# Patient Record
Sex: MALE | Race: WHITE | NOT HISPANIC OR LATINO
[De-identification: names, ages, dates, MRNs, and addresses within clinical notes are randomized per-mention and may not be internally consistent; named-entity substitution may affect disease eponyms.]

---

## 2017-01-03 ENCOUNTER — RX RENEWAL (OUTPATIENT)
Age: 82
End: 2017-01-03

## 2017-01-05 ENCOUNTER — RX RENEWAL (OUTPATIENT)
Age: 82
End: 2017-01-05

## 2017-01-23 ENCOUNTER — APPOINTMENT (OUTPATIENT)
Dept: CARDIOLOGY | Facility: CLINIC | Age: 82
End: 2017-01-23

## 2017-01-23 ENCOUNTER — NON-APPOINTMENT (OUTPATIENT)
Age: 82
End: 2017-01-23

## 2017-01-23 ENCOUNTER — APPOINTMENT (OUTPATIENT)
Dept: PULMONOLOGY | Facility: CLINIC | Age: 82
End: 2017-01-23

## 2017-01-23 VITALS
HEART RATE: 64 BPM | RESPIRATION RATE: 12 BRPM | SYSTOLIC BLOOD PRESSURE: 157 MMHG | OXYGEN SATURATION: 93 % | HEIGHT: 65 IN | WEIGHT: 160 LBS | DIASTOLIC BLOOD PRESSURE: 70 MMHG | BODY MASS INDEX: 26.66 KG/M2

## 2017-01-23 VITALS — SYSTOLIC BLOOD PRESSURE: 130 MMHG | DIASTOLIC BLOOD PRESSURE: 78 MMHG

## 2017-03-07 ENCOUNTER — APPOINTMENT (OUTPATIENT)
Dept: PULMONOLOGY | Facility: CLINIC | Age: 82
End: 2017-03-07

## 2017-03-07 VITALS
DIASTOLIC BLOOD PRESSURE: 80 MMHG | RESPIRATION RATE: 14 BRPM | HEART RATE: 60 BPM | SYSTOLIC BLOOD PRESSURE: 120 MMHG | OXYGEN SATURATION: 96 %

## 2017-04-03 ENCOUNTER — APPOINTMENT (OUTPATIENT)
Dept: PULMONOLOGY | Facility: CLINIC | Age: 82
End: 2017-04-03

## 2017-04-03 VITALS
HEART RATE: 56 BPM | SYSTOLIC BLOOD PRESSURE: 139 MMHG | HEIGHT: 65 IN | TEMPERATURE: 98.3 F | OXYGEN SATURATION: 96 % | WEIGHT: 173 LBS | BODY MASS INDEX: 28.82 KG/M2 | DIASTOLIC BLOOD PRESSURE: 67 MMHG | RESPIRATION RATE: 12 BRPM

## 2017-05-10 ENCOUNTER — RX RENEWAL (OUTPATIENT)
Age: 82
End: 2017-05-10

## 2017-05-30 ENCOUNTER — RX RENEWAL (OUTPATIENT)
Age: 82
End: 2017-05-30

## 2017-06-20 ENCOUNTER — RX RENEWAL (OUTPATIENT)
Age: 82
End: 2017-06-20

## 2017-06-27 ENCOUNTER — RX RENEWAL (OUTPATIENT)
Age: 82
End: 2017-06-27

## 2017-06-27 RX ORDER — IBANDRONATE SODIUM 150 MG/1
150 TABLET ORAL
Qty: 12 | Refills: 0 | Status: ACTIVE | COMMUNITY
Start: 2017-06-27 | End: 1900-01-01

## 2017-10-30 ENCOUNTER — APPOINTMENT (OUTPATIENT)
Dept: PULMONOLOGY | Facility: CLINIC | Age: 82
End: 2017-10-30
Payer: MEDICARE

## 2017-10-30 VITALS
SYSTOLIC BLOOD PRESSURE: 145 MMHG | HEIGHT: 65 IN | WEIGHT: 169 LBS | BODY MASS INDEX: 28.16 KG/M2 | TEMPERATURE: 98 F | DIASTOLIC BLOOD PRESSURE: 70 MMHG | RESPIRATION RATE: 12 BRPM | OXYGEN SATURATION: 95 % | HEART RATE: 58 BPM

## 2017-10-30 DIAGNOSIS — N40.0 BENIGN PROSTATIC HYPERPLASIA WITHOUT LOWER URINARY TRACT SYMPMS: ICD-10-CM

## 2017-10-30 PROCEDURE — 99397 PER PM REEVAL EST PAT 65+ YR: CPT

## 2017-10-30 PROCEDURE — G0439: CPT

## 2017-11-06 ENCOUNTER — MEDICATION RENEWAL (OUTPATIENT)
Age: 82
End: 2017-11-06

## 2017-11-15 ENCOUNTER — APPOINTMENT (OUTPATIENT)
Dept: CARDIOLOGY | Facility: CLINIC | Age: 82
End: 2017-11-15

## 2017-11-30 ENCOUNTER — NON-APPOINTMENT (OUTPATIENT)
Age: 82
End: 2017-11-30

## 2017-11-30 ENCOUNTER — APPOINTMENT (OUTPATIENT)
Dept: CARDIOLOGY | Facility: CLINIC | Age: 82
End: 2017-11-30
Payer: MEDICARE

## 2017-11-30 VITALS
DIASTOLIC BLOOD PRESSURE: 61 MMHG | BODY MASS INDEX: 27.99 KG/M2 | TEMPERATURE: 97.5 F | OXYGEN SATURATION: 94 % | HEART RATE: 62 BPM | WEIGHT: 168 LBS | HEIGHT: 65 IN | SYSTOLIC BLOOD PRESSURE: 145 MMHG | RESPIRATION RATE: 12 BRPM

## 2017-11-30 PROCEDURE — 99215 OFFICE O/P EST HI 40 MIN: CPT | Mod: 25

## 2017-11-30 PROCEDURE — 93000 ELECTROCARDIOGRAM COMPLETE: CPT

## 2017-12-11 ENCOUNTER — RX RENEWAL (OUTPATIENT)
Age: 82
End: 2017-12-11

## 2017-12-13 ENCOUNTER — RX RENEWAL (OUTPATIENT)
Age: 82
End: 2017-12-13

## 2018-01-03 ENCOUNTER — APPOINTMENT (OUTPATIENT)
Dept: CARDIOLOGY | Facility: CLINIC | Age: 83
End: 2018-01-03

## 2018-02-05 ENCOUNTER — APPOINTMENT (OUTPATIENT)
Dept: PULMONOLOGY | Facility: CLINIC | Age: 83
End: 2018-02-05

## 2018-02-13 ENCOUNTER — RX RENEWAL (OUTPATIENT)
Age: 83
End: 2018-02-13

## 2018-03-14 ENCOUNTER — NON-APPOINTMENT (OUTPATIENT)
Age: 83
End: 2018-03-14

## 2018-03-14 ENCOUNTER — APPOINTMENT (OUTPATIENT)
Dept: CARDIOLOGY | Facility: CLINIC | Age: 83
End: 2018-03-14
Payer: MEDICARE

## 2018-03-14 VITALS
BODY MASS INDEX: 28.99 KG/M2 | TEMPERATURE: 97.9 F | SYSTOLIC BLOOD PRESSURE: 154 MMHG | HEIGHT: 65 IN | HEART RATE: 65 BPM | RESPIRATION RATE: 12 BRPM | OXYGEN SATURATION: 95 % | WEIGHT: 174 LBS | DIASTOLIC BLOOD PRESSURE: 66 MMHG

## 2018-03-14 PROCEDURE — 93000 ELECTROCARDIOGRAM COMPLETE: CPT

## 2018-03-14 PROCEDURE — 99215 OFFICE O/P EST HI 40 MIN: CPT | Mod: 25

## 2018-04-16 ENCOUNTER — APPOINTMENT (OUTPATIENT)
Dept: PULMONOLOGY | Facility: CLINIC | Age: 83
End: 2018-04-16
Payer: MEDICARE

## 2018-04-16 ENCOUNTER — NON-APPOINTMENT (OUTPATIENT)
Age: 83
End: 2018-04-16

## 2018-04-16 VITALS
BODY MASS INDEX: 28.49 KG/M2 | DIASTOLIC BLOOD PRESSURE: 68 MMHG | HEIGHT: 65 IN | RESPIRATION RATE: 12 BRPM | OXYGEN SATURATION: 90 % | WEIGHT: 171 LBS | HEART RATE: 73 BPM | SYSTOLIC BLOOD PRESSURE: 138 MMHG

## 2018-04-16 PROCEDURE — 99214 OFFICE O/P EST MOD 30 MIN: CPT | Mod: 25

## 2018-04-16 PROCEDURE — 94060 EVALUATION OF WHEEZING: CPT

## 2018-04-30 ENCOUNTER — RX RENEWAL (OUTPATIENT)
Age: 83
End: 2018-04-30

## 2018-06-04 ENCOUNTER — RX RENEWAL (OUTPATIENT)
Age: 83
End: 2018-06-04

## 2018-06-18 ENCOUNTER — RX RENEWAL (OUTPATIENT)
Age: 83
End: 2018-06-18

## 2018-06-20 ENCOUNTER — APPOINTMENT (OUTPATIENT)
Dept: CARDIOLOGY | Facility: CLINIC | Age: 83
End: 2018-06-20
Payer: MEDICARE

## 2018-06-20 ENCOUNTER — NON-APPOINTMENT (OUTPATIENT)
Age: 83
End: 2018-06-20

## 2018-06-20 VITALS
WEIGHT: 175 LBS | SYSTOLIC BLOOD PRESSURE: 150 MMHG | BODY MASS INDEX: 29.16 KG/M2 | DIASTOLIC BLOOD PRESSURE: 54 MMHG | HEIGHT: 65 IN | HEART RATE: 56 BPM | TEMPERATURE: 97.9 F | RESPIRATION RATE: 12 BRPM | OXYGEN SATURATION: 96 %

## 2018-06-20 PROCEDURE — 99214 OFFICE O/P EST MOD 30 MIN: CPT

## 2018-08-27 ENCOUNTER — RX RENEWAL (OUTPATIENT)
Age: 83
End: 2018-08-27

## 2018-09-12 ENCOUNTER — APPOINTMENT (OUTPATIENT)
Dept: CARDIOLOGY | Facility: CLINIC | Age: 83
End: 2018-09-12
Payer: MEDICARE

## 2018-09-12 ENCOUNTER — NON-APPOINTMENT (OUTPATIENT)
Age: 83
End: 2018-09-12

## 2018-09-12 VITALS
HEIGHT: 65 IN | TEMPERATURE: 97.9 F | HEART RATE: 42 BPM | WEIGHT: 180 LBS | RESPIRATION RATE: 12 BRPM | DIASTOLIC BLOOD PRESSURE: 56 MMHG | OXYGEN SATURATION: 97 % | BODY MASS INDEX: 29.99 KG/M2 | SYSTOLIC BLOOD PRESSURE: 156 MMHG

## 2018-09-12 PROCEDURE — 99214 OFFICE O/P EST MOD 30 MIN: CPT | Mod: 25

## 2018-09-12 PROCEDURE — 93306 TTE W/DOPPLER COMPLETE: CPT

## 2018-09-12 RX ORDER — CIPROFLOXACIN 3 MG/ML
0.3 SOLUTION OPHTHALMIC EVERY 4 HOURS
Qty: 5 | Refills: 0 | Status: DISCONTINUED | COMMUNITY
Start: 2017-01-23 | End: 2018-09-12

## 2018-10-23 ENCOUNTER — RX RENEWAL (OUTPATIENT)
Age: 83
End: 2018-10-23

## 2018-11-15 ENCOUNTER — RX RENEWAL (OUTPATIENT)
Age: 83
End: 2018-11-15

## 2018-11-26 ENCOUNTER — RX RENEWAL (OUTPATIENT)
Age: 83
End: 2018-11-26

## 2018-12-18 ENCOUNTER — RX RENEWAL (OUTPATIENT)
Age: 83
End: 2018-12-18

## 2018-12-19 ENCOUNTER — NON-APPOINTMENT (OUTPATIENT)
Age: 83
End: 2018-12-19

## 2018-12-19 ENCOUNTER — APPOINTMENT (OUTPATIENT)
Dept: CARDIOLOGY | Facility: CLINIC | Age: 83
End: 2018-12-19
Payer: MEDICARE

## 2018-12-19 VITALS
RESPIRATION RATE: 12 BRPM | DIASTOLIC BLOOD PRESSURE: 68 MMHG | TEMPERATURE: 98.2 F | OXYGEN SATURATION: 96 % | SYSTOLIC BLOOD PRESSURE: 124 MMHG | WEIGHT: 179 LBS | HEIGHT: 65 IN | BODY MASS INDEX: 29.82 KG/M2 | HEART RATE: 62 BPM

## 2018-12-19 PROCEDURE — 99214 OFFICE O/P EST MOD 30 MIN: CPT | Mod: 25

## 2018-12-19 NOTE — HISTORY OF PRESENT ILLNESS
[FreeTextEntry1] : Kory is complaining of fatigue. His blood pressure after dialysis is 140s. Denies any chest pain, palpitations, shortness of breath, edema.

## 2018-12-19 NOTE — PHYSICAL EXAM
[General Appearance - Well Developed] : well developed [Normal Appearance] : normal appearance [Well Groomed] : well groomed [General Appearance - Well Nourished] : well nourished [No Deformities] : no deformities [General Appearance - In No Acute Distress] : no acute distress [Normal Conjunctiva] : the conjunctiva exhibited no abnormalities [Eyelids - No Xanthelasma] : the eyelids demonstrated no xanthelasmas [Normal Oral Mucosa] : normal oral mucosa [No Oral Pallor] : no oral pallor [No Oral Cyanosis] : no oral cyanosis [Normal Jugular Venous A Waves Present] : normal jugular venous A waves present [Normal Jugular Venous V Waves Present] : normal jugular venous V waves present [No Jugular Venous Covarrubias A Waves] : no jugular venous covarrubias A waves [Respiration, Rhythm And Depth] : normal respiratory rhythm and effort [Exaggerated Use Of Accessory Muscles For Inspiration] : no accessory muscle use [Heart Sounds] : normal S1 and S2 [Regularly Irregular] : the rhythm was regularly irregular [Systolic grade ___/6] : A grade [unfilled]/6 systolic murmur was heard. [Abdomen Soft] : soft [Abdomen Tenderness] : non-tender [Abdomen Mass (___ Cm)] : no abdominal mass palpated [Abnormal Walk] : normal gait [Gait - Sufficient For Exercise Testing] : the gait was sufficient for exercise testing [Nail Clubbing] : no clubbing of the fingernails [Cyanosis, Localized] : no localized cyanosis [Petechial Hemorrhages (___cm)] : no petechial hemorrhages [Skin Color & Pigmentation] : normal skin color and pigmentation [] : no rash [No Venous Stasis] : no venous stasis [Skin Lesions] : no skin lesions [No Skin Ulcers] : no skin ulcer [No Xanthoma] : no  xanthoma was observed [Oriented To Time, Place, And Person] : oriented to person, place, and time [Affect] : the affect was normal [Mood] : the mood was normal [No Anxiety] : not feeling anxious [FreeTextEntry1] : edentulous

## 2018-12-19 NOTE — DISCUSSION/SUMMARY
[___ Month(s)] : [unfilled] month(s) [FreeTextEntry1] : The patient is an 83-year-old gentleman ex smoker, hypertension, hyperlipidemia, hypothyroidism, PVD,  CAD, chronic systolic heart failure, anemia, ESRD on dialysis who is at baseline. \par #1 CAD- no angina or heart failure, on DAPT, no bleeding.\par #2 Htn- acceptable for his age on amlodipine and toprol, hold amlodipine on dialysis days\par #3 PVC- continue on high dose beta blocker\par #4 Pericardial effusion- improved on echo \par #5 Lipids- on statin\par #6 Hypothyroid- therapeutic\par #7 ESRD- on dialysis, contributing to fatigue

## 2019-02-15 ENCOUNTER — RX RENEWAL (OUTPATIENT)
Age: 84
End: 2019-02-15

## 2019-03-25 ENCOUNTER — APPOINTMENT (OUTPATIENT)
Dept: CARDIOLOGY | Facility: CLINIC | Age: 84
End: 2019-03-25

## 2019-04-05 ENCOUNTER — RX RENEWAL (OUTPATIENT)
Age: 84
End: 2019-04-05

## 2019-04-10 ENCOUNTER — APPOINTMENT (OUTPATIENT)
Dept: CARDIOLOGY | Facility: CLINIC | Age: 84
End: 2019-04-10
Payer: MEDICARE

## 2019-04-10 ENCOUNTER — NON-APPOINTMENT (OUTPATIENT)
Age: 84
End: 2019-04-10

## 2019-04-10 VITALS
HEIGHT: 65 IN | OXYGEN SATURATION: 95 % | BODY MASS INDEX: 28.82 KG/M2 | SYSTOLIC BLOOD PRESSURE: 157 MMHG | RESPIRATION RATE: 12 BRPM | WEIGHT: 173 LBS | HEART RATE: 64 BPM | DIASTOLIC BLOOD PRESSURE: 79 MMHG | TEMPERATURE: 98.1 F

## 2019-04-10 PROCEDURE — 99214 OFFICE O/P EST MOD 30 MIN: CPT | Mod: 25

## 2019-04-10 PROCEDURE — 93000 ELECTROCARDIOGRAM COMPLETE: CPT

## 2019-04-10 NOTE — REVIEW OF SYSTEMS
[Shortness Of Breath] : no shortness of breath [Recent Weight Loss (___ Lbs)] : recent [unfilled] ~Ulb weight loss [Lower Ext Edema] : no extremity edema [Chest Pain] : no chest pain [Dyspnea on exertion] : not dyspnea during exertion [Palpitations] : no palpitations [Negative] : Heme/Lymph

## 2019-04-10 NOTE — DISCUSSION/SUMMARY
[___ Month(s)] : [unfilled] month(s) [FreeTextEntry1] : The patient is an 83-year-old gentleman ex smoker, hypertension, hyperlipidemia, hypothyroidism, PVD,  CAD, chronic systolic heart failure, anemia, ESRD on dialysis who is hypertensive\par #1 CAD- no angina or heart failure, on DAPT, no bleeding.\par #2 Htn- restart amlodipine and continue toprol, hold amlodipine on dialysis days\par #3 PVC- continue on high dose beta blocker\par #4 Pericardial effusion- improved on echo \par #5 Lipids- on atorvastatin 20mg, labs today\par #6 Hypothyroid- therapeutic\par #7 ESRD- on dialysis, contributing to fatigue

## 2019-04-10 NOTE — HISTORY OF PRESENT ILLNESS
[FreeTextEntry1] : Kory ran out of amlodipine and missed his last appointment.  Denies any chest pain, palpitations, shortness of breath, edema.

## 2019-04-10 NOTE — PHYSICAL EXAM
[General Appearance - Well Developed] : well developed [Well Groomed] : well groomed [Normal Appearance] : normal appearance [General Appearance - Well Nourished] : well nourished [No Deformities] : no deformities [General Appearance - In No Acute Distress] : no acute distress [Normal Conjunctiva] : the conjunctiva exhibited no abnormalities [Eyelids - No Xanthelasma] : the eyelids demonstrated no xanthelasmas [No Oral Pallor] : no oral pallor [Normal Oral Mucosa] : normal oral mucosa [No Oral Cyanosis] : no oral cyanosis [FreeTextEntry1] : edentulous [Normal Jugular Venous V Waves Present] : normal jugular venous V waves present [Normal Jugular Venous A Waves Present] : normal jugular venous A waves present [Respiration, Rhythm And Depth] : normal respiratory rhythm and effort [No Jugular Venous Covarrubias A Waves] : no jugular venous covarrubias A waves [Exaggerated Use Of Accessory Muscles For Inspiration] : no accessory muscle use [Heart Sounds] : normal S1 and S2 [Regularly Irregular] : the rhythm was regularly irregular [Systolic grade ___/6] : A grade [unfilled]/6 systolic murmur was heard. [Abdomen Soft] : soft [Abdomen Mass (___ Cm)] : no abdominal mass palpated [Abdomen Tenderness] : non-tender [Abnormal Walk] : normal gait [Gait - Sufficient For Exercise Testing] : the gait was sufficient for exercise testing [Nail Clubbing] : no clubbing of the fingernails [Petechial Hemorrhages (___cm)] : no petechial hemorrhages [Cyanosis, Localized] : no localized cyanosis [] : no ischemic changes [Skin Color & Pigmentation] : normal skin color and pigmentation [No Skin Ulcers] : no skin ulcer [No Venous Stasis] : no venous stasis [Skin Lesions] : no skin lesions [Affect] : the affect was normal [No Xanthoma] : no  xanthoma was observed [Oriented To Time, Place, And Person] : oriented to person, place, and time [Mood] : the mood was normal [No Anxiety] : not feeling anxious

## 2019-04-11 LAB
25(OH)D3 SERPL-MCNC: 30.7 NG/ML
ALBUMIN SERPL ELPH-MCNC: 4.4 G/DL
ALP BLD-CCNC: 99 U/L
ALT SERPL-CCNC: 14 U/L
ANION GAP SERPL CALC-SCNC: 16 MMOL/L
AST SERPL-CCNC: 13 U/L
BASOPHILS # BLD AUTO: 0.06 K/UL
BASOPHILS NFR BLD AUTO: 0.9 %
BILIRUB SERPL-MCNC: 0.2 MG/DL
BUN SERPL-MCNC: 45 MG/DL
CALCIUM SERPL-MCNC: 8.4 MG/DL
CHLORIDE SERPL-SCNC: 96 MMOL/L
CHOLEST SERPL-MCNC: 157 MG/DL
CHOLEST/HDLC SERPL: 2.2 RATIO
CO2 SERPL-SCNC: 32 MMOL/L
CREAT SERPL-MCNC: 6.07 MG/DL
EOSINOPHIL # BLD AUTO: 0.2 K/UL
EOSINOPHIL NFR BLD AUTO: 2.8 %
ESTIMATED AVERAGE GLUCOSE: 131 MG/DL
GLUCOSE SERPL-MCNC: 78 MG/DL
HBA1C MFR BLD HPLC: 6.2 %
HCT VFR BLD CALC: 36.4 %
HDLC SERPL-MCNC: 70 MG/DL
HGB BLD-MCNC: 11.3 G/DL
IMM GRANULOCYTES NFR BLD AUTO: 0.4 %
LDLC SERPL CALC-MCNC: 72 MG/DL
LYMPHOCYTES # BLD AUTO: 1.29 K/UL
LYMPHOCYTES NFR BLD AUTO: 18.3 %
MAN DIFF?: NORMAL
MCHC RBC-ENTMCNC: 31 GM/DL
MCHC RBC-ENTMCNC: 31.1 PG
MCV RBC AUTO: 100.3 FL
MONOCYTES # BLD AUTO: 0.8 K/UL
MONOCYTES NFR BLD AUTO: 11.4 %
NEUTROPHILS # BLD AUTO: 4.65 K/UL
NEUTROPHILS NFR BLD AUTO: 66.2 %
PLATELET # BLD AUTO: 169 K/UL
POTASSIUM SERPL-SCNC: 5.4 MMOL/L
PROT SERPL-MCNC: 6.7 G/DL
RBC # BLD: 3.63 M/UL
RBC # FLD: 14.7 %
SODIUM SERPL-SCNC: 143 MMOL/L
TRIGL SERPL-MCNC: 74 MG/DL
WBC # FLD AUTO: 7.03 K/UL

## 2019-05-07 ENCOUNTER — APPOINTMENT (OUTPATIENT)
Dept: PULMONOLOGY | Facility: CLINIC | Age: 84
End: 2019-05-07
Payer: MEDICARE

## 2019-05-07 VITALS
RESPIRATION RATE: 14 BRPM | HEART RATE: 59 BPM | BODY MASS INDEX: 28.82 KG/M2 | SYSTOLIC BLOOD PRESSURE: 152 MMHG | HEIGHT: 65 IN | OXYGEN SATURATION: 98 % | DIASTOLIC BLOOD PRESSURE: 73 MMHG | TEMPERATURE: 97.9 F | WEIGHT: 173 LBS

## 2019-05-07 DIAGNOSIS — R06.02 SHORTNESS OF BREATH: ICD-10-CM

## 2019-05-07 PROCEDURE — 99214 OFFICE O/P EST MOD 30 MIN: CPT | Mod: 25

## 2019-05-07 PROCEDURE — 94060 EVALUATION OF WHEEZING: CPT

## 2019-05-07 PROCEDURE — ZZZZZ: CPT

## 2019-05-07 NOTE — HISTORY OF PRESENT ILLNESS
[FreeTextEntry1] : Patient is a 83-year-old male with past medical history significant for hypertension, congestive heart failure, chronic obstructive pulmonary disease, atherosclerotic heart disease , End Stage renal disease currently on hemodialysis who presents today for followup . The patient presents complaining of occasional SOB and [place]. Patient presents complaining of cough and sputum production over the last 2 weeks

## 2019-05-07 NOTE — REVIEW OF SYSTEMS
[Cough] : cough [Sputum] : sputum  [Hemoptysis] : no hemoptysis [Pleuritic Pain] : no pleuritic pain [Chest Tightness] : no chest tightness [Dyspnea] : dyspnea [Frequent URIs] : no frequent upper respiratory infections [Wheezing] : wheezing [Negative] : Sleep Disorder

## 2019-05-07 NOTE — ASSESSMENT
[FreeTextEntry1] : In summary the patient is a 83-year-old male with past medical history significant for end-stage renal disease currently on hemodialysis, atherosclerotic heart disease, congestive heart failure, chronic obstructive pulmonary disease, hypertension and hypothyroidism who presents today for followup .The patient's physical exam is significant for scattered rhonchi and expiratory wheezing bilaterally. The patient underwent spirometry which revealed severe obstructive lung disease. The patient is  started on Trelegy. Prescription renewal performed. Patient instructed to continue current medications and followup in 3 months

## 2019-05-07 NOTE — REASON FOR VISIT
[Follow-Up] : a follow-up visit [COPD] : COPD [Shortness of Breath] : shortness of Breath [Cough] : cough [Wheezing] : wheezing

## 2019-05-07 NOTE — PHYSICAL EXAM
[Normal Appearance] : normal appearance [General Appearance - Well Developed] : well developed [General Appearance - Well Nourished] : well nourished [Well Groomed] : well groomed [No Deformities] : no deformities [General Appearance - In No Acute Distress] : no acute distress [Eyelids - No Xanthelasma] : the eyelids demonstrated no xanthelasmas [Normal Conjunctiva] : the conjunctiva exhibited no abnormalities [Normal Oropharynx] : normal oropharynx [II] : II [Jugular Venous Distention Increased] : there was no jugular-venous distention [Neck Appearance] : the appearance of the neck was normal [Heart Rate And Rhythm] : heart rate and rhythm were normal [Heart Sounds] : normal S1 and S2 [Edema] : no peripheral edema present [Exaggerated Use Of Accessory Muscles For Inspiration] : no accessory muscle use [Scattered Wheezes] : scattered wheezing [Prolonged Exp Time] : with prolonged expiratory time [Increased E/I Ratio] : with increased expiratory/inspiratory ratio [Bibasilar Rales/Crackles] : bibasilar rales [Decreased Breath Sounds] : decreased breath sounds [Bowel Sounds] : normal bowel sounds [Abdomen Soft] : soft [Nail Clubbing] : no clubbing of the fingernails [Abnormal Walk] : normal gait [Cyanosis, Localized] : no localized cyanosis [No Focal Deficits] : no focal deficits [] : no rash [Oriented To Time, Place, And Person] : oriented to person, place, and time [Affect] : the affect was normal [Impaired Insight] : insight and judgment were intact [Mood] : the mood was normal

## 2019-07-05 ENCOUNTER — RX RENEWAL (OUTPATIENT)
Age: 84
End: 2019-07-05

## 2019-08-02 ENCOUNTER — RX RENEWAL (OUTPATIENT)
Age: 84
End: 2019-08-02

## 2019-08-07 ENCOUNTER — APPOINTMENT (OUTPATIENT)
Dept: CARDIOLOGY | Facility: CLINIC | Age: 84
End: 2019-08-07

## 2019-08-07 ENCOUNTER — APPOINTMENT (OUTPATIENT)
Dept: INTERNAL MEDICINE | Facility: CLINIC | Age: 84
End: 2019-08-07

## 2019-08-13 ENCOUNTER — APPOINTMENT (OUTPATIENT)
Dept: PULMONOLOGY | Facility: CLINIC | Age: 84
End: 2019-08-13

## 2019-10-28 ENCOUNTER — RX RENEWAL (OUTPATIENT)
Age: 84
End: 2019-10-28

## 2019-11-26 ENCOUNTER — RX RENEWAL (OUTPATIENT)
Age: 84
End: 2019-11-26

## 2020-03-18 ENCOUNTER — RX RENEWAL (OUTPATIENT)
Age: 85
End: 2020-03-18

## 2020-04-15 ENCOUNTER — RX RENEWAL (OUTPATIENT)
Age: 85
End: 2020-04-15

## 2020-04-17 ENCOUNTER — RX RENEWAL (OUTPATIENT)
Age: 85
End: 2020-04-17

## 2020-04-20 ENCOUNTER — APPOINTMENT (OUTPATIENT)
Dept: CARDIOLOGY | Facility: CLINIC | Age: 85
End: 2020-04-20
Payer: MEDICARE

## 2020-04-20 ENCOUNTER — TRANSCRIPTION ENCOUNTER (OUTPATIENT)
Age: 85
End: 2020-04-20

## 2020-04-20 PROCEDURE — 99212 OFFICE O/P EST SF 10 MIN: CPT | Mod: 95

## 2020-04-20 NOTE — HISTORY OF PRESENT ILLNESS
[Home] : at home, [unfilled] , at the time of the visit. [Medical Office: ([facility])___] : at the medical office located in  [Patient] : the patient [Family Member] : family member [Self] : self [FreeTextEntry1] : Kory has not been here in over a year. Dialysis has been going well and holds the amlodipine prior to treatment. He continues to walk without chest pain. Limitations secondary to knee pain. Denies any palpitations, lightheadedness or dizziness.

## 2020-04-20 NOTE — DISCUSSION/SUMMARY
[___ Month(s)] : [unfilled] month(s) [FreeTextEntry1] : The patient is an 84-year-old gentleman ex smoker, hypertension, hyperlipidemia, hypothyroidism, PVD,  CAD, chronic systolic heart failure, anemia, ESRD on dialysis who is asymptomatic.\par #1 CAD- no angina or heart failure, on DAPT, no bleeding.\par #2 Htn-  amlodipine and  toprol, hold amlodipine on dialysis days\par #3 PVC- continue on high dose beta blocker\par #4 Pericardial effusion- echo next visit\par #5 Lipids- on atorvastatin 20mg\par #6 Hypothyroid- therapeutic\par #7 ESRD- on dialysis, contributing to fatigue \par \par Time started: 3:20PM\par Time ended: 3:30PM

## 2020-06-09 ENCOUNTER — RX RENEWAL (OUTPATIENT)
Age: 85
End: 2020-06-09

## 2020-07-15 ENCOUNTER — RX RENEWAL (OUTPATIENT)
Age: 85
End: 2020-07-15

## 2020-09-01 ENCOUNTER — RX RENEWAL (OUTPATIENT)
Age: 85
End: 2020-09-01

## 2020-09-09 ENCOUNTER — APPOINTMENT (OUTPATIENT)
Dept: PULMONOLOGY | Facility: CLINIC | Age: 85
End: 2020-09-09
Payer: MEDICARE

## 2020-09-09 ENCOUNTER — LABORATORY RESULT (OUTPATIENT)
Age: 85
End: 2020-09-09

## 2020-09-09 ENCOUNTER — APPOINTMENT (OUTPATIENT)
Dept: CARDIOLOGY | Facility: CLINIC | Age: 85
End: 2020-09-09
Payer: MEDICARE

## 2020-09-09 VITALS
OXYGEN SATURATION: 97 % | DIASTOLIC BLOOD PRESSURE: 66 MMHG | SYSTOLIC BLOOD PRESSURE: 168 MMHG | RESPIRATION RATE: 14 BRPM | TEMPERATURE: 97.1 F | HEART RATE: 61 BPM | BODY MASS INDEX: 29.95 KG/M2 | WEIGHT: 180 LBS

## 2020-09-09 VITALS — DIASTOLIC BLOOD PRESSURE: 68 MMHG | SYSTOLIC BLOOD PRESSURE: 146 MMHG

## 2020-09-09 VITALS — DIASTOLIC BLOOD PRESSURE: 70 MMHG | SYSTOLIC BLOOD PRESSURE: 140 MMHG

## 2020-09-09 DIAGNOSIS — Z86.79 PERSONAL HISTORY OF OTHER DISEASES OF THE CIRCULATORY SYSTEM: ICD-10-CM

## 2020-09-09 PROCEDURE — 99214 OFFICE O/P EST MOD 30 MIN: CPT

## 2020-09-09 NOTE — REASON FOR VISIT
[Follow-Up] : a follow-up visit [Cough] : cough [COPD] : COPD [Edema] : edema [Shortness of Breath] : shortness of breath [Wheezing] : wheezing

## 2020-09-09 NOTE — HISTORY OF PRESENT ILLNESS
[FreeTextEntry1] : Kory walked in the office today to be seen. Refusing ECG. Denies any chest pain, palpitations or shortness of breath. He is hypotensive after dialysis on Tues, Thurs and Sat.

## 2020-09-09 NOTE — PHYSICAL EXAM
[General Appearance - Well Developed] : well developed [Normal Appearance] : normal appearance [Well Groomed] : well groomed [General Appearance - Well Nourished] : well nourished [General Appearance - In No Acute Distress] : no acute distress [Normal Conjunctiva] : the conjunctiva exhibited no abnormalities [No Deformities] : no deformities [No Oral Pallor] : no oral pallor [Eyelids - No Xanthelasma] : the eyelids demonstrated no xanthelasmas [Normal Oral Mucosa] : normal oral mucosa [FreeTextEntry1] : edentulous [No Oral Cyanosis] : no oral cyanosis [Normal Jugular Venous A Waves Present] : normal jugular venous A waves present [Normal Jugular Venous V Waves Present] : normal jugular venous V waves present [No Jugular Venous Covarrubias A Waves] : no jugular venous covarrubias A waves [Heart Sounds] : normal S1 and S2 [Exaggerated Use Of Accessory Muscles For Inspiration] : no accessory muscle use [Respiration, Rhythm And Depth] : normal respiratory rhythm and effort [Regularly Irregular] : the rhythm was regularly irregular [Systolic grade ___/6] : A grade [unfilled]/6 systolic murmur was heard. [Abdomen Soft] : soft [Abdomen Mass (___ Cm)] : no abdominal mass palpated [Abdomen Tenderness] : non-tender [Abnormal Walk] : normal gait [Cyanosis, Localized] : no localized cyanosis [Nail Clubbing] : no clubbing of the fingernails [Gait - Sufficient For Exercise Testing] : the gait was sufficient for exercise testing [Petechial Hemorrhages (___cm)] : no petechial hemorrhages [Skin Lesions] : no skin lesions [] : no rash [Skin Color & Pigmentation] : normal skin color and pigmentation [No Venous Stasis] : no venous stasis [No Skin Ulcers] : no skin ulcer [No Xanthoma] : no  xanthoma was observed [Oriented To Time, Place, And Person] : oriented to person, place, and time [Affect] : the affect was normal [Mood] : the mood was normal [No Anxiety] : not feeling anxious

## 2020-09-09 NOTE — ASSESSMENT
[FreeTextEntry1] : In summary the patient is an 84-year-old male with past medical history significant for end-stage renal disease on hemodialysis, peripheral vascular disease, hypertension, high cholesterol, COPD who presents today for follow-up.  The patient's physical exam is unchanged.  Case discussed with cardiology.  Repeat laboratory work ordered.  Patient instructed to continue current medications and follow-up in 3 months

## 2020-09-09 NOTE — REVIEW OF SYSTEMS
[Dyspnea on exertion] : not dyspnea during exertion [Shortness Of Breath] : no shortness of breath [Chest Pain] : no chest pain [Palpitations] : no palpitations [Lower Ext Edema] : no extremity edema [Negative] : Heme/Lymph

## 2020-09-09 NOTE — PHYSICAL EXAM
[Normal Oropharynx] : normal oropharynx [No Acute Distress] : no acute distress [Normal Appearance] : normal appearance [Normal S1, S2] : normal s1, s2 [No Neck Mass] : no neck mass [Normal Rate/Rhythm] : normal rate/rhythm [No Murmurs] : no murmurs [No Resp Distress] : no resp distress [Clear to Auscultation Bilaterally] : clear to auscultation bilaterally [No Abnormalities] : no abnormalities [Benign] : benign [No Cyanosis] : no cyanosis [Normal Gait] : normal gait [No Clubbing] : no clubbing [No Edema] : no edema [FROM] : FROM [Normal Color/ Pigmentation] : normal color/ pigmentation [No Focal Deficits] : no focal deficits [Oriented x3] : oriented x3 [Normal Affect] : normal affect

## 2020-09-09 NOTE — HISTORY OF PRESENT ILLNESS
[Former] : former [Never] : never [TextBox_4] : Patient is an 84-year-old male with past medical history significant for COPD, end-stage renal disease on hemodialysis, hypertension, high cholesterol, hypothyroidism, coronary artery disease who presents for follow-up.  The patient complains of occasional cough shortness of breath dyspnea on exertion.  He denies fevers chills chest pain weight loss or hemoptysis

## 2020-09-09 NOTE — DISCUSSION/SUMMARY
[FreeTextEntry1] : The patient is an 84-year-old gentleman ex smoker, hypertension, hyperlipidemia, hypothyroidism, PVD,  CAD, chronic systolic heart failure, anemia, ESRD on dialysis who is asymptomatic.\par #1 CAD- no angina or heart failure, on DAPT, no bleeding.\par #2 Htn-  amlodipine and  toprol, hold amlodipine on dialysis days\par #3 PVC- continue on high dose beta blocker\par #4 Pericardial effusion- echo next visit\par #5 Lipids- on atorvastatin 20mg\par #6 Hypothyroid- therapeutic\par #7 ESRD- on dialysis Tues, Thurs, and Sat\par \par Time started: 3:20PM\par Time ended: 3:30PM

## 2020-09-10 LAB
25(OH)D3 SERPL-MCNC: 59.1 NG/ML
ALBUMIN SERPL ELPH-MCNC: 4.2 G/DL
ALP BLD-CCNC: 71 U/L
ALT SERPL-CCNC: 13 U/L
ANION GAP SERPL CALC-SCNC: 16 MMOL/L
AST SERPL-CCNC: 12 U/L
BASOPHILS # BLD AUTO: 0.05 K/UL
BASOPHILS NFR BLD AUTO: 0.9 %
BILIRUB SERPL-MCNC: 0.3 MG/DL
BUN SERPL-MCNC: 38 MG/DL
CALCIUM SERPL-MCNC: 8.8 MG/DL
CHLORIDE SERPL-SCNC: 100 MMOL/L
CHOLEST SERPL-MCNC: 133 MG/DL
CHOLEST/HDLC SERPL: 2.4 RATIO
CO2 SERPL-SCNC: 28 MMOL/L
CREAT SERPL-MCNC: 6.85 MG/DL
EOSINOPHIL # BLD AUTO: 0.29 K/UL
EOSINOPHIL NFR BLD AUTO: 5.2 %
ESTIMATED AVERAGE GLUCOSE: 105 MG/DL
GLUCOSE SERPL-MCNC: 97 MG/DL
HBA1C MFR BLD HPLC: 5.3 %
HCT VFR BLD CALC: 30.6 %
HDLC SERPL-MCNC: 56 MG/DL
HGB BLD-MCNC: 9.6 G/DL
IMM GRANULOCYTES NFR BLD AUTO: 0.5 %
LDLC SERPL CALC-MCNC: 56 MG/DL
LYMPHOCYTES # BLD AUTO: 0.9 K/UL
LYMPHOCYTES NFR BLD AUTO: 16 %
MAN DIFF?: NORMAL
MCHC RBC-ENTMCNC: 31.4 GM/DL
MCHC RBC-ENTMCNC: 32.8 PG
MCV RBC AUTO: 104.4 FL
MONOCYTES # BLD AUTO: 0.69 K/UL
MONOCYTES NFR BLD AUTO: 12.3 %
NEUTROPHILS # BLD AUTO: 3.66 K/UL
NEUTROPHILS NFR BLD AUTO: 65.1 %
PLATELET # BLD AUTO: 206 K/UL
POTASSIUM SERPL-SCNC: 5 MMOL/L
PROT SERPL-MCNC: 5.8 G/DL
RBC # BLD: 2.93 M/UL
RBC # FLD: 15.1 %
SODIUM SERPL-SCNC: 145 MMOL/L
TRIGL SERPL-MCNC: 104 MG/DL
TSH SERPL-ACNC: 13.4 UIU/ML
VIT B12 SERPL-MCNC: 990 PG/ML
WBC # FLD AUTO: 5.62 K/UL

## 2020-09-11 LAB
SARS-COV-2 IGG SERPL IA-ACNC: <0.1 INDEX
SARS-COV-2 IGG SERPL QL IA: NEGATIVE

## 2020-10-01 ENCOUNTER — RX RENEWAL (OUTPATIENT)
Age: 85
End: 2020-10-01

## 2020-11-30 ENCOUNTER — RX RENEWAL (OUTPATIENT)
Age: 85
End: 2020-11-30

## 2021-02-26 ENCOUNTER — RX RENEWAL (OUTPATIENT)
Age: 86
End: 2021-02-26

## 2021-03-10 ENCOUNTER — APPOINTMENT (OUTPATIENT)
Dept: CARDIOLOGY | Facility: CLINIC | Age: 86
End: 2021-03-10
Payer: MEDICARE

## 2021-03-10 ENCOUNTER — NON-APPOINTMENT (OUTPATIENT)
Age: 86
End: 2021-03-10

## 2021-03-10 ENCOUNTER — APPOINTMENT (OUTPATIENT)
Dept: PULMONOLOGY | Facility: CLINIC | Age: 86
End: 2021-03-10
Payer: MEDICARE

## 2021-03-10 VITALS
OXYGEN SATURATION: 95 % | SYSTOLIC BLOOD PRESSURE: 176 MMHG | WEIGHT: 182.98 LBS | HEART RATE: 77 BPM | DIASTOLIC BLOOD PRESSURE: 65 MMHG | BODY MASS INDEX: 30.49 KG/M2 | TEMPERATURE: 97.7 F | HEIGHT: 65 IN | RESPIRATION RATE: 12 BRPM

## 2021-03-10 VITALS — DIASTOLIC BLOOD PRESSURE: 60 MMHG | SYSTOLIC BLOOD PRESSURE: 130 MMHG

## 2021-03-10 VITALS — SYSTOLIC BLOOD PRESSURE: 130 MMHG | DIASTOLIC BLOOD PRESSURE: 64 MMHG

## 2021-03-10 DIAGNOSIS — R11.0 NAUSEA: ICD-10-CM

## 2021-03-10 PROCEDURE — 93000 ELECTROCARDIOGRAM COMPLETE: CPT

## 2021-03-10 PROCEDURE — 99214 OFFICE O/P EST MOD 30 MIN: CPT

## 2021-03-10 RX ORDER — HYDROCORTISONE 25 MG/G
2.5 CREAM TOPICAL
Qty: 1 | Refills: 6 | Status: ACTIVE | COMMUNITY
Start: 2021-03-10 | End: 1900-01-01

## 2021-03-10 NOTE — DISCUSSION/SUMMARY
[FreeTextEntry1] : The patient is an 85-year-old gentleman ex smoker, hypertension, hyperlipidemia, hypothyroidism, PVD,  CAD, chronic systolic heart failure, anemia, ESRD on dialysis with hypotension after an hour of dialysis\par #1 CAD- no angina or heart failure, continue DAPT, no bleeding.\par #2 Htn/hypotension- remain off amlodipine, start midodrine 5mg on dialysis days and reevaluate\par #3 PVC- continue toprol 100mg for PVC\par #4 Pericardial effusion- echo next visit\par #5 Lipids- continue atorvastatin 20mg\par #6 Hypothyroid- therapeutic\par #7 ESRD- on dialysis Tues, Thurs, and Sat, needs COVID vaccine\par \par Time started: 3:20PM\par Time ended: 3:30PM

## 2021-03-10 NOTE — HISTORY OF PRESENT ILLNESS
[FreeTextEntry1] : Kory is here today with family. Denies any chest pain, palpitations or shortness of breath. He is hypotensive after dialysis on Tues, Thurs and Sat. Stopped amlodipine altogether. Takes toprol at night

## 2021-03-25 ENCOUNTER — RX RENEWAL (OUTPATIENT)
Age: 86
End: 2021-03-25

## 2021-03-25 ENCOUNTER — APPOINTMENT (OUTPATIENT)
Dept: PULMONOLOGY | Facility: CLINIC | Age: 86
End: 2021-03-25
Payer: MEDICARE

## 2021-03-25 VITALS
HEIGHT: 65 IN | DIASTOLIC BLOOD PRESSURE: 80 MMHG | SYSTOLIC BLOOD PRESSURE: 138 MMHG | WEIGHT: 175 LBS | OXYGEN SATURATION: 95 % | RESPIRATION RATE: 14 BRPM | TEMPERATURE: 98 F | BODY MASS INDEX: 29.16 KG/M2 | HEART RATE: 76 BPM

## 2021-03-25 PROCEDURE — 99214 OFFICE O/P EST MOD 30 MIN: CPT

## 2021-05-10 ENCOUNTER — APPOINTMENT (OUTPATIENT)
Dept: CARDIOLOGY | Facility: CLINIC | Age: 86
End: 2021-05-10
Payer: MEDICARE

## 2021-05-10 VITALS
RESPIRATION RATE: 12 BRPM | SYSTOLIC BLOOD PRESSURE: 164 MMHG | HEIGHT: 65 IN | TEMPERATURE: 96.9 F | HEART RATE: 58 BPM | OXYGEN SATURATION: 97 % | DIASTOLIC BLOOD PRESSURE: 77 MMHG | WEIGHT: 179 LBS | BODY MASS INDEX: 29.82 KG/M2

## 2021-05-10 VITALS — DIASTOLIC BLOOD PRESSURE: 77 MMHG | SYSTOLIC BLOOD PRESSURE: 130 MMHG

## 2021-05-10 PROCEDURE — 99214 OFFICE O/P EST MOD 30 MIN: CPT

## 2021-05-10 RX ORDER — DICLOFENAC EPOLAMINE 0.01 G/1
1.3 SYSTEM TOPICAL
Qty: 30 | Refills: 0 | Status: DISCONTINUED | COMMUNITY
Start: 2021-04-23

## 2021-05-10 RX ORDER — LEVOTHYROXINE SODIUM 150 UG/1
150 TABLET ORAL
Qty: 30 | Refills: 0 | Status: DISCONTINUED | COMMUNITY
Start: 2021-04-23

## 2021-05-10 RX ORDER — OMEPRAZOLE 20 MG/1
20 CAPSULE, DELAYED RELEASE ORAL
Qty: 30 | Refills: 0 | Status: DISCONTINUED | COMMUNITY
Start: 2021-04-23

## 2021-05-10 RX ORDER — SEVELAMER CARBONATE 800 MG/1
800 TABLET, FILM COATED ORAL
Qty: 180 | Refills: 0 | Status: DISCONTINUED | COMMUNITY
Start: 2021-04-23

## 2021-05-10 RX ORDER — CALCIUM ACETATE 667 MG/1
667 CAPSULE ORAL
Qty: 90 | Refills: 0 | Status: DISCONTINUED | COMMUNITY
Start: 2021-05-06

## 2021-05-10 NOTE — HISTORY OF PRESENT ILLNESS
[FreeTextEntry1] : Kory has not been getting full dialysis because BP in the 80s. Stopped amlodipine and no improvement with midodrine 5mg on dialysis days.

## 2021-05-10 NOTE — DISCUSSION/SUMMARY
[___ Month(s)] : in [unfilled] month(s) [FreeTextEntry1] : The patient is an 85-year-old gentleman ex smoker, hypertension, hyperlipidemia, hypothyroidism, PVD,  CAD, chronic systolic heart failure, anemia, ESRD on dialysis with hypotension after an hour of dialysis.\par #1 CAD- no angina or heart failure, continue DAPT, no bleeding.\par #2 Htn/hypotension- remain off amlodipine, change to midodrine 5mg on nondialysis days and 10mg on dialysis days and reevaluate\par #3 PVC- decrease to toprol 50mg for PVC\par #4 Pericardial effusion- echo should be done\par #5 Lipids- continue atorvastatin 20mg\par #6 Hypothyroid- therapeutic\par #7 ESRD- on dialysis Tues, Thurs, and Sat\par \par

## 2021-05-19 ENCOUNTER — RX RENEWAL (OUTPATIENT)
Age: 86
End: 2021-05-19

## 2021-06-14 ENCOUNTER — APPOINTMENT (OUTPATIENT)
Dept: CARDIOLOGY | Facility: CLINIC | Age: 86
End: 2021-06-14
Payer: MEDICARE

## 2021-06-14 ENCOUNTER — NON-APPOINTMENT (OUTPATIENT)
Age: 86
End: 2021-06-14

## 2021-06-14 VITALS
BODY MASS INDEX: 29.99 KG/M2 | SYSTOLIC BLOOD PRESSURE: 159 MMHG | HEIGHT: 65 IN | OXYGEN SATURATION: 97 % | HEART RATE: 62 BPM | WEIGHT: 180 LBS | TEMPERATURE: 97.7 F | DIASTOLIC BLOOD PRESSURE: 74 MMHG

## 2021-06-14 PROCEDURE — 93000 ELECTROCARDIOGRAM COMPLETE: CPT

## 2021-06-14 PROCEDURE — 99214 OFFICE O/P EST MOD 30 MIN: CPT

## 2021-06-14 NOTE — HISTORY OF PRESENT ILLNESS
[FreeTextEntry1] : Kory still gets low BP at end of dialysis despite increase in midodrine but only taking it in AM.

## 2021-06-14 NOTE — DISCUSSION/SUMMARY
[___ Month(s)] : in [unfilled] month(s) [FreeTextEntry1] : The patient is an 85-year-old gentleman ex smoker, hypertension, hyperlipidemia, hypothyroidism, PVD,  CAD, chronic systolic heart failure, anemia, ESRD on dialysis with hypotension after dialysis.\par #1 CAD- no angina or heart failure, continue DAPT, no bleeding.\par #2 Htn/hypotension- remain off amlodipine, change to midodrine 5mg on nondialysis days in AM and 10mg PM and 10mg on dialysis days and reevaluate\par #3 PVC- continue lower dose toprol 50mg for PVC\par #4 Pericardial effusion- echo rescheduled for this week.\par #5 Lipids- continue atorvastatin 20mg\par #6 Hypothyroid- therapeutic\par #7 ESRD- on dialysis Tues, Thurs, and Sat, discussed with Dr. Centeno from dialysis center and patients daughter\par \par

## 2021-06-18 ENCOUNTER — RX RENEWAL (OUTPATIENT)
Age: 86
End: 2021-06-18

## 2021-06-18 ENCOUNTER — APPOINTMENT (OUTPATIENT)
Dept: CARDIOLOGY | Facility: CLINIC | Age: 86
End: 2021-06-18
Payer: MEDICARE

## 2021-06-18 PROCEDURE — 93306 TTE W/DOPPLER COMPLETE: CPT

## 2021-07-12 ENCOUNTER — APPOINTMENT (OUTPATIENT)
Dept: CARDIOLOGY | Facility: CLINIC | Age: 86
End: 2021-07-12

## 2021-07-16 ENCOUNTER — RX RENEWAL (OUTPATIENT)
Age: 86
End: 2021-07-16

## 2021-07-19 ENCOUNTER — APPOINTMENT (OUTPATIENT)
Dept: CARDIOLOGY | Facility: CLINIC | Age: 86
End: 2021-07-19
Payer: MEDICARE

## 2021-07-19 VITALS
DIASTOLIC BLOOD PRESSURE: 64 MMHG | HEART RATE: 64 BPM | RESPIRATION RATE: 12 BRPM | TEMPERATURE: 96.3 F | SYSTOLIC BLOOD PRESSURE: 178 MMHG | OXYGEN SATURATION: 97 %

## 2021-07-19 PROCEDURE — 99214 OFFICE O/P EST MOD 30 MIN: CPT

## 2021-07-19 RX ORDER — MIDODRINE HYDROCHLORIDE 10 MG/1
10 TABLET ORAL
Qty: 180 | Refills: 1 | Status: ACTIVE | COMMUNITY
Start: 2021-03-10 | End: 1900-01-01

## 2021-07-19 RX ORDER — STANDARDIZED SENNA CONCENTRATE AND DOCUSATE SODIUM 8.6; 5 MG/1; MG/1
8.6-5 TABLET ORAL DAILY
Qty: 60 | Refills: 0 | Status: ACTIVE | COMMUNITY
Start: 2021-07-19 | End: 1900-01-01

## 2021-07-19 NOTE — HISTORY OF PRESENT ILLNESS
[FreeTextEntry1] : Kory is upset because still has hypotension after dialysis and does not think the midodrine is helping. He took no medication today.

## 2021-07-19 NOTE — DISCUSSION/SUMMARY
[FreeTextEntry1] : The patient is an 85-year-old gentleman ex smoker, hypertension, hyperlipidemia, hypothyroidism, PVD,  CAD, chronic systolic heart failure, anemia, ESRD on dialysis with hypotension after dialysis.\par #1 CAD- no angina or heart failure, continue DAPT, no bleeding.\par #2 Htn/hypotension- remain off amlodipine, change to midodrine 20mg on dialysis days and reevaluate\par #3 PVC- decrease toprol 25mg for PVC\par #4 Pericardial effusion- effusion unchanged\par #5 Lipids- continue atorvastatin 20mg\par #6 Hypothyroid- therapeutic\par #7 ESRD- on dialysis Tues, Thurs, and Sat, discussed with Dr. Centeno from dialysis center and patient's daughter\par \par

## 2021-09-10 ENCOUNTER — RX RENEWAL (OUTPATIENT)
Age: 86
End: 2021-09-10

## 2021-09-13 ENCOUNTER — APPOINTMENT (OUTPATIENT)
Dept: CARDIOLOGY | Facility: CLINIC | Age: 86
End: 2021-09-13
Payer: MEDICARE

## 2021-09-13 ENCOUNTER — NON-APPOINTMENT (OUTPATIENT)
Age: 86
End: 2021-09-13

## 2021-09-13 VITALS
RESPIRATION RATE: 12 BRPM | DIASTOLIC BLOOD PRESSURE: 70 MMHG | TEMPERATURE: 97.8 F | OXYGEN SATURATION: 97 % | HEIGHT: 65 IN | SYSTOLIC BLOOD PRESSURE: 151 MMHG | HEART RATE: 79 BPM

## 2021-09-13 PROCEDURE — 99214 OFFICE O/P EST MOD 30 MIN: CPT

## 2021-09-13 NOTE — HISTORY OF PRESENT ILLNESS
[FreeTextEntry1] : Kory stopped the toprol because of low BP and does not think midodrine does anything. Frustrated.

## 2021-09-13 NOTE — DISCUSSION/SUMMARY
[___ Month(s)] : in [unfilled] month(s) [FreeTextEntry1] : The patient is an 85-year-old gentleman ex smoker, hypertension, hyperlipidemia, hypothyroidism, PVD,  CAD, chronic systolic heart failure, anemia, ESRD on dialysis with hypotension after dialysis.\par #1 CAD- no angina or heart failure, continue DAPT, no bleeding.\par #2 Htn/hypotension- remain off amlodipine, midodrine 20mg and fluorinef 0.1mg on dialysis days and reevaluate\par #3 PVC- resume toprol 25mg for PVC\par #4 Pericardial effusion- effusion unchanged, repeat echo next visit- limited for effusion\par #5 Lipids- continue atorvastatin 20mg\par #6 Hypothyroid- therapeutic\par #7 ESRD- on dialysis Tues, Thurs, and Sat, discussed with Dr. Centeno from dialysis center and patient's daughter\par \par

## 2021-10-18 ENCOUNTER — APPOINTMENT (OUTPATIENT)
Dept: PULMONOLOGY | Facility: CLINIC | Age: 86
End: 2021-10-18
Payer: MEDICARE

## 2021-10-18 ENCOUNTER — APPOINTMENT (OUTPATIENT)
Dept: CARDIOLOGY | Facility: CLINIC | Age: 86
End: 2021-10-18

## 2021-10-18 VITALS
BODY MASS INDEX: 29.99 KG/M2 | SYSTOLIC BLOOD PRESSURE: 151 MMHG | HEIGHT: 65 IN | OXYGEN SATURATION: 96 % | RESPIRATION RATE: 12 BRPM | WEIGHT: 180 LBS | DIASTOLIC BLOOD PRESSURE: 69 MMHG | TEMPERATURE: 97.5 F | HEART RATE: 67 BPM

## 2021-10-18 PROCEDURE — 99214 OFFICE O/P EST MOD 30 MIN: CPT

## 2021-10-18 NOTE — ASSESSMENT
[FreeTextEntry1] : In summary the patient is an 85-year-old male with past medical history significant for end-stage renal disease on hemodialysis, atherosclerotic heart disease, congestive heart failure, hypertension COPD hypothyroidism who presents with signs and symptoms of acute bronchitis.  The patient is now started on Augmentin and instructed to follow-up in 2 weeks

## 2021-10-18 NOTE — REVIEW OF SYSTEMS
[Cough] : cough [Hemoptysis] : no hemoptysis [Sputum] : sputum [Dyspnea] : dyspnea [A.M. Dry Mouth] : a.m. dry mouth [SOB on Exertion] : sob on exertion [Negative] : Endocrine

## 2021-10-18 NOTE — HISTORY OF PRESENT ILLNESS
[Former] : former [Never] : never [TextBox_4] : Patient is an 85-year-old male with past medical history significant for end-stage renal disease currently on hemodialysis, COPD, congestive heart failure, coronary artery disease, hypertension high cholesterol hypothyroidism who presents complaining of cough sputum production and occasional shortness of breath.  He denies fevers chills chest pain weight loss or hemoptysis

## 2021-11-01 ENCOUNTER — APPOINTMENT (OUTPATIENT)
Dept: CARDIOLOGY | Facility: CLINIC | Age: 86
End: 2021-11-01

## 2021-11-05 ENCOUNTER — RX RENEWAL (OUTPATIENT)
Age: 86
End: 2021-11-05

## 2021-11-12 ENCOUNTER — RX RENEWAL (OUTPATIENT)
Age: 86
End: 2021-11-12

## 2021-11-15 ENCOUNTER — APPOINTMENT (OUTPATIENT)
Dept: CARDIOLOGY | Facility: CLINIC | Age: 86
End: 2021-11-15
Payer: MEDICARE

## 2021-11-15 ENCOUNTER — NON-APPOINTMENT (OUTPATIENT)
Age: 86
End: 2021-11-15

## 2021-11-15 VITALS
SYSTOLIC BLOOD PRESSURE: 170 MMHG | OXYGEN SATURATION: 97 % | HEIGHT: 65 IN | TEMPERATURE: 97.5 F | DIASTOLIC BLOOD PRESSURE: 66 MMHG | HEART RATE: 69 BPM | RESPIRATION RATE: 12 BRPM

## 2021-11-15 VITALS — DIASTOLIC BLOOD PRESSURE: 64 MMHG | SYSTOLIC BLOOD PRESSURE: 150 MMHG

## 2021-11-15 PROCEDURE — 93308 TTE F-UP OR LMTD: CPT

## 2021-11-15 PROCEDURE — 99214 OFFICE O/P EST MOD 30 MIN: CPT

## 2021-11-15 PROCEDURE — 93000 ELECTROCARDIOGRAM COMPLETE: CPT

## 2021-11-15 RX ORDER — PROMETHAZINE HYDROCHLORIDE 6.25 MG/5ML
6.25 SOLUTION ORAL
Qty: 150 | Refills: 1 | Status: DISCONTINUED | COMMUNITY
Start: 2021-03-25 | End: 2021-11-15

## 2021-11-15 RX ORDER — ALENDRONATE SODIUM 70 MG/1
70 TABLET ORAL
Qty: 4 | Refills: 1 | Status: DISCONTINUED | COMMUNITY
Start: 2017-12-13 | End: 2021-11-15

## 2021-11-15 RX ORDER — MIDODRINE HYDROCHLORIDE 5 MG/1
5 TABLET ORAL
Qty: 60 | Refills: 0 | Status: DISCONTINUED | COMMUNITY
Start: 2021-06-18 | End: 2021-11-15

## 2021-11-15 RX ORDER — FLUTICASONE PROPIONATE 50 UG/1
50 SPRAY, METERED NASAL DAILY
Qty: 3 | Refills: 3 | Status: DISCONTINUED | COMMUNITY
Start: 2017-10-30 | End: 2021-11-15

## 2021-11-15 RX ORDER — TRAMADOL HYDROCHLORIDE 50 MG/1
50 TABLET, COATED ORAL
Refills: 0 | Status: DISCONTINUED | COMMUNITY
Start: 2018-06-20 | End: 2021-11-15

## 2021-12-03 ENCOUNTER — RX RENEWAL (OUTPATIENT)
Age: 86
End: 2021-12-03

## 2021-12-16 NOTE — HISTORY OF PRESENT ILLNESS
[FreeTextEntry1] : Kory is here with his wife. He is very itchy all over and it is driving him crazy. He mentioned it to renal and they did  not say anything. Asking if it is from medication. BP sometimes in the 90s after dialysis that responds to fluid bolus. No lightheadedness or dizziness on daily basis.

## 2021-12-16 NOTE — DISCUSSION/SUMMARY
[FreeTextEntry1] : The patient is an 85-year-old gentleman ex smoker, hypertension, hyperlipidemia, hypothyroidism, PVD,  CAD, chronic systolic heart failure, anemia, ESRD on dialysis, hypotension with diffuse puritis.\par #1 CAD- no angina or heart failure, continue aspirin, may stop plavix, no bleeding.\par #2 Htn/hypotension- remain off amlodipine, continue midodrine 20mg and fluorinef 0.1mg on dialysis days and reevaluate\par #3 PVC- resume toprol 25mg for PVC\par #4 Pericardial effusion- no symptoms, repeat echo today limited for effusion\par #5 Lipids- continue atorvastatin 20mg\par #6 Hypothyroid- therapeutic\par #7 ESRD- on dialysis Tues, Thurs, and Sat, discussed with Dr. Centeno from dialysis center, refer to dermatology for pruritis\par 12/16/21 Addendum: Effusion remains large with no improvement and persistent low blood pressures during dialysis. Recommend pericardiocentesis.\par

## 2021-12-30 ENCOUNTER — RX RENEWAL (OUTPATIENT)
Age: 86
End: 2021-12-30

## 2022-01-03 ENCOUNTER — APPOINTMENT (OUTPATIENT)
Dept: PULMONOLOGY | Facility: CLINIC | Age: 87
End: 2022-01-03
Payer: MEDICARE

## 2022-01-03 VITALS
TEMPERATURE: 97.1 F | WEIGHT: 164 LBS | OXYGEN SATURATION: 91 % | HEART RATE: 83 BPM | RESPIRATION RATE: 14 BRPM | HEIGHT: 65 IN | DIASTOLIC BLOOD PRESSURE: 71 MMHG | SYSTOLIC BLOOD PRESSURE: 123 MMHG | BODY MASS INDEX: 27.32 KG/M2

## 2022-01-03 DIAGNOSIS — H81.10 BENIGN PAROXYSMAL VERTIGO, UNSPECIFIED EAR: ICD-10-CM

## 2022-01-03 PROCEDURE — 99214 OFFICE O/P EST MOD 30 MIN: CPT | Mod: CS,25

## 2022-01-03 PROCEDURE — 96374 THER/PROPH/DIAG INJ IV PUSH: CPT

## 2022-01-03 RX ORDER — AMMONIUM LACTATE 12 %
12 CREAM (GRAM) TOPICAL
Qty: 385 | Refills: 0 | Status: ACTIVE | COMMUNITY
Start: 2021-12-15

## 2022-01-03 NOTE — REASON FOR VISIT
[Acute] : an acute visit [Cough] : cough [COPD] : COPD [Shortness of Breath] : shortness of breath [Family Member] : family member [TextBox_44] : COVID-19 pneumonia

## 2022-01-03 NOTE — REVIEW OF SYSTEMS
[Fever] : fever [Fatigue] : fatigue [Chills] : chills [Poor Appetite] : poor appetite [Cough] : cough [Hemoptysis] : no hemoptysis [Chest Tightness] : no chest tightness [Frequent URIs] : no frequent URIs [Sputum] : no sputum [Dyspnea] : dyspnea [Pleuritic Pain] : no pleuritic pain [Wheezing] : wheezing [A.M. Dry Mouth] : a.m. dry mouth [SOB on Exertion] : sob on exertion [Negative] : Endocrine

## 2022-01-03 NOTE — HISTORY OF PRESENT ILLNESS
[Former] : former [Never] : never [TextBox_4] : Patient is an 86-year-old male with past medical history significant for end-stage renal disease on hemodialysis Tuesday Thursday Saturday, coronary artery disease, diabetes, hypertension, GERD who was recently diagnosed with COVID-19 pneumonia.  The patient complains of cough shortness of breath dyspnea on exertion.  He also complains of intermittent fevers.  He was recently seen in the emergency room and sent home.  He denies any chest pain abdominal pain nausea or vomiting or diarrhea

## 2022-01-03 NOTE — ASSESSMENT
[FreeTextEntry1] : In summary the patient is an 86-year-old male with end-stage renal disease on hemodialysis, hypertension, GERD, coronary artery disease, diabetes who was recently diagnosed with COVID-19 pneumonia.  Patient's physical exam is significant for diffuse rhonchi with expiratory wheezing.  The patient was placed on 4 L nasal cannula.  Solu-Medrol 40 mg IV push was administered.  The patient is now started on dexamethasone as well as Symbicort and is instructed to follow-up in 2 days

## 2022-01-05 ENCOUNTER — APPOINTMENT (OUTPATIENT)
Dept: PULMONOLOGY | Facility: CLINIC | Age: 87
End: 2022-01-05
Payer: MEDICARE

## 2022-01-05 DIAGNOSIS — U07.1 COVID-19: ICD-10-CM

## 2022-01-05 DIAGNOSIS — I73.9 PERIPHERAL VASCULAR DISEASE, UNSPECIFIED: ICD-10-CM

## 2022-01-05 PROCEDURE — 99213 OFFICE O/P EST LOW 20 MIN: CPT | Mod: CS,95

## 2022-01-06 PROBLEM — U07.1 ACUTE COVID-19: Status: ACTIVE | Noted: 2022-01-03

## 2022-01-06 NOTE — HISTORY OF PRESENT ILLNESS
[Former] : former [Never] : never [TextBox_4] : Patient is an 86-year-old male with past medical history significant for COPD, atherosclerotic heart disease, Beatties, hypertension, end-stage renal disease on hemodialysis who is status post acute Covid and is now being evaluated via telemedicine.  The patient was seen 48 hours ago and was treated with bronchodilator therapy and IV Solu-Medrol.  He states his cough shortness of breath and fever have all improved.  He denies chest pain weight loss or hemoptysis

## 2022-01-06 NOTE — ASSESSMENT
[FreeTextEntry1] : In summary the patient is an 86-year-old male with end-stage renal disease on hemodialysis, atherosclerotic heart disease, hypertension, diabetes, COPD who was seen 2 days ago for acute COVID-19.  The patient has improved significantly with the initiation of steroids.  He is instructed to continue current therapy and to follow-up in 1 month

## 2022-01-06 NOTE — REASON FOR VISIT
[Follow-Up] : a follow-up visit [Cough] : cough [COPD] : COPD [Shortness of Breath] : shortness of breath [Family Member] : family member [TextBox_44] : COVID-19 pneumonia

## 2022-03-01 ENCOUNTER — RX RENEWAL (OUTPATIENT)
Age: 87
End: 2022-03-01

## 2022-03-21 ENCOUNTER — NON-APPOINTMENT (OUTPATIENT)
Age: 87
End: 2022-03-21

## 2022-03-21 ENCOUNTER — APPOINTMENT (OUTPATIENT)
Dept: CARDIOLOGY | Facility: CLINIC | Age: 87
End: 2022-03-21
Payer: MEDICARE

## 2022-03-21 VITALS
HEART RATE: 76 BPM | WEIGHT: 176 LBS | OXYGEN SATURATION: 95 % | DIASTOLIC BLOOD PRESSURE: 69 MMHG | HEIGHT: 65 IN | BODY MASS INDEX: 29.32 KG/M2 | SYSTOLIC BLOOD PRESSURE: 158 MMHG | RESPIRATION RATE: 12 BRPM

## 2022-03-21 DIAGNOSIS — N18.30 CHRONIC KIDNEY DISEASE, STAGE 3 UNSPECIFIED: ICD-10-CM

## 2022-03-21 PROCEDURE — 99214 OFFICE O/P EST MOD 30 MIN: CPT

## 2022-03-21 PROCEDURE — 93000 ELECTROCARDIOGRAM COMPLETE: CPT

## 2022-03-21 RX ORDER — DEXAMETHASONE 2 MG/1
2 TABLET ORAL
Qty: 30 | Refills: 0 | Status: DISCONTINUED | COMMUNITY
Start: 2022-01-03

## 2022-03-21 NOTE — DISCUSSION/SUMMARY
[FreeTextEntry1] : The patient is an 86-year-old gentleman ex smoker, hypertension, hyperlipidemia, hypothyroidism, PVD,  CAD, chronic systolic heart failure, anemia, ESRD on dialysis, hypotension during dialysis with large effusion.\par #1 CAD- no angina or heart failure, continue aspirin, no bleeding.\par #2 Htn/hypotension- remain off amlodipine, continue midodrine 20mg and fluorinef 0.1mg on dialysis days \par #3 PVC- continue toprol 25mg for PVC\par #4 Pericardial effusion- no symptoms, discussed diagnostic and possible therapeutic pericardiocentesis in view of low BP during dialysis again with patient after discussing with nephrology last week, he will speak to daughter and contact office with availability.\par #5 Lipids- continue atorvastatin 20mg\par #6 Hypothyroid- therapeutic\par #7 ESRD- on dialysis Tues, Thurs, and Sat, discussed with Dr. Centeno from dialysis center\par

## 2022-05-05 ENCOUNTER — APPOINTMENT (OUTPATIENT)
Dept: CARDIOLOGY | Facility: CLINIC | Age: 87
End: 2022-05-05
Payer: MEDICARE

## 2022-05-05 ENCOUNTER — NON-APPOINTMENT (OUTPATIENT)
Age: 87
End: 2022-05-05

## 2022-05-05 VITALS
OXYGEN SATURATION: 95 % | WEIGHT: 173 LBS | DIASTOLIC BLOOD PRESSURE: 70 MMHG | RESPIRATION RATE: 14 BRPM | TEMPERATURE: 98.2 F | SYSTOLIC BLOOD PRESSURE: 160 MMHG | HEIGHT: 65 IN | BODY MASS INDEX: 28.82 KG/M2 | HEART RATE: 79 BPM

## 2022-05-05 PROCEDURE — 99213 OFFICE O/P EST LOW 20 MIN: CPT

## 2022-05-05 PROCEDURE — 93000 ELECTROCARDIOGRAM COMPLETE: CPT

## 2022-05-05 NOTE — HISTORY OF PRESENT ILLNESS
[FreeTextEntry1] : Kory had a pain left side that radiated up and lasted several hours. Ambulance came and ecg normal. Pain resolved and has not come back. He had not eaten all day.

## 2022-05-05 NOTE — DISCUSSION/SUMMARY
[FreeTextEntry1] : The patient is an 86-year-old gentleman ex smoker, hypertension, hyperlipidemia, hypothyroidism, PVD,  CAD, chronic systolic heart failure, anemia, ESRD on dialysis, hypotension during dialysis with large effusion with episode of abdominal pain that resolved.\par #1 CAD- no angina or heart failure, continue aspirin, no bleeding.\par #2 Htn/hypotension- remain off amlodipine, continue midodrine 20mg and fluorinef 0.1mg on dialysis days \par #3 PVC- continue toprol 25mg for PVC\par #4 Pericardial effusion- no symptoms, discussed diagnostic and possible therapeutic pericardiocentesis in view of low BP during dialysis again with patient and daughter and now ready to schedule on a Monday\par #5 Lipids- continue atorvastatin 20mg\par #6 Hypothyroid- therapeutic\par #7 ESRD- on dialysis Tues, Thurs, and Sat, discussed with Dr. Centeno from dialysis center\par

## 2022-05-21 ENCOUNTER — OUTPATIENT (OUTPATIENT)
Dept: OUTPATIENT SERVICES | Facility: HOSPITAL | Age: 87
LOS: 1 days | End: 2022-05-21
Payer: MEDICARE

## 2022-05-21 DIAGNOSIS — Z11.52 ENCOUNTER FOR SCREENING FOR COVID-19: ICD-10-CM

## 2022-05-21 LAB — SARS-COV-2 RNA SPEC QL NAA+PROBE: SIGNIFICANT CHANGE UP

## 2022-05-21 PROCEDURE — C9803: CPT

## 2022-05-21 PROCEDURE — U0003: CPT

## 2022-05-21 PROCEDURE — U0005: CPT

## 2022-05-24 ENCOUNTER — APPOINTMENT (OUTPATIENT)
Dept: CV DIAGNOSITCS | Facility: HOSPITAL | Age: 87
End: 2022-05-24

## 2022-05-24 ENCOUNTER — OUTPATIENT (OUTPATIENT)
Dept: OUTPATIENT SERVICES | Facility: HOSPITAL | Age: 87
LOS: 1 days | End: 2022-05-24
Payer: MEDICARE

## 2022-05-24 PROCEDURE — 93306 TTE W/DOPPLER COMPLETE: CPT

## 2022-05-27 ENCOUNTER — RX RENEWAL (OUTPATIENT)
Age: 87
End: 2022-05-27

## 2022-06-10 DIAGNOSIS — I31.3 PERICARDIAL EFFUSION (NONINFLAMMATORY): ICD-10-CM

## 2022-06-26 ENCOUNTER — RX RENEWAL (OUTPATIENT)
Age: 87
End: 2022-06-26

## 2022-07-18 ENCOUNTER — APPOINTMENT (OUTPATIENT)
Dept: CARDIOLOGY | Facility: CLINIC | Age: 87
End: 2022-07-18

## 2022-08-19 ENCOUNTER — RX RENEWAL (OUTPATIENT)
Age: 87
End: 2022-08-19

## 2022-09-16 ENCOUNTER — RX RENEWAL (OUTPATIENT)
Age: 87
End: 2022-09-16

## 2022-09-16 RX ORDER — FLUTICASONE FUROATE, UMECLIDINIUM BROMIDE AND VILANTEROL TRIFENATATE 100; 62.5; 25 UG/1; UG/1; UG/1
100-62.5-25 POWDER RESPIRATORY (INHALATION) DAILY
Qty: 1 | Refills: 1 | Status: ACTIVE | COMMUNITY
Start: 2018-04-16 | End: 1900-01-01

## 2022-09-19 ENCOUNTER — APPOINTMENT (OUTPATIENT)
Dept: PULMONOLOGY | Facility: CLINIC | Age: 87
End: 2022-09-19

## 2022-09-29 ENCOUNTER — APPOINTMENT (OUTPATIENT)
Dept: CARDIOLOGY | Facility: CLINIC | Age: 87
End: 2022-09-29

## 2022-09-29 ENCOUNTER — NON-APPOINTMENT (OUTPATIENT)
Age: 87
End: 2022-09-29

## 2022-09-29 VITALS
WEIGHT: 174 LBS | SYSTOLIC BLOOD PRESSURE: 194 MMHG | RESPIRATION RATE: 14 BRPM | HEIGHT: 65 IN | DIASTOLIC BLOOD PRESSURE: 78 MMHG | HEART RATE: 74 BPM | OXYGEN SATURATION: 95 % | TEMPERATURE: 98.2 F | BODY MASS INDEX: 28.99 KG/M2

## 2022-09-29 DIAGNOSIS — I49.3 VENTRICULAR PREMATURE DEPOLARIZATION: ICD-10-CM

## 2022-09-29 PROCEDURE — 93000 ELECTROCARDIOGRAM COMPLETE: CPT

## 2022-09-29 PROCEDURE — 99214 OFFICE O/P EST MOD 30 MIN: CPT

## 2022-09-29 RX ORDER — ASPIRIN 81 MG/1
81 TABLET, COATED ORAL DAILY
Qty: 90 | Refills: 0 | Status: DISCONTINUED | COMMUNITY
Start: 2021-06-18 | End: 2022-09-29

## 2022-09-30 PROBLEM — I49.3 PREMATURE VENTRICULAR CONTRACTIONS: Status: ACTIVE | Noted: 2021-03-10

## 2022-09-30 NOTE — HISTORY OF PRESENT ILLNESS
[FreeTextEntry1] : Kory does not get out much because of back pain and left calf discomfort on exertion. No change in his breathing. Takes the midodrine and fluorinef prn on dialysis days.

## 2022-09-30 NOTE — DISCUSSION/SUMMARY
[FreeTextEntry1] : The patient is an 86-year-old gentleman ex smoker, HTN, HLD, hypothyroidism, PVD,  CAD, HFrEF, anemia, pericardial effusion, ESRD with hypotension during dialysis that is under control\par #1 CAD- no angina or heart failure, continue aspirin, no bleeding.\par #2 Htn/hypotension- remain off amlodipine, continue midodrine 20mg and fluorinef 0.1mg on dialysis days \par #3 PVC- continue toprol 25mg for PVC\par #4 Pericardial effusion- no symptoms, last ECHO demonstrated improvement\par #5 Lipids- continue atorvastatin 20mg\par #6 Hypothyroid- therapeutic\par #7 ESRD- on dialysis Tues, Thurs, and Sat, f/u Dr. Centeno from dialysis center\par  [EKG obtained to assist in diagnosis and management of assessed problem(s)] : EKG obtained to assist in diagnosis and management of assessed problem(s)

## 2022-10-17 ENCOUNTER — APPOINTMENT (OUTPATIENT)
Dept: PULMONOLOGY | Facility: CLINIC | Age: 87
End: 2022-10-17

## 2022-10-17 VITALS
RESPIRATION RATE: 14 BRPM | BODY MASS INDEX: 29.16 KG/M2 | WEIGHT: 175 LBS | HEIGHT: 65 IN | TEMPERATURE: 98.1 F | OXYGEN SATURATION: 94 % | HEART RATE: 82 BPM

## 2022-10-17 VITALS — SYSTOLIC BLOOD PRESSURE: 160 MMHG | DIASTOLIC BLOOD PRESSURE: 80 MMHG

## 2022-10-17 PROCEDURE — 99214 OFFICE O/P EST MOD 30 MIN: CPT

## 2022-10-17 NOTE — PHYSICAL EXAM
[No Acute Distress] : no acute distress [Normal Oropharynx] : normal oropharynx [Normal Appearance] : normal appearance [No Neck Mass] : no neck mass [Normal Rate/Rhythm] : normal rate/rhythm [Normal S1, S2] : normal s1, s2 [No Murmurs] : no murmurs [No Resp Distress] : no resp distress [Rhonchi] : rhonchi [No Abnormalities] : no abnormalities [Benign] : benign [Normal Gait] : normal gait [No Clubbing] : no clubbing [No Cyanosis] : no cyanosis [FROM] : FROM [No Focal Deficits] : no focal deficits [Oriented x3] : oriented x3 [Normal Affect] : normal affect [TextBox_125] : RUE ecchymoses

## 2022-10-17 NOTE — HISTORY OF PRESENT ILLNESS
[Former] : former [Never] : never [TextBox_4] : Patient is an 86-year-old male with past medical history significant for COPD, atherosclerotic heart disease, Beatties, hypertension, end-stage renal disease on hemodialysis presents for follow-up \par \par \par He reports increase in cough and sputum production. He endorses increased shortness of breath on exertion Denies any fevers, chills, chest pain or hemoptysis \par \par Of note he reports RUE pain and discomfort. He reports he was in [employer] and was getting items off the shelf and it fell on his arm

## 2022-10-17 NOTE — REASON FOR VISIT
[Follow-Up] : a follow-up visit [Cough] : cough [COPD] : COPD [Shortness of Breath] : shortness of breath [Family Member] : family member [TextBox_44] : COVID

## 2022-10-17 NOTE — ASSESSMENT
[FreeTextEntry1] : In summary the patient is an 86-year-old male with end-stage renal disease on hemodialysis, atherosclerotic heart disease, hypertension, diabetes, COPD who presents with a COPD exacerbation.  Patient's physical exam is significant for expiratory wheezing bilaterally.\par \par The patient was treated with albuterol 0.063 mg and 3 cc normal saline via nebulization and placed on 2 L nasal cannula.  The patient is instructed to continue current therapy.  He is scheduled for dialysis later today and follow-up in 1 month

## 2022-11-10 ENCOUNTER — RX RENEWAL (OUTPATIENT)
Age: 87
End: 2022-11-10

## 2022-12-09 ENCOUNTER — RX RENEWAL (OUTPATIENT)
Age: 87
End: 2022-12-09

## 2022-12-22 ENCOUNTER — APPOINTMENT (OUTPATIENT)
Dept: PULMONOLOGY | Facility: CLINIC | Age: 87
End: 2022-12-22

## 2022-12-22 VITALS
HEART RATE: 76 BPM | SYSTOLIC BLOOD PRESSURE: 145 MMHG | OXYGEN SATURATION: 91 % | TEMPERATURE: 98.1 F | DIASTOLIC BLOOD PRESSURE: 71 MMHG | RESPIRATION RATE: 16 BRPM

## 2022-12-22 DIAGNOSIS — J20.9 ACUTE BRONCHITIS, UNSPECIFIED: ICD-10-CM

## 2022-12-22 PROCEDURE — 99214 OFFICE O/P EST MOD 30 MIN: CPT

## 2022-12-22 RX ORDER — ALBUTEROL SULFATE 2.5 MG/3ML
(2.5 MG/3ML) SOLUTION RESPIRATORY (INHALATION)
Qty: 1 | Refills: 3 | Status: ACTIVE | COMMUNITY
Start: 2022-12-22 | End: 1900-01-01

## 2022-12-22 RX ORDER — AZITHROMYCIN 250 MG/1
250 TABLET, FILM COATED ORAL
Qty: 1 | Refills: 0 | Status: COMPLETED | COMMUNITY
Start: 2021-03-25 | End: 2022-12-22

## 2022-12-22 RX ORDER — AMOXICILLIN AND CLAVULANATE POTASSIUM 500; 125 MG/1; MG/1
500-125 TABLET, FILM COATED ORAL
Qty: 10 | Refills: 0 | Status: COMPLETED | COMMUNITY
Start: 2021-03-10 | End: 2022-12-22

## 2022-12-22 RX ORDER — SOFT LENS DISINFECTANT
SOLUTION, NON-ORAL MISCELLANEOUS
Qty: 1 | Refills: 0 | Status: ACTIVE | COMMUNITY
Start: 2022-12-22 | End: 1900-01-01

## 2022-12-22 RX ORDER — NEBULIZER ACCESSORIES
KIT MISCELLANEOUS
Qty: 1 | Refills: 1 | Status: ACTIVE | COMMUNITY
Start: 2022-12-22 | End: 1900-01-01

## 2022-12-22 RX ORDER — IPRATROPIUM BROMIDE AND ALBUTEROL SULFATE 2.5; .5 MG/3ML; MG/3ML
0.5-2.5 (3) SOLUTION RESPIRATORY (INHALATION)
Qty: 3 | Refills: 3 | Status: ACTIVE | COMMUNITY
Start: 2022-12-22 | End: 1900-01-01

## 2022-12-22 NOTE — REASON FOR VISIT
[Follow-Up] : a follow-up visit [Cough] : cough [COPD] : COPD [Shortness of Breath] : shortness of breath [Spouse] : spouse

## 2022-12-22 NOTE — ASSESSMENT
[FreeTextEntry1] : In summary the patient is an 87-year-old male with end-stage renal disease on hemodialysis, atherosclerotic heart disease, hypertension, diabetes, COPD who presents with a COPD exacerbation.  Patient's physical exam is significant for expiratory wheezing bilaterally.\par \par The patient was treated with albuterol 0.063 mg and 3 cc normal saline via nebulization and placed on 2 L nasal cannula.  The patient is instructed to continue current therapy.  He is scheduled for dialysis tomorrow

## 2022-12-22 NOTE — HISTORY OF PRESENT ILLNESS
[Former] : former [Never] : never [TextBox_4] : Patient is an 87-year-old male past medical history significant for COPD congestive heart failure atherosclerotic heart disease end-stage renal failure on hemodialysis who presents for an acute episode.  The patient is complaining of increasing cough shortness of breath and dyspnea on exertion over the last 24 hours.

## 2023-02-02 ENCOUNTER — APPOINTMENT (OUTPATIENT)
Dept: CARDIOLOGY | Facility: CLINIC | Age: 88
End: 2023-02-02
Payer: MEDICARE

## 2023-02-02 ENCOUNTER — NON-APPOINTMENT (OUTPATIENT)
Age: 88
End: 2023-02-02

## 2023-02-02 VITALS
DIASTOLIC BLOOD PRESSURE: 82 MMHG | OXYGEN SATURATION: 90 % | WEIGHT: 173 LBS | SYSTOLIC BLOOD PRESSURE: 216 MMHG | RESPIRATION RATE: 16 BRPM | TEMPERATURE: 97.9 F | HEART RATE: 77 BPM | HEIGHT: 65 IN | BODY MASS INDEX: 28.82 KG/M2

## 2023-02-02 PROCEDURE — 99214 OFFICE O/P EST MOD 30 MIN: CPT

## 2023-02-02 PROCEDURE — 93000 ELECTROCARDIOGRAM COMPLETE: CPT

## 2023-02-02 NOTE — REVIEW OF SYSTEMS
[Negative] : Heme/Lymph [SOB] : no shortness of breath [Dyspnea on exertion] : not dyspnea during exertion [Chest Discomfort] : no chest discomfort [Leg Claudication] : no intermittent leg claudication [Lower Ext Edema] : no extremity edema [Palpitations] : no palpitations [Orthopnea] : no orthopnea [PND] : no PND [Syncope] : no syncope

## 2023-02-02 NOTE — HISTORY OF PRESENT ILLNESS
[FreeTextEntry1] : Kory gets AGUIAR after two hours of dialysis. At 3 hours cannot take it. Finds SOB whenever walks now or even bending over. Went to audiologist. Otherwise in good spirits.

## 2023-02-02 NOTE — DISCUSSION/SUMMARY
[FreeTextEntry1] : The patient is an 87-year-old gentleman ex smoker, HTN, HLD, hypothyroidism, PVD,  CAD, HFrEF, anemia, pericardial effusion, ESRD with hypotension during dialysis that is under control\par #1 CAD- no angina or heart failure, continue aspirin, no bleeding, ECHO at next visit\par #2 Htn/hypotension- remain off amlodipine, continue midodrine 20mg and fluorinef 0.1mg on dialysis days \par #3 PVC- continue toprol 25mg for PVC\par #4 Pericardial effusion- no symptoms, last ECHO demonstrated improvement\par #5 Lipids- continue atorvastatin 20mg\par #6 Hypothyroid- therapeutic\par #7 ESRD- on dialysis Tues, Thurs, and Sat, f/u Dr. Centeno from dialysis center, add TSH, B12 and lipids at next blood draw\par  [EKG obtained to assist in diagnosis and management of assessed problem(s)] : EKG obtained to assist in diagnosis and management of assessed problem(s)

## 2023-02-06 ENCOUNTER — RX RENEWAL (OUTPATIENT)
Age: 88
End: 2023-02-06

## 2023-02-09 ENCOUNTER — APPOINTMENT (OUTPATIENT)
Dept: CARDIOLOGY | Facility: CLINIC | Age: 88
End: 2023-02-09

## 2023-03-06 ENCOUNTER — RX RENEWAL (OUTPATIENT)
Age: 88
End: 2023-03-06

## 2023-03-08 ENCOUNTER — RX RENEWAL (OUTPATIENT)
Age: 88
End: 2023-03-08

## 2023-03-08 RX ORDER — METOPROLOL SUCCINATE 25 MG/1
25 TABLET, EXTENDED RELEASE ORAL DAILY
Qty: 90 | Refills: 11 | Status: ACTIVE | COMMUNITY
Start: 2017-01-23 | End: 1900-01-01

## 2023-03-23 ENCOUNTER — NON-APPOINTMENT (OUTPATIENT)
Age: 88
End: 2023-03-23

## 2023-03-23 ENCOUNTER — APPOINTMENT (OUTPATIENT)
Dept: CARDIOLOGY | Facility: CLINIC | Age: 88
End: 2023-03-23
Payer: MEDICARE

## 2023-03-23 VITALS
WEIGHT: 173 LBS | SYSTOLIC BLOOD PRESSURE: 202 MMHG | HEART RATE: 77 BPM | BODY MASS INDEX: 28.82 KG/M2 | RESPIRATION RATE: 16 BRPM | TEMPERATURE: 97.9 F | DIASTOLIC BLOOD PRESSURE: 71 MMHG | HEIGHT: 65 IN | OXYGEN SATURATION: 91 %

## 2023-03-23 DIAGNOSIS — I95.3 HYPOTENSION OF HEMODIALYSIS: ICD-10-CM

## 2023-03-23 PROCEDURE — 99214 OFFICE O/P EST MOD 30 MIN: CPT

## 2023-03-23 PROCEDURE — 93000 ELECTROCARDIOGRAM COMPLETE: CPT

## 2023-03-24 PROBLEM — I95.3 HEMODIALYSIS-ASSOCIATED HYPOTENSION: Status: ACTIVE | Noted: 2021-03-10

## 2023-03-24 NOTE — DISCUSSION/SUMMARY
[FreeTextEntry1] : The patient is an 87-year-old gentleman ex smoker, HTN, HLD, hypothyroidism, PVD,  CAD, HFrEF, anemia, pericardial effusion, ESRD with hypotension during dialysis that is under control with possible angina.\par #1 CAD- no angina or heart failure, continue aspirin, no bleeding, discussed a trial of isosorbide 10 mg in the morning to evaluate if his symptoms improve, we did discuss cardiac catheterization but he is currently refusing, ECHO at next visit\par #2 Htn/hypotension- remain off amlodipine, continue midodrine 20mg and fluorinef 0.1mg on dialysis days \par #3 PVC- continue toprol 25mg for PVC\par #4 Pericardial effusion- no symptoms, last ECHO demonstrated improvement\par #5 Lipids- continue atorvastatin 20mg\par #6 Hypothyroid- therapeutic\par #7 ESRD- on dialysis Tues, Thurs, and Sat, f/u Dr. Centeno from dialysis center [EKG obtained to assist in diagnosis and management of assessed problem(s)] : EKG obtained to assist in diagnosis and management of assessed problem(s)

## 2023-03-24 NOTE — HISTORY OF PRESENT ILLNESS
[FreeTextEntry1] : Kory continues to have chest discomfort whenever he does anything or when he goes for dialysis.  It does not intensify it is just there.  He is very upset about it.  Denies any radiation or associated shortness of breath, diaphoresis or lightheadedness.

## 2023-05-04 ENCOUNTER — APPOINTMENT (OUTPATIENT)
Dept: CARDIOLOGY | Facility: CLINIC | Age: 88
End: 2023-05-04
Payer: MEDICARE

## 2023-05-04 ENCOUNTER — NON-APPOINTMENT (OUTPATIENT)
Age: 88
End: 2023-05-04

## 2023-05-04 VITALS
HEART RATE: 77 BPM | SYSTOLIC BLOOD PRESSURE: 208 MMHG | RESPIRATION RATE: 14 BRPM | DIASTOLIC BLOOD PRESSURE: 74 MMHG | WEIGHT: 172 LBS | HEIGHT: 65 IN | OXYGEN SATURATION: 95 % | TEMPERATURE: 97.9 F | BODY MASS INDEX: 28.66 KG/M2

## 2023-05-04 PROCEDURE — 99213 OFFICE O/P EST LOW 20 MIN: CPT

## 2023-05-04 PROCEDURE — 93000 ELECTROCARDIOGRAM COMPLETE: CPT

## 2023-05-04 RX ORDER — ISOSORBIDE DINITRATE 10 MG/1
10 TABLET ORAL EVERY 8 HOURS
Qty: 90 | Refills: 3 | Status: DISCONTINUED | COMMUNITY
Start: 2023-03-23 | End: 2023-05-04

## 2023-05-05 NOTE — DISCUSSION/SUMMARY
[FreeTextEntry1] : The patient is an 87-year-old gentleman ex smoker, HTN, HLD, hypothyroidism, PVD,  CAD, HFrEF, anemia, pericardial effusion, ESRD with hypotension during dialysis that is under control..\par #1 CAD- no angina or heart failure, continue aspirin, no bleeding, discussed a trial of isosorbide 10 mg in the morning to evaluate if his symptoms improve, we did discuss cardiac catheterization but he is currently refusing,\par #2 Htn/hypotension- remain off amlodipine, continue midodrine 20mg and fluorinef 0.1mg on dialysis days \par #3 PVC- continue toprol 25mg for PVC\par #4 Pericardial effusion- no symptoms, last ECHO demonstrated improvement\par #5 Lipids- continue atorvastatin 20mg\par #6 Hypothyroid- therapeutic\par #7 ESRD- on dialysis Tues, Thurs, and Sat, f/u Dr. Centeno from dialysis center [EKG obtained to assist in diagnosis and management of assessed problem(s)] : EKG obtained to assist in diagnosis and management of assessed problem(s)

## 2023-06-07 ENCOUNTER — RX RENEWAL (OUTPATIENT)
Age: 88
End: 2023-06-07

## 2023-06-08 ENCOUNTER — APPOINTMENT (OUTPATIENT)
Dept: CARDIOLOGY | Facility: CLINIC | Age: 88
End: 2023-06-08
Payer: MEDICARE

## 2023-06-08 ENCOUNTER — NON-APPOINTMENT (OUTPATIENT)
Age: 88
End: 2023-06-08

## 2023-06-08 VITALS
DIASTOLIC BLOOD PRESSURE: 88 MMHG | HEIGHT: 66 IN | OXYGEN SATURATION: 96 % | RESPIRATION RATE: 12 BRPM | SYSTOLIC BLOOD PRESSURE: 195 MMHG

## 2023-06-08 DIAGNOSIS — I20.9 ANGINA PECTORIS, UNSPECIFIED: ICD-10-CM

## 2023-06-08 PROCEDURE — 99215 OFFICE O/P EST HI 40 MIN: CPT

## 2023-06-08 PROCEDURE — 93000 ELECTROCARDIOGRAM COMPLETE: CPT

## 2023-06-09 PROBLEM — I20.9 ANGINA PECTORIS: Status: ACTIVE | Noted: 2023-03-23

## 2023-06-09 NOTE — DISCUSSION/SUMMARY
[FreeTextEntry1] : The patient is an 87-year-old gentleman ex smoker, HTN, HLD, hypothyroidism, PVD,  CAD, HFrEF, anemia, pericardial effusion, ESRD with hypotension during dialysis now with angina.\par #1 CAD- c/w aspirin, no bleeding,ready to proceed with cardiac catheterization \par #2 Htn/hypotension- remain off amlodipine, continue midodrine 20mg and fluorinef 0.1mg on dialysis days \par #3 PVC- continue toprol 25mg for PVC\par #4 Pericardial effusion- no symptoms, last ECHO demonstrated improvement\par #5 Lipids- continue atorvastatin 20mg\par #6 Hypothyroid- therapeutic\par #7 ESRD- on dialysis [place], Thurs, and Sat, f/u Dr. Centeno from dialysis center [EKG obtained to assist in diagnosis and management of assessed problem(s)] : EKG obtained to assist in diagnosis and management of assessed problem(s)

## 2023-06-09 NOTE — HISTORY OF PRESENT ILLNESS
[FreeTextEntry1] : Kory continues to have chest pain despite ranexa. Dr. Proctor believes his symptoms are true angina and referred him back here.

## 2023-06-22 ENCOUNTER — TRANSCRIPTION ENCOUNTER (OUTPATIENT)
Age: 88
End: 2023-06-22

## 2023-06-22 ENCOUNTER — INPATIENT (INPATIENT)
Facility: HOSPITAL | Age: 88
LOS: 0 days | Discharge: HOME | DRG: 246 | End: 2023-06-23
Attending: INTERNAL MEDICINE | Admitting: INTERNAL MEDICINE
Payer: COMMERCIAL

## 2023-06-22 VITALS
DIASTOLIC BLOOD PRESSURE: 77 MMHG | HEIGHT: 65 IN | SYSTOLIC BLOOD PRESSURE: 181 MMHG | WEIGHT: 169.76 LBS | OXYGEN SATURATION: 98 % | RESPIRATION RATE: 18 BRPM | TEMPERATURE: 98 F | HEART RATE: 82 BPM

## 2023-06-22 DIAGNOSIS — I31.39 OTHER PERICARDIAL EFFUSION (NONINFLAMMATORY): Chronic | ICD-10-CM

## 2023-06-22 DIAGNOSIS — Z95.5 PRESENCE OF CORONARY ANGIOPLASTY IMPLANT AND GRAFT: Chronic | ICD-10-CM

## 2023-06-22 DIAGNOSIS — R93.1 ABNORMAL FINDINGS ON DIAGNOSTIC IMAGING OF HEART AND CORONARY CIRCULATION: ICD-10-CM

## 2023-06-22 DIAGNOSIS — N18.6 END STAGE RENAL DISEASE: ICD-10-CM

## 2023-06-22 DIAGNOSIS — E87.5 HYPERKALEMIA: ICD-10-CM

## 2023-06-22 LAB
ANION GAP SERPL CALC-SCNC: 14 MMOL/L — SIGNIFICANT CHANGE UP (ref 5–17)
BUN SERPL-MCNC: 53 MG/DL — HIGH (ref 7–23)
CALCIUM SERPL-MCNC: 9.8 MG/DL — SIGNIFICANT CHANGE UP (ref 8.4–10.5)
CHLORIDE SERPL-SCNC: 102 MMOL/L — SIGNIFICANT CHANGE UP (ref 96–108)
CO2 SERPL-SCNC: 27 MMOL/L — SIGNIFICANT CHANGE UP (ref 22–31)
CREAT SERPL-MCNC: 6.57 MG/DL — HIGH (ref 0.5–1.3)
EGFR: 8 ML/MIN/1.73M2 — LOW
GLUCOSE SERPL-MCNC: 129 MG/DL — HIGH (ref 70–99)
HCT VFR BLD CALC: 39 % — SIGNIFICANT CHANGE UP (ref 39–50)
HGB BLD-MCNC: 11.9 G/DL — LOW (ref 13–17)
MCHC RBC-ENTMCNC: 30.5 GM/DL — LOW (ref 32–36)
MCHC RBC-ENTMCNC: 30.7 PG — SIGNIFICANT CHANGE UP (ref 27–34)
MCV RBC AUTO: 100.8 FL — HIGH (ref 80–100)
NRBC # BLD: 0 /100 WBCS — SIGNIFICANT CHANGE UP (ref 0–0)
PLATELET # BLD AUTO: 270 K/UL — SIGNIFICANT CHANGE UP (ref 150–400)
POTASSIUM SERPL-MCNC: 5.4 MMOL/L — HIGH (ref 3.5–5.3)
POTASSIUM SERPL-SCNC: 5.4 MMOL/L — HIGH (ref 3.5–5.3)
RBC # BLD: 3.87 M/UL — LOW (ref 4.2–5.8)
RBC # FLD: 17.3 % — HIGH (ref 10.3–14.5)
SODIUM SERPL-SCNC: 143 MMOL/L — SIGNIFICANT CHANGE UP (ref 135–145)
WBC # BLD: 6.43 K/UL — SIGNIFICANT CHANGE UP (ref 3.8–10.5)
WBC # FLD AUTO: 6.43 K/UL — SIGNIFICANT CHANGE UP (ref 3.8–10.5)

## 2023-06-22 PROCEDURE — 93010 ELECTROCARDIOGRAM REPORT: CPT

## 2023-06-22 PROCEDURE — 92928 PRQ TCAT PLMT NTRAC ST 1 LES: CPT | Mod: LD

## 2023-06-22 PROCEDURE — 93458 L HRT ARTERY/VENTRICLE ANGIO: CPT | Mod: 26,59

## 2023-06-22 PROCEDURE — 99152 MOD SED SAME PHYS/QHP 5/>YRS: CPT

## 2023-06-22 PROCEDURE — 99222 1ST HOSP IP/OBS MODERATE 55: CPT | Mod: GC

## 2023-06-22 RX ORDER — ATORVASTATIN CALCIUM 80 MG/1
40 TABLET, FILM COATED ORAL AT BEDTIME
Refills: 0 | Status: DISCONTINUED | OUTPATIENT
Start: 2023-06-22 | End: 2023-06-23

## 2023-06-22 RX ORDER — ASPIRIN/CALCIUM CARB/MAGNESIUM 324 MG
81 TABLET ORAL DAILY
Refills: 0 | Status: DISCONTINUED | OUTPATIENT
Start: 2023-06-23 | End: 2023-06-23

## 2023-06-22 RX ORDER — LEVOTHYROXINE SODIUM 125 MCG
175 TABLET ORAL DAILY
Refills: 0 | Status: DISCONTINUED | OUTPATIENT
Start: 2023-06-22 | End: 2023-06-23

## 2023-06-22 RX ORDER — ASPIRIN/CALCIUM CARB/MAGNESIUM 324 MG
1 TABLET ORAL
Refills: 0 | DISCHARGE

## 2023-06-22 RX ORDER — PANTOPRAZOLE SODIUM 20 MG/1
40 TABLET, DELAYED RELEASE ORAL
Refills: 0 | Status: DISCONTINUED | OUTPATIENT
Start: 2023-06-22 | End: 2023-06-23

## 2023-06-22 RX ORDER — SODIUM ZIRCONIUM CYCLOSILICATE 10 G/10G
10 POWDER, FOR SUSPENSION ORAL ONCE
Refills: 0 | Status: DISCONTINUED | OUTPATIENT
Start: 2023-06-22 | End: 2023-06-22

## 2023-06-22 RX ORDER — SODIUM ZIRCONIUM CYCLOSILICATE 10 G/10G
10 POWDER, FOR SUSPENSION ORAL
Refills: 0 | Status: DISCONTINUED | OUTPATIENT
Start: 2023-06-22 | End: 2023-06-23

## 2023-06-22 RX ORDER — CLOPIDOGREL BISULFATE 75 MG/1
1 TABLET, FILM COATED ORAL
Refills: 0 | DISCHARGE

## 2023-06-22 RX ORDER — SODIUM CHLORIDE 9 MG/ML
3 INJECTION INTRAMUSCULAR; INTRAVENOUS; SUBCUTANEOUS EVERY 8 HOURS
Refills: 0 | Status: DISCONTINUED | OUTPATIENT
Start: 2023-06-22 | End: 2023-06-23

## 2023-06-22 RX ORDER — METOPROLOL TARTRATE 50 MG
25 TABLET ORAL DAILY
Refills: 0 | Status: DISCONTINUED | OUTPATIENT
Start: 2023-06-22 | End: 2023-06-23

## 2023-06-22 RX ORDER — CLOPIDOGREL BISULFATE 75 MG/1
1 TABLET, FILM COATED ORAL
Qty: 90 | Refills: 3
Start: 2023-06-22 | End: 2024-06-15

## 2023-06-22 RX ORDER — SEVELAMER CARBONATE 2400 MG/1
1600 POWDER, FOR SUSPENSION ORAL THREE TIMES A DAY
Refills: 0 | Status: DISCONTINUED | OUTPATIENT
Start: 2023-06-22 | End: 2023-06-23

## 2023-06-22 RX ORDER — CLOPIDOGREL BISULFATE 75 MG/1
75 TABLET, FILM COATED ORAL DAILY
Refills: 0 | Status: DISCONTINUED | OUTPATIENT
Start: 2023-06-23 | End: 2023-06-23

## 2023-06-22 RX ORDER — CALCIUM ACETATE 667 MG
1334 TABLET ORAL
Refills: 0 | Status: DISCONTINUED | OUTPATIENT
Start: 2023-06-22 | End: 2023-06-23

## 2023-06-22 RX ORDER — SODIUM ZIRCONIUM CYCLOSILICATE 10 G/10G
10 POWDER, FOR SUSPENSION ORAL ONCE
Refills: 0 | Status: COMPLETED | OUTPATIENT
Start: 2023-06-22 | End: 2023-06-22

## 2023-06-22 RX ADMIN — SODIUM CHLORIDE 3 MILLILITER(S): 9 INJECTION INTRAMUSCULAR; INTRAVENOUS; SUBCUTANEOUS at 22:01

## 2023-06-22 RX ADMIN — SEVELAMER CARBONATE 1600 MILLIGRAM(S): 2400 POWDER, FOR SUSPENSION ORAL at 14:00

## 2023-06-22 RX ADMIN — Medication 25 MILLIGRAM(S): at 19:36

## 2023-06-22 RX ADMIN — Medication 1334 MILLIGRAM(S): at 19:36

## 2023-06-22 RX ADMIN — Medication 1334 MILLIGRAM(S): at 14:00

## 2023-06-22 RX ADMIN — SODIUM CHLORIDE 3 MILLILITER(S): 9 INJECTION INTRAMUSCULAR; INTRAVENOUS; SUBCUTANEOUS at 14:01

## 2023-06-22 RX ADMIN — SODIUM ZIRCONIUM CYCLOSILICATE 10 GRAM(S): 10 POWDER, FOR SUSPENSION ORAL at 14:00

## 2023-06-22 RX ADMIN — ATORVASTATIN CALCIUM 40 MILLIGRAM(S): 80 TABLET, FILM COATED ORAL at 19:36

## 2023-06-22 NOTE — H&P CARDIOLOGY - NSICDXPASTMEDICALHX_GEN_ALL_CORE_FT
PAST MEDICAL HISTORY:  Acute bronchitis     BPH (benign prostatic hyperplasia)     BPV (benign positional vertigo)     CAD (coronary artery disease)     COVID-19     End stage renal failure on dialysis M-W-F    HLD (hyperlipidemia)     HTN (hypertension)     Hypothyroidism     Pericardial effusion

## 2023-06-22 NOTE — CONSULT NOTE ADULT - SUBJECTIVE AND OBJECTIVE BOX
[facility] DIVISION OF KIDNEY DISEASES AND HYPERTENSION -- [phone]  -- INITIAL CONSULT NOTE  --------------------------------------------------------------------------------  HPI: 87-year-old  with PMH of ESRD on HD TIW (MWF), admitted to [facility] for PCI. Nephrology consulted for ESRD/HD management.     Pt. seen and examined today. Pt. receives his outpatient maintenance HD TIW [facility] [place]. Pt.'s outpatient nephrologist is Dr. Jo. Last OP HD was done on 6/21 via LUE AVF.    Pt reports feeling better. No fever, CP, SOB, HA, LE edema or dizziness during evaluation. Serum K mildly elevated at 5.4.    PAST HISTORY  --------------------------------------------------------------------------------  PAST MEDICAL & SURGICAL HISTORY:  Hypothyroidism  BPH (benign prostatic hyperplasia)  Acute bronchitis  COVID-19  CAD (coronary artery disease)  HLD (hyperlipidemia)  End stage renal failure on dialysis  M-W-F  BPV (benign positional vertigo)  HTN (hypertension)  Pericardial effusion  History of coronary artery stent placement  5 yrs ago  Pericardial effusion  drained        FAMILY HISTORY:    PAST SOCIAL HISTORY:    ALLERGIES & MEDICATIONS  --------------------------------------------------------------------------------  Allergies    No Known Allergies    Intolerances    acetaminophen (Other (Mild))    Standing Inpatient Medications  atorvastatin 40 milliGRAM(s) Oral at bedtime  calcium acetate 1334 milliGRAM(s) Oral three times a day with meals  levothyroxine 175 MICROGram(s) Oral daily  metoprolol succinate ER 25 milliGRAM(s) Oral daily  pantoprazole    Tablet 40 milliGRAM(s) Oral before breakfast  sevelamer carbonate 1600 milliGRAM(s) Oral three times a day  sodium chloride 0.9% lock flush 3 milliLiter(s) IV Push every 8 hours  sodium zirconium cyclosilicate 10 Gram(s) Oral once    PRN Inpatient Medications      REVIEW OF SYSTEMS  --------------------------------------------------------------------------------  Gen: No fevers/chills  Head/Eyes/Ears: No HA  Respiratory: No dyspnea, cough  CV: No chest pain, see HPI  GI: No abdominal pain, diarrhea  : on HD  MSK: No  edema  Skin: No rashes  Heme: No easy bruising or bleeding    VITALS/PHYSICAL EXAM  --------------------------------------------------------------------------------  T(C): 36.7 (06-22-23 @ 09:45), Max: 36.9 (06-22-23 @ 07:21)  HR: 64 (06-22-23 @ 12:30) (62 - 82)  BP: 153/62 (06-22-23 @ 12:30) (132/66 - 181/77)  RR: 15 (06-22-23 @ 12:30) (15 - 20)  SpO2: 96% (06-22-23 @ 12:30) (93% - 100%)  Wt(kg): --  Height (cm): 165.1 (06-22-23 @ 07:21)  Weight (kg): 77 (06-22-23 @ 07:21)  BMI (kg/m2): 28.2 (06-22-23 @ 07:21)  BSA (m2): 1.84 (06-22-23 @ 07:21)      Physical Exam:  	Gen: NAD  	HEENT: Anicteric  	Pulm: CTA B/L  	CV: S1S2+  	Abd: Soft, +BS    	Ext: No LE edema B/L  	Neuro: Awake       	Skin: Warm and dry  	Dialysis access: LUE AVF thrill felt and bruit heard    LABS/STUDIES  --------------------------------------------------------------------------------              11.9   6.43  >-----------<  270      [06-22-23 @ 07:41]              39.0     143  |  102  |  53  ----------------------------<  129      [06-22-23 @ 07:41]  5.4   |  27  |  6.57        Ca     9.8     [06-22-23 @ 07:41]    Creatinine Trend:  SCr 6.57 [06-22 @ 07:41]    Urinalysis - [06-22-23 @ 07:41]      Color  / Appearance  / SG  / pH       Gluc 129 / Ketone   / Bili  / Urobili        Blood  / Protein  / Leuk Est  / Nitrite       RBC  / WBC  / Hyaline  / Gran  / Sq Epi  / Non Sq Epi  / Bacteria

## 2023-06-22 NOTE — ASU PATIENT PROFILE, ADULT - FALL HARM RISK - UNIVERSAL INTERVENTIONS
Bed in lowest position, wheels locked, appropriate side rails in place/Call bell, personal items and telephone in reach/Instruct patient to call for assistance before getting out of bed or chair/Non-slip footwear when patient is out of bed/[place] to call system/Physically safe environment - no spills, clutter or unnecessary equipment/Purposeful Proactive Rounding/Room/bathroom lighting operational, light cord in reach

## 2023-06-22 NOTE — CONSULT NOTE ADULT - PROBLEM SELECTOR RECOMMENDATION 2
In setting of ESRD. Serum k mildly elevated at 5.4. Recommend medical management. Lokelma 10 gm once. Renal diet. Monitor serum K.

## 2023-06-22 NOTE — DISCHARGE NOTE PROVIDER - CARE PROVIDER_API CALL
Nella Barboza  Interventional Cardiology  [address] [place] [id number]  Phone: [phone]  Fax: [phone]  Follow Up Time: 2 weeks

## 2023-06-22 NOTE — DISCHARGE NOTE PROVIDER - HOSPITAL COURSE
86 y/o male with PMHx of HTN, HLD, Hypothyroidism, PVD, CAD- prior stents , HFrEF, anemia, pericardial effusion, ESRD on M/W/F, now presented with angina, seen & evaluated by Dr. Barboza & now referred for cardiac cath. Now pt has stent in LAD, Om disease for medical management. RFA site benign, VSS. Plan to dialysis tomorrow and and discharge home after dialysis.   Pt to f/u with Dr. Barboza in 2 weeks, continue ASA and Plavix and Statin 88 y/o male with PMHx of HTN, HLD, Hypothyroidism, PVD, CAD- prior stents , HFrEF, anemia, pericardial effusion, ESRD on M/W/F, now presented with angina, seen & evaluated by Dr. Barboza & now referred for cardiac cath.     6/22 cardiac cath with one stent in LAD, Om disease for medical management. RFA site benign, VSS.  Hemodialysis on 6/23 and discharge home after dialysis.  Pt to f/u with Dr. Barboza in 2 weeks, continue ASA and Plavix and Statin.

## 2023-06-22 NOTE — DISCHARGE NOTE PROVIDER - NSDCFUSCHEDAPPT_GEN_ALL_CORE_FT
Nella Barboza Physician [facility]  CARDIOLOGY [id number]  Scheduled Appointment: 08/17/2023

## 2023-06-22 NOTE — CONSULT NOTE ADULT - PROBLEM SELECTOR RECOMMENDATION 9
Pt. with ESRD on HD TIW (MWF). Last outpatient HD was on Wednesday, 6/21/23 via LUE AVF. Pt. clinically stable. Labs reviewed. HD consent obtained from pt, placed in chart. Will arrange for HD tomorrow (6/23). HD orders placed and discussed with RN. Monitor BP and labs.

## 2023-06-22 NOTE — H&P CARDIOLOGY - HISTORY OF PRESENT ILLNESS
88 y/o male with PMH of HTN, HLD, Hypothyroidism , PVD, CAD- prior stents , HfrEF, anemia, pericardial effusion , ESRD on TTS, now p/w angina, seen & evaluated by Dr Barboza & now recommends for C.

## 2023-06-22 NOTE — DISCHARGE NOTE PROVIDER - NSDCCPCAREPLAN_GEN_ALL_CORE_FT
PRINCIPAL DISCHARGE DIAGNOSIS  Diagnosis: CAD (coronary artery disease)  Assessment and Plan of Treatment: Low salt, low fat diet. Weight management.   If you have Stent in your heart, You must take Aspirin and Plavix (or Brilinta), must take medications as prescribed. Follow up with your cardiologist in 1-2 weeks.   If you are smoking, you need to STOP smoking.   Follow up appointments with your doctor(s)  as instructed.        SECONDARY DISCHARGE DIAGNOSES  Diagnosis: ESRD on dialysis  Assessment and Plan of Treatment: dialysis M/W/F as scheduled, f/u with kidney doctor, take medication as prescribed, low salt diet.    Diagnosis: HTN (hypertension)  Assessment and Plan of Treatment: Continue with your blood pressure medications; eat a heart healthy diet with low salt diet; exercise regularly (consult with your physician or cardiologist first); maintain a heart healthy weight; if you smoke - quit (A resource to help you stop smoking is the [facility] [employer] [facility] – phone number [phone].); include healthy ways to manage stress. Continue to follow with your primary care physician or cardiologist.    Diagnosis: HLD (hyperlipidemia)  Assessment and Plan of Treatment: Continue with your cholesterol medications. Eat a heart healthy diet that is low in saturated fats and salt, and includes whole grains, fruits, vegetables and lean protein; exercise regularly (consult with your physician or cardiologist first); maintain a heart healthy weight; if you smoke - quit (A resource to help you stop smoking is the [facility] [employer] [facility] – phone number [phone].). Continue to follow with your primary physician or cardiologist.

## 2023-06-22 NOTE — DISCHARGE NOTE PROVIDER - NSDCCPTREATMENT_GEN_ALL_CORE_FT
PRINCIPAL PROCEDURE  Procedure: Angioplasty, coronary artery, with stent insertion, in cardiac catheterization laboratory  Findings and Treatment:      PRINCIPAL PROCEDURE  Procedure: Angioplasty, coronary artery, with stent insertion, in cardiac catheterization laboratory  Findings and Treatment: One stent to the LAD. Continue DAPT with aspirin, Plavix.

## 2023-06-22 NOTE — PROVIDER CONTACT NOTE (OTHER) - ASSESSMENT
axox4. vss. H/H Stable. Pt had bright red blood noted in toilet after BM. Pt states he has had this at home and has hemmorhoids. pt denies dizziness or lightheadedness. Denies pain. RFA site dry and intact no bleeding or hematoma

## 2023-06-22 NOTE — CHART NOTE - NSCHARTNOTEFT_GEN_A_CORE
Removal of Femoral Sheath    Pulses in the right lower extremity are palpable.  The patient was placed in the supine position. The insertion site was identified and the sutures were removed per protocol.    The __6__ [place] femoral sheath was then removed.   Direct pressure was applied for  __20____ minutes.   Complications: None, VSS, Good Hemostasis.     Monitoring of the right/left groin and both lower extremities including neuro-vascular checks and vital signs every 15 minutes x 4, then every 30 minutes x 2, then every 1 hour x2 then Q 4 hours for 72 hours was ordered.    Discharge Instruction discussed with patient: ASA, Plavix/Brilinta, statin, diet, activities, access site care, follow up care, reportable signs and symptoms.     A/P   86 y/o male with PMH of HTN, HLD, Hypothyroidism , PVD, CAD- prior stents , HfrEF, anemia, pericardial effusion , ESRD on TTS, now p/w angina, s/p SKYLAR x 1 in LAD Via RFA access    Continue to monitor,   Dialysis in am prior to discharge  continue ASA, Plavix and statin,   Diet and exercise after 5 days,   f/u with Dr. Barboza in 2 weeks

## 2023-06-22 NOTE — CONSULT NOTE ADULT - ATTENDING COMMENTS
ESRD, mild hyperkalemia  PCI  Routine HD on MWF  Will arrange for dialysis tomorrow  Remainder per fellow, will follow

## 2023-06-22 NOTE — PROVIDER CONTACT NOTE (OTHER) - SITUATION
Pt is sp PCI. Upon pt using the restroom RN noted bright red blood in toilet. RFA site stable no bleeding. Per pt he has had this at home from hemmorhoids.

## 2023-06-22 NOTE — DISCHARGE NOTE PROVIDER - NSDCFUADDINST_GEN_ALL_CORE_FT
Wound Care:   the day AFTER your procedure remove bandage GENTLY, and clean using  mild soap and warm water stream, pat dry. Leave the area OPEN to air. YOU MAY SHOWER 24 hour after procedure.   DO NOT apply lotions, creams, ointments, powder, perfumes to the puncture site.  DO NOT SOAK the site for 1 week (no tub bath, no swimming, etc.)  Check  your groin and /or wrist daily. A small amount of bruising and sourness are normal.     ACTIVITY: for 24 hours   - DO NOT DRIVE  - DO NOT make any important decisions or sign legal documents   - DO NOT operate heavy duty machineries   - you may resume sexual activity in 48 hours, unless otherwise instructed by your cardiologist     If your procedure was done through the WRIST: for the NEXT 3DAYS:  - avoid pushing, pulling, with that affected wrist   - avoid repeated movement of that hand and wrist (eg: typing, hammering)  - DO NOT LIFT anything more than 5 lbs     If your procedure was done through the GROIN: for the NEXT 5 DAYS  - Limit climbing stairs, DO NOT soak in bathtub or pool  - no strenuous activities, pushing, pulling, straining  - Do not lift anything 10lbs or heavier     MEDICATION:   Take your medications as explained (see discharge paperwork)   If you received a STENT, you will be taking antiplatelet medications to KEEP YOUR STENT OPEN (eg: Aspirin, Plavix, Brilinta, Effient, etc).  Take as prescribed, DO NOT STOP taking them without consulting with your cardiologist first.     Follow heart healthy diet recommended by your doctor, if you smoke STOP SMOKING (may call [phone] for center of tobacco control if you need assistance)     CALL your doctor to make appointment in 2 WEEKS     ***CALL YOUR DOCTOR***  if you experience: fever, chills, body aches, or severe pain, swelling, redness, heat or yellow discharge at incision site  If you experience Bleeding or excruciating pain at the procedural site, swelling (golf ball size) at your procedural site  If you experience CHEST PAIN  If you experience extremity numbness, tingling, temperature change (of your procedural site)   If you are unable to reach your doctor, you may contact:   -Cardiology Office at [facility] at [phone] or [facility] [phone]- [facility] [phone]

## 2023-06-22 NOTE — DISCHARGE NOTE PROVIDER - NSDCMRMEDTOKEN_GEN_ALL_CORE_FT
aspirin 81 mg oral delayed release tablet: 1 tablet with sensor orally once a day MDD: 1  diclofenac 1.3% topical film, extended release: Apply topically to affected area once a day  Lipitor 20 mg oral tablet: 1 tab(s) orally once a day (at bedtime)  Lokelma 10 g oral powder for reconstitution: 10 gram(s) orally Saturday and Sunday  PhosLo 667 mg oral tablet: 3 tab(s) orally 3 times a day  Plavix 75 mg oral tablet: 1 tab(s) orally once a day to keep stent open MDD: 90  PriLOSEC 20 mg oral delayed release capsule: 1 cap(s) orally once a day  Renvela 800 mg oral tablet: 1 tab(s) orally 3 times a day  Synthroid 175 mcg (0.175 mg) oral tablet: 1 tab(s) orally once a day  Toprol-XL 25 mg oral tablet, extended release: 1 tab(s) orally once a day  Trelegy Ellipta 100 mcg-62.5 mcg-25 mcg/inh inhalation powder: 1 puff(s) inhaled once a day

## 2023-06-23 ENCOUNTER — TRANSCRIPTION ENCOUNTER (OUTPATIENT)
Age: 88
End: 2023-06-23

## 2023-06-23 VITALS
TEMPERATURE: 98 F | DIASTOLIC BLOOD PRESSURE: 84 MMHG | SYSTOLIC BLOOD PRESSURE: 143 MMHG | HEART RATE: 68 BPM | OXYGEN SATURATION: 98 % | RESPIRATION RATE: 17 BRPM

## 2023-06-23 DIAGNOSIS — E78.5 HYPERLIPIDEMIA, UNSPECIFIED: ICD-10-CM

## 2023-06-23 DIAGNOSIS — I10 ESSENTIAL (PRIMARY) HYPERTENSION: ICD-10-CM

## 2023-06-23 DIAGNOSIS — I25.10 ATHEROSCLEROTIC HEART DISEASE OF NATIVE CORONARY ARTERY WITHOUT ANGINA PECTORIS: ICD-10-CM

## 2023-06-23 LAB
ANION GAP SERPL CALC-SCNC: 12 MMOL/L — SIGNIFICANT CHANGE UP (ref 5–17)
BUN SERPL-MCNC: 32 MG/DL — HIGH (ref 7–23)
CALCIUM SERPL-MCNC: 9.3 MG/DL — SIGNIFICANT CHANGE UP (ref 8.4–10.5)
CHLORIDE SERPL-SCNC: 99 MMOL/L — SIGNIFICANT CHANGE UP (ref 96–108)
CO2 SERPL-SCNC: 31 MMOL/L — SIGNIFICANT CHANGE UP (ref 22–31)
CREAT SERPL-MCNC: 4.84 MG/DL — HIGH (ref 0.5–1.3)
EGFR: 11 ML/MIN/1.73M2 — LOW
GLUCOSE SERPL-MCNC: 116 MG/DL — HIGH (ref 70–99)
POTASSIUM SERPL-MCNC: 4.4 MMOL/L — SIGNIFICANT CHANGE UP (ref 3.5–5.3)
POTASSIUM SERPL-SCNC: 4.4 MMOL/L — SIGNIFICANT CHANGE UP (ref 3.5–5.3)
SODIUM SERPL-SCNC: 142 MMOL/L — SIGNIFICANT CHANGE UP (ref 135–145)

## 2023-06-23 PROCEDURE — C1769: CPT

## 2023-06-23 PROCEDURE — 80048 BASIC METABOLIC PNL TOTAL CA: CPT

## 2023-06-23 PROCEDURE — C1894: CPT

## 2023-06-23 PROCEDURE — C9600: CPT | Mod: LD

## 2023-06-23 PROCEDURE — 92928 PRQ TCAT PLMT NTRAC ST 1 LES: CPT

## 2023-06-23 PROCEDURE — C1887: CPT

## 2023-06-23 PROCEDURE — 93005 ELECTROCARDIOGRAM TRACING: CPT

## 2023-06-23 PROCEDURE — C1874: CPT

## 2023-06-23 PROCEDURE — 93458 L HRT ARTERY/VENTRICLE ANGIO: CPT

## 2023-06-23 PROCEDURE — C1725: CPT

## 2023-06-23 PROCEDURE — 99232 SBSQ HOSP IP/OBS MODERATE 35: CPT

## 2023-06-23 PROCEDURE — 85027 COMPLETE CBC AUTOMATED: CPT

## 2023-06-23 PROCEDURE — 90935 HEMODIALYSIS ONE EVALUATION: CPT | Mod: GC

## 2023-06-23 PROCEDURE — 99261: CPT

## 2023-06-23 RX ORDER — ASPIRIN/CALCIUM CARB/MAGNESIUM 324 MG
1 TABLET ORAL
Qty: 90 | Refills: 3
Start: 2023-06-23 | End: 2024-06-16

## 2023-06-23 RX ADMIN — Medication 600 MILLIGRAM(S): at 05:04

## 2023-06-23 RX ADMIN — Medication 175 MICROGRAM(S): at 05:03

## 2023-06-23 RX ADMIN — Medication 1334 MILLIGRAM(S): at 07:50

## 2023-06-23 RX ADMIN — Medication 1334 MILLIGRAM(S): at 16:27

## 2023-06-23 RX ADMIN — SEVELAMER CARBONATE 1600 MILLIGRAM(S): 2400 POWDER, FOR SUSPENSION ORAL at 16:27

## 2023-06-23 RX ADMIN — SEVELAMER CARBONATE 1600 MILLIGRAM(S): 2400 POWDER, FOR SUSPENSION ORAL at 08:03

## 2023-06-23 RX ADMIN — PANTOPRAZOLE SODIUM 40 MILLIGRAM(S): 20 TABLET, DELAYED RELEASE ORAL at 05:47

## 2023-06-23 RX ADMIN — CLOPIDOGREL BISULFATE 75 MILLIGRAM(S): 75 TABLET, FILM COATED ORAL at 05:04

## 2023-06-23 RX ADMIN — Medication 81 MILLIGRAM(S): at 05:03

## 2023-06-23 RX ADMIN — SODIUM CHLORIDE 3 MILLILITER(S): 9 INJECTION INTRAMUSCULAR; INTRAVENOUS; SUBCUTANEOUS at 13:03

## 2023-06-23 RX ADMIN — Medication 25 MILLIGRAM(S): at 12:58

## 2023-06-23 RX ADMIN — SEVELAMER CARBONATE 1600 MILLIGRAM(S): 2400 POWDER, FOR SUSPENSION ORAL at 12:58

## 2023-06-23 RX ADMIN — SODIUM CHLORIDE 3 MILLILITER(S): 9 INJECTION INTRAMUSCULAR; INTRAVENOUS; SUBCUTANEOUS at 05:02

## 2023-06-23 RX ADMIN — Medication 1334 MILLIGRAM(S): at 12:58

## 2023-06-23 NOTE — PROGRESS NOTE ADULT - ATTENDING COMMENTS
ESRD. Seen and examined on dialysis  Tolerating without issue.    Hemodialysis Treatment.:     Schedule: Once, Modality: Hemodialysis, Access: Arteriovenous Fistula    Dialyzer: Revaclear 400 (Max), Time: 180 Min    Blood Flow: 400 mL/Min , Dialysate Flow: 600 mL/Min, Dialysate Temp: 36.5, Tubinmm (Adult)    Target Fluid Removal: 2 Liters    Dialysate Electrolytes (mEq/L):  Calcium 2.5, Sodium 138, Bicarbonate 35    Potassium Protocol  -->For serum potassium LESS THAN or EQUAL TO 3.4, notify MD/PA/NP       For serum potassium 3.5 - 4, use 3K dialysate bath       For serum potassium 4.1 - 5.9, use 2K dialysate bath       For serum potassium GREATER THAN or EQUAL TO 6, notify MD/PA/NP    Additional Instructions: Keep SBP> 100 mg.   1st shift. (23 @ 22:35)

## 2023-06-23 NOTE — PROGRESS NOTE ADULT - PROBLEM SELECTOR PLAN 2
Continue antihypertensive medications with hold parameters
In setting of ESRD. Serum k mildly elevated at 5.4 on 6/22. Pt received medical management. Check BMP later today. Renal diet. Monitor serum K.

## 2023-06-23 NOTE — DISCHARGE NOTE NURSING/CASE MANAGEMENT/SOCIAL WORK - PATIENT PORTAL LINK FT
You can access the FollowMyHealth Patient Portal offered by [facility] by registering at the following website: [email or web address]. By joining [employer]’s FollowMyHealth portal, you will also be able to view your health information using other applications (apps) compatible with our system.

## 2023-06-23 NOTE — PROGRESS NOTE ADULT - PROBLEM SELECTOR PLAN 1
Monitor groin site for swelling, bleeding  Continue ASA, Plavix
Pt. with ESRD on HD TIW (MWF). Pt. seen during HD treatment today. Pt developed intra dialytic hypotension during treatment, UF goal was decreased to 1L. BP subsequently improved. Labs reviewed. Plan for next HD treatment on Monday. Serum hemoglobin above target range. Check serum phosphorus levels. Pt on PO phos binders. Monitor BP and labs.

## 2023-06-23 NOTE — DISCHARGE NOTE NURSING/CASE MANAGEMENT/SOCIAL WORK - NSDCPEFALRISK_GEN_ALL_CORE
For information on Fall & Injury Prevention, visit: [email or web address] OR  [email or web address] OR  https://www.cdc.gov/steadi/patient.html

## 2023-06-23 NOTE — PROGRESS NOTE ADULT - SUBJECTIVE AND OBJECTIVE BOX
[facility] DIVISION OF KIDNEY DISEASES AND HYPERTENSION   FOLLOW UP NOTE    --------------------------------------------------------------------------------  Chief Complaint: ESRD on HD TIW MWF    24 hour events/subjective: Pt. was seen and examined today during HD treatment. Pt reports no complaints.       PAST HISTORY  --------------------------------------------------------------------------------  No significant changes to PMH, PSH, FHx, SHx, unless otherwise noted    ALLERGIES & MEDICATIONS  --------------------------------------------------------------------------------  Allergies    No Known Allergies    Intolerances    acetaminophen (Other (Mild))    Standing Inpatient Medications  aspirin enteric coated 81 milliGRAM(s) Oral daily  atorvastatin 40 milliGRAM(s) Oral at bedtime  calcium acetate 1334 milliGRAM(s) Oral three times a day with meals  clopidogrel Tablet 75 milliGRAM(s) Oral daily  guaiFENesin  milliGRAM(s) Oral every 12 hours  levothyroxine 175 MICROGram(s) Oral daily  metoprolol succinate ER 25 milliGRAM(s) Oral daily  pantoprazole    Tablet 40 milliGRAM(s) Oral before breakfast  sevelamer carbonate 1600 milliGRAM(s) Oral three times a day  sodium chloride 0.9% lock flush 3 milliLiter(s) IV Push every 8 hours  sodium zirconium cyclosilicate 10 Gram(s) Oral <User Schedule>    PRN Inpatient Medications      REVIEW OF SYSTEMS  --------------------------------------------------------------------------------  Gen: No fevers/chills  Head/Eyes/Ears: No HA  Respiratory: No dyspnea, cough  CV: No chest pain, see HPI  GI: No abdominal pain, diarrhea  : on HD  MSK: No  edema  Skin: No rashes  Heme: No easy bruising or bleeding    VITALS/PHYSICAL EXAM  --------------------------------------------------------------------------------  T(C): 36.5 (06-23-23 @ 09:12), Max: 36.7 (06-22-23 @ 19:30)  HR: 67 (06-23-23 @ 09:12) (62 - 74)  BP: 125/74 (06-23-23 @ 09:12) (123/50 - 179/78)  RR: 18 (06-23-23 @ 09:12) (13 - 20)  SpO2: 100% (06-23-23 @ 08:26) (96% - 100%)  Wt(kg): --  Height (cm): 165.1 (06-22-23 @ 07:21)  Weight (kg): 77 (06-22-23 @ 07:21)  BMI (kg/m2): 28.2 (06-22-23 @ 07:21)  BSA (m2): 1.84 (06-22-23 @ 07:21)      Physical Exam:  	Gen: NAD  	HEENT: Anicteric  	Pulm: CTA B/L  	CV: S1S2+  	Abd: Soft, +BS    	Ext: No LE edema B/L  	Neuro: Awake       	Skin: Warm and dry  	Dialysis access: LUE AVF connected to HD machine    LABS/STUDIES  --------------------------------------------------------------------------------              11.9   6.43  >-----------<  270      [06-22-23 @ 07:41]              39.0     143  |  102  |  53  ----------------------------<  129      [06-22-23 @ 07:41]  5.4   |  27  |  6.57        Ca     9.8     [06-22-23 @ 07:41]    Creatinine Trend:  SCr 6.57 [06-22 @ 07:41]    Urinalysis - [06-22-23 @ 07:41]      Color  / Appearance  / SG  / pH       Gluc 129 / Ketone   / Bili  / Urobili        Blood  / Protein  / Leuk Est  / Nitrite       RBC  / WBC  / Hyaline  / Gran  / Sq Epi  / Non Sq Epi  / Bacteria   
HPI:  86 y/o male with PMH of HTN, HLD, Hypothyroidism , PVD, CAD- prior stents , HfrEF, anemia, pericardial effusion , ESRD on TTS, now p/w angina, seen & evaluated by Dr Barboza & now recommends for [facility]. (22 Jun 2023 07:30)    Patient is a 87y old  Male who presents with a chief complaint of angina s/p stent in LAD (22 Jun 2023 15:25)          Allergies    No Known Allergies    Intolerances    acetaminophen (Other (Mild))      Medications:  aspirin enteric coated 81 milliGRAM(s) Oral daily  atorvastatin 40 milliGRAM(s) Oral at bedtime  calcium acetate 1334 milliGRAM(s) Oral three times a day with meals  clopidogrel Tablet 75 milliGRAM(s) Oral daily  guaiFENesin  milliGRAM(s) Oral every 12 hours  levothyroxine 175 MICROGram(s) Oral daily  metoprolol succinate ER 25 milliGRAM(s) Oral daily  pantoprazole    Tablet 40 milliGRAM(s) Oral before breakfast  sevelamer carbonate 1600 milliGRAM(s) Oral three times a day  sodium chloride 0.9% lock flush 3 milliLiter(s) IV Push every 8 hours  sodium zirconium cyclosilicate 10 Gram(s) Oral <User Schedule>      Vitals:  T(C): 36.7 (06-22-23 @ 19:30), Max: 36.9 (06-22-23 @ 07:21)  HR: 68 (06-22-23 @ 19:30) (62 - 82)  BP: 128/54 (06-22-23 @ 19:30) (123/50 - 181/77)  BP(mean): 73 (06-22-23 @ 19:30) (69 - 107)  RR: 20 (06-22-23 @ 19:30) (13 - 20)  SpO2: 100% (06-22-23 @ 19:30) (93% - 100%)  Wt(kg): --  Daily Height in cm: 165.1 (22 Jun 2023 07:21)    Daily   I&O's Summary        Physical Exam:  Appearance: Normal  Eyes: PERRL, EOMI  HENT: Normal oral mus[place]a, NC/AT  Cardiovascular: S1S2, RRR, No M/R/G, no JVD, No Lower extremity edema  Procedural Access Site: No hematoma, Non-tender to palpation, 2+ pulse, No bruit, No Ecchymosis  Respiratory: Clear to auscultation bilaterally  Gastrointestinal: Soft, Non tender, Normal Bowel Sounds  Musculoskeletal: No clubbing, No joint deformity   Neurologic: Non-focal  Lymphatic: No lymphadenopathy  Psychiatry: AAOx3, Mood & affect appropriate  Skin: No rashes, No ecchymoses, No cyanosis    06-22    143  |  102  |  53<H>  ----------------------------<  129<H>  5.4<H>   |  27  |  6.57<H>    Ca    9.8      22 Jun 2023 07:41              Lipid panel   Hgb A1c                         11.9   6.43  )-----------( 270      ( 22 Jun 2023 07:41 )             39.0         ECG: SR 65 bpm

## 2023-06-26 ENCOUNTER — TRANSCRIPTION ENCOUNTER (OUTPATIENT)
Age: 88
End: 2023-06-26

## 2023-08-17 ENCOUNTER — NON-APPOINTMENT (OUTPATIENT)
Age: 88
End: 2023-08-17

## 2023-08-17 ENCOUNTER — APPOINTMENT (OUTPATIENT)
Dept: CARDIOLOGY | Facility: CLINIC | Age: 88
End: 2023-08-17
Payer: MEDICARE

## 2023-08-17 VITALS
DIASTOLIC BLOOD PRESSURE: 68 MMHG | BODY MASS INDEX: 27.97 KG/M2 | HEIGHT: 66 IN | OXYGEN SATURATION: 95 % | TEMPERATURE: 97.6 F | RESPIRATION RATE: 12 BRPM | WEIGHT: 174 LBS | SYSTOLIC BLOOD PRESSURE: 162 MMHG | HEART RATE: 70 BPM

## 2023-08-17 DIAGNOSIS — E78.5 HYPERLIPIDEMIA, UNSPECIFIED: ICD-10-CM

## 2023-08-17 PROCEDURE — 93000 ELECTROCARDIOGRAM COMPLETE: CPT

## 2023-08-17 PROCEDURE — 99214 OFFICE O/P EST MOD 30 MIN: CPT

## 2023-08-18 NOTE — HISTORY OF PRESENT ILLNESS
[FreeTextEntry1] : Kory is feeling better after stent. No further angina. Helps to drink some water during dialysis to keep his blood pressure up.

## 2023-08-18 NOTE — DISCUSSION/SUMMARY
[FreeTextEntry1] : The patient is an 87-year-old gentleman ex-smoker, HTN, HLD, hypothyroidism, PVD, CAD, HFrEF, anemia, pericardial effusion, ESRDs/p LAD stent whose angina resolved. #1 CAD- prior LAD and Cx stents patent, s/p mLAD stent, c/w DAPT, no angina. #2 Htn/hypotension- remain off amlodipine, c/w midodrine 20mg and fluorinef 0.1mg on dialysis days  #3 PVC- c/w toprol 25mg for PVC #4 Pericardial effusion- no symptoms, last ECHO demonstrated improvement. #5 HLD- c/w atorvastatin 20mg #6 Hypothyroid- therapeutic #7 ESRD- on dialysis [facility], and Sat, f/u Dr. Centeno from dialysis center [EKG obtained to assist in diagnosis and management of assessed problem(s)] : EKG obtained to assist in diagnosis and management of assessed problem(s)

## 2023-08-21 ENCOUNTER — RX RENEWAL (OUTPATIENT)
Age: 88
End: 2023-08-21

## 2023-09-19 ENCOUNTER — RX RENEWAL (OUTPATIENT)
Age: 88
End: 2023-09-19

## 2023-10-17 ENCOUNTER — RX RENEWAL (OUTPATIENT)
Age: 88
End: 2023-10-17

## 2023-10-31 PROBLEM — E03.9 HYPOTHYROIDISM, UNSPECIFIED: Chronic | Status: ACTIVE | Noted: 2023-06-22

## 2023-10-31 PROBLEM — N40.0 BENIGN PROSTATIC HYPERPLASIA WITHOUT LOWER URINARY TRACT SYMPTOMS: Chronic | Status: ACTIVE | Noted: 2023-06-22

## 2023-10-31 PROBLEM — I10 ESSENTIAL (PRIMARY) HYPERTENSION: Chronic | Status: ACTIVE | Noted: 2023-06-22

## 2023-10-31 PROBLEM — H81.10 BENIGN PAROXYSMAL VERTIGO, UNSPECIFIED EAR: Chronic | Status: ACTIVE | Noted: 2023-06-22

## 2023-10-31 PROBLEM — U07.1 COVID-19: Chronic | Status: ACTIVE | Noted: 2023-06-22

## 2023-10-31 PROBLEM — J20.9 ACUTE BRONCHITIS, UNSPECIFIED: Chronic | Status: ACTIVE | Noted: 2023-06-22

## 2023-10-31 PROBLEM — N18.6 END STAGE RENAL DISEASE: Chronic | Status: ACTIVE | Noted: 2023-06-22

## 2023-10-31 PROBLEM — I31.39 OTHER PERICARDIAL EFFUSION (NONINFLAMMATORY): Chronic | Status: ACTIVE | Noted: 2023-06-22

## 2023-10-31 PROBLEM — I25.10 ATHEROSCLEROTIC HEART DISEASE OF NATIVE CORONARY ARTERY WITHOUT ANGINA PECTORIS: Chronic | Status: ACTIVE | Noted: 2023-06-22

## 2023-10-31 PROBLEM — E78.5 HYPERLIPIDEMIA, UNSPECIFIED: Chronic | Status: ACTIVE | Noted: 2023-06-22

## 2023-11-01 ENCOUNTER — APPOINTMENT (OUTPATIENT)
Dept: CARDIOLOGY | Facility: CLINIC | Age: 88
End: 2023-11-01
Payer: MEDICARE

## 2023-11-01 ENCOUNTER — NON-APPOINTMENT (OUTPATIENT)
Age: 88
End: 2023-11-01

## 2023-11-01 VITALS
RESPIRATION RATE: 12 BRPM | OXYGEN SATURATION: 95 % | HEIGHT: 66 IN | DIASTOLIC BLOOD PRESSURE: 78 MMHG | BODY MASS INDEX: 29.09 KG/M2 | TEMPERATURE: 97.1 F | HEART RATE: 79 BPM | WEIGHT: 181 LBS | SYSTOLIC BLOOD PRESSURE: 145 MMHG

## 2023-11-01 PROCEDURE — 93000 ELECTROCARDIOGRAM COMPLETE: CPT

## 2023-11-01 PROCEDURE — 99215 OFFICE O/P EST HI 40 MIN: CPT

## 2023-11-14 ENCOUNTER — RX RENEWAL (OUTPATIENT)
Age: 88
End: 2023-11-14

## 2023-11-16 ENCOUNTER — TRANSCRIPTION ENCOUNTER (OUTPATIENT)
Age: 88
End: 2023-11-16

## 2023-11-16 ENCOUNTER — INPATIENT (INPATIENT)
Facility: HOSPITAL | Age: 88
LOS: 0 days | Discharge: HOME | DRG: 286 | End: 2023-11-16
Attending: STUDENT IN AN ORGANIZED HEALTH CARE EDUCATION/TRAINING PROGRAM | Admitting: INTERNAL MEDICINE
Payer: COMMERCIAL

## 2023-11-16 VITALS
DIASTOLIC BLOOD PRESSURE: 78 MMHG | OXYGEN SATURATION: 99 % | RESPIRATION RATE: 17 BRPM | HEART RATE: 67 BPM | SYSTOLIC BLOOD PRESSURE: 159 MMHG

## 2023-11-16 VITALS — HEIGHT: 66 IN | WEIGHT: 181 LBS

## 2023-11-16 DIAGNOSIS — J90 PLEURAL EFFUSION, NOT ELSEWHERE CLASSIFIED: ICD-10-CM

## 2023-11-16 DIAGNOSIS — I25.10 ATHEROSCLEROTIC HEART DISEASE OF NATIVE CORONARY ARTERY WITHOUT ANGINA PECTORIS: ICD-10-CM

## 2023-11-16 DIAGNOSIS — I31.39 OTHER PERICARDIAL EFFUSION (NONINFLAMMATORY): ICD-10-CM

## 2023-11-16 DIAGNOSIS — Z95.5 PRESENCE OF CORONARY ANGIOPLASTY IMPLANT AND GRAFT: Chronic | ICD-10-CM

## 2023-11-16 DIAGNOSIS — I31.39 OTHER PERICARDIAL EFFUSION (NONINFLAMMATORY): Chronic | ICD-10-CM

## 2023-11-16 DIAGNOSIS — R07.89 OTHER CHEST PAIN: ICD-10-CM

## 2023-11-16 DIAGNOSIS — J45.909 UNSPECIFIED ASTHMA, UNCOMPLICATED: ICD-10-CM

## 2023-11-16 DIAGNOSIS — Z29.9 ENCOUNTER FOR PROPHYLACTIC MEASURES, UNSPECIFIED: ICD-10-CM

## 2023-11-16 DIAGNOSIS — N18.6 END STAGE RENAL DISEASE: ICD-10-CM

## 2023-11-16 DIAGNOSIS — E03.9 HYPOTHYROIDISM, UNSPECIFIED: ICD-10-CM

## 2023-11-16 LAB
ALBUMIN SERPL ELPH-MCNC: 4.6 G/DL — SIGNIFICANT CHANGE UP (ref 3.3–5)
ALBUMIN SERPL ELPH-MCNC: 4.6 G/DL — SIGNIFICANT CHANGE UP (ref 3.3–5)
ALP SERPL-CCNC: 106 U/L — SIGNIFICANT CHANGE UP (ref 40–120)
ALP SERPL-CCNC: 106 U/L — SIGNIFICANT CHANGE UP (ref 40–120)
ALT FLD-CCNC: 16 U/L — SIGNIFICANT CHANGE UP (ref 10–45)
ALT FLD-CCNC: 16 U/L — SIGNIFICANT CHANGE UP (ref 10–45)
ANION GAP SERPL CALC-SCNC: 14 MMOL/L — SIGNIFICANT CHANGE UP (ref 5–17)
ANION GAP SERPL CALC-SCNC: 14 MMOL/L — SIGNIFICANT CHANGE UP (ref 5–17)
APTT BLD: 32 SEC — SIGNIFICANT CHANGE UP (ref 24.5–35.6)
APTT BLD: 32 SEC — SIGNIFICANT CHANGE UP (ref 24.5–35.6)
AST SERPL-CCNC: 21 U/L — SIGNIFICANT CHANGE UP (ref 10–40)
AST SERPL-CCNC: 21 U/L — SIGNIFICANT CHANGE UP (ref 10–40)
BASOPHILS # BLD AUTO: 0.06 K/UL — SIGNIFICANT CHANGE UP (ref 0–0.2)
BASOPHILS # BLD AUTO: 0.06 K/UL — SIGNIFICANT CHANGE UP (ref 0–0.2)
BASOPHILS NFR BLD AUTO: 0.9 % — SIGNIFICANT CHANGE UP (ref 0–2)
BASOPHILS NFR BLD AUTO: 0.9 % — SIGNIFICANT CHANGE UP (ref 0–2)
BILIRUB DIRECT SERPL-MCNC: <0.1 MG/DL — SIGNIFICANT CHANGE UP (ref 0–0.3)
BILIRUB DIRECT SERPL-MCNC: <0.1 MG/DL — SIGNIFICANT CHANGE UP (ref 0–0.3)
BILIRUB INDIRECT FLD-MCNC: >0.2 MG/DL — SIGNIFICANT CHANGE UP (ref 0.2–1)
BILIRUB INDIRECT FLD-MCNC: >0.2 MG/DL — SIGNIFICANT CHANGE UP (ref 0.2–1)
BILIRUB SERPL-MCNC: 0.3 MG/DL — SIGNIFICANT CHANGE UP (ref 0.2–1.2)
BILIRUB SERPL-MCNC: 0.3 MG/DL — SIGNIFICANT CHANGE UP (ref 0.2–1.2)
BUN SERPL-MCNC: 33 MG/DL — HIGH (ref 7–23)
BUN SERPL-MCNC: 33 MG/DL — HIGH (ref 7–23)
CALCIUM SERPL-MCNC: 8.8 MG/DL — SIGNIFICANT CHANGE UP (ref 8.4–10.5)
CALCIUM SERPL-MCNC: 8.8 MG/DL — SIGNIFICANT CHANGE UP (ref 8.4–10.5)
CHLORIDE SERPL-SCNC: 98 MMOL/L — SIGNIFICANT CHANGE UP (ref 96–108)
CHLORIDE SERPL-SCNC: 98 MMOL/L — SIGNIFICANT CHANGE UP (ref 96–108)
CO2 SERPL-SCNC: 30 MMOL/L — SIGNIFICANT CHANGE UP (ref 22–31)
CO2 SERPL-SCNC: 30 MMOL/L — SIGNIFICANT CHANGE UP (ref 22–31)
CREAT SERPL-MCNC: 5.41 MG/DL — HIGH (ref 0.5–1.3)
CREAT SERPL-MCNC: 5.41 MG/DL — HIGH (ref 0.5–1.3)
EGFR: 10 ML/MIN/1.73M2 — LOW
EGFR: 10 ML/MIN/1.73M2 — LOW
EOSINOPHIL # BLD AUTO: 0.44 K/UL — SIGNIFICANT CHANGE UP (ref 0–0.5)
EOSINOPHIL # BLD AUTO: 0.44 K/UL — SIGNIFICANT CHANGE UP (ref 0–0.5)
EOSINOPHIL NFR BLD AUTO: 6.7 % — HIGH (ref 0–6)
EOSINOPHIL NFR BLD AUTO: 6.7 % — HIGH (ref 0–6)
GLUCOSE SERPL-MCNC: 112 MG/DL — HIGH (ref 70–99)
GLUCOSE SERPL-MCNC: 112 MG/DL — HIGH (ref 70–99)
HCT VFR BLD CALC: 34.5 % — LOW (ref 39–50)
HCT VFR BLD CALC: 34.5 % — LOW (ref 39–50)
HGB BLD-MCNC: 10.9 G/DL — LOW (ref 13–17)
HGB BLD-MCNC: 10.9 G/DL — LOW (ref 13–17)
IMM GRANULOCYTES NFR BLD AUTO: 0.5 % — SIGNIFICANT CHANGE UP (ref 0–0.9)
IMM GRANULOCYTES NFR BLD AUTO: 0.5 % — SIGNIFICANT CHANGE UP (ref 0–0.9)
INR BLD: 1.17 RATIO — SIGNIFICANT CHANGE UP (ref 0.85–1.18)
INR BLD: 1.17 RATIO — SIGNIFICANT CHANGE UP (ref 0.85–1.18)
LYMPHOCYTES # BLD AUTO: 0.98 K/UL — LOW (ref 1–3.3)
LYMPHOCYTES # BLD AUTO: 0.98 K/UL — LOW (ref 1–3.3)
LYMPHOCYTES # BLD AUTO: 14.9 % — SIGNIFICANT CHANGE UP (ref 13–44)
LYMPHOCYTES # BLD AUTO: 14.9 % — SIGNIFICANT CHANGE UP (ref 13–44)
MAGNESIUM SERPL-MCNC: 2.2 MG/DL — SIGNIFICANT CHANGE UP (ref 1.6–2.6)
MAGNESIUM SERPL-MCNC: 2.2 MG/DL — SIGNIFICANT CHANGE UP (ref 1.6–2.6)
MCHC RBC-ENTMCNC: 31.6 GM/DL — LOW (ref 32–36)
MCHC RBC-ENTMCNC: 31.6 GM/DL — LOW (ref 32–36)
MCHC RBC-ENTMCNC: 32.1 PG — SIGNIFICANT CHANGE UP (ref 27–34)
MCHC RBC-ENTMCNC: 32.1 PG — SIGNIFICANT CHANGE UP (ref 27–34)
MCV RBC AUTO: 101.5 FL — HIGH (ref 80–100)
MCV RBC AUTO: 101.5 FL — HIGH (ref 80–100)
MONOCYTES # BLD AUTO: 1.04 K/UL — HIGH (ref 0–0.9)
MONOCYTES # BLD AUTO: 1.04 K/UL — HIGH (ref 0–0.9)
MONOCYTES NFR BLD AUTO: 15.8 % — HIGH (ref 2–14)
MONOCYTES NFR BLD AUTO: 15.8 % — HIGH (ref 2–14)
NEUTROPHILS # BLD AUTO: 4.03 K/UL — SIGNIFICANT CHANGE UP (ref 1.8–7.4)
NEUTROPHILS # BLD AUTO: 4.03 K/UL — SIGNIFICANT CHANGE UP (ref 1.8–7.4)
NEUTROPHILS NFR BLD AUTO: 61.2 % — SIGNIFICANT CHANGE UP (ref 43–77)
NEUTROPHILS NFR BLD AUTO: 61.2 % — SIGNIFICANT CHANGE UP (ref 43–77)
NRBC # BLD: 0 /100 WBCS — SIGNIFICANT CHANGE UP (ref 0–0)
NRBC # BLD: 0 /100 WBCS — SIGNIFICANT CHANGE UP (ref 0–0)
PHOSPHATE SERPL-MCNC: 4.6 MG/DL — HIGH (ref 2.5–4.5)
PHOSPHATE SERPL-MCNC: 4.6 MG/DL — HIGH (ref 2.5–4.5)
PLATELET # BLD AUTO: 277 K/UL — SIGNIFICANT CHANGE UP (ref 150–400)
PLATELET # BLD AUTO: 277 K/UL — SIGNIFICANT CHANGE UP (ref 150–400)
POTASSIUM SERPL-MCNC: 4.8 MMOL/L — SIGNIFICANT CHANGE UP (ref 3.5–5.3)
POTASSIUM SERPL-MCNC: 4.8 MMOL/L — SIGNIFICANT CHANGE UP (ref 3.5–5.3)
POTASSIUM SERPL-SCNC: 4.8 MMOL/L — SIGNIFICANT CHANGE UP (ref 3.5–5.3)
POTASSIUM SERPL-SCNC: 4.8 MMOL/L — SIGNIFICANT CHANGE UP (ref 3.5–5.3)
PROT SERPL-MCNC: 7.3 G/DL — SIGNIFICANT CHANGE UP (ref 6–8.3)
PROT SERPL-MCNC: 7.3 G/DL — SIGNIFICANT CHANGE UP (ref 6–8.3)
PROTHROM AB SERPL-ACNC: 12.2 SEC — SIGNIFICANT CHANGE UP (ref 9.5–13)
PROTHROM AB SERPL-ACNC: 12.2 SEC — SIGNIFICANT CHANGE UP (ref 9.5–13)
RBC # BLD: 3.4 M/UL — LOW (ref 4.2–5.8)
RBC # BLD: 3.4 M/UL — LOW (ref 4.2–5.8)
RBC # FLD: 14.9 % — HIGH (ref 10.3–14.5)
RBC # FLD: 14.9 % — HIGH (ref 10.3–14.5)
SODIUM SERPL-SCNC: 142 MMOL/L — SIGNIFICANT CHANGE UP (ref 135–145)
SODIUM SERPL-SCNC: 142 MMOL/L — SIGNIFICANT CHANGE UP (ref 135–145)
WBC # BLD: 6.53 K/UL — SIGNIFICANT CHANGE UP (ref 3.8–10.5)
WBC # BLD: 6.53 K/UL — SIGNIFICANT CHANGE UP (ref 3.8–10.5)
WBC # FLD AUTO: 6.53 K/UL — SIGNIFICANT CHANGE UP (ref 3.8–10.5)
WBC # FLD AUTO: 6.53 K/UL — SIGNIFICANT CHANGE UP (ref 3.8–10.5)

## 2023-11-16 PROCEDURE — 71250 CT THORAX DX C-: CPT | Mod: 26

## 2023-11-16 PROCEDURE — 85610 PROTHROMBIN TIME: CPT

## 2023-11-16 PROCEDURE — 71250 CT THORAX DX C-: CPT | Mod: MA

## 2023-11-16 PROCEDURE — C1887: CPT

## 2023-11-16 PROCEDURE — 85027 COMPLETE CBC AUTOMATED: CPT

## 2023-11-16 PROCEDURE — 85730 THROMBOPLASTIN TIME PARTIAL: CPT

## 2023-11-16 PROCEDURE — C1769: CPT

## 2023-11-16 PROCEDURE — C1894: CPT

## 2023-11-16 PROCEDURE — 84100 ASSAY OF PHOSPHORUS: CPT

## 2023-11-16 PROCEDURE — 83735 ASSAY OF MAGNESIUM: CPT

## 2023-11-16 PROCEDURE — 93321 DOPPLER ECHO F-UP/LMTD STD: CPT | Mod: 26

## 2023-11-16 PROCEDURE — 80076 HEPATIC FUNCTION PANEL: CPT

## 2023-11-16 PROCEDURE — 99223 1ST HOSP IP/OBS HIGH 75: CPT | Mod: GC

## 2023-11-16 PROCEDURE — 93458 L HRT ARTERY/VENTRICLE ANGIO: CPT | Mod: 26

## 2023-11-16 PROCEDURE — 93010 ELECTROCARDIOGRAM REPORT: CPT

## 2023-11-16 PROCEDURE — 93308 TTE F-UP OR LMTD: CPT

## 2023-11-16 PROCEDURE — 93005 ELECTROCARDIOGRAM TRACING: CPT

## 2023-11-16 PROCEDURE — 93308 TTE F-UP OR LMTD: CPT | Mod: 26

## 2023-11-16 PROCEDURE — 93452 LEFT HRT CATH W/VENTRCLGRPHY: CPT

## 2023-11-16 PROCEDURE — 99152 MOD SED SAME PHYS/QHP 5/>YRS: CPT

## 2023-11-16 PROCEDURE — 80048 BASIC METABOLIC PNL TOTAL CA: CPT

## 2023-11-16 PROCEDURE — 93321 DOPPLER ECHO F-UP/LMTD STD: CPT

## 2023-11-16 PROCEDURE — 93458 L HRT ARTERY/VENTRICLE ANGIO: CPT

## 2023-11-16 RX ORDER — ALBUTEROL 90 UG/1
2 AEROSOL, METERED ORAL EVERY 6 HOURS
Refills: 0 | Status: DISCONTINUED | OUTPATIENT
Start: 2023-11-16 | End: 2023-11-16

## 2023-11-16 RX ORDER — IBANDRONATE SODIUM 150 MG/1
1 TABLET ORAL
Refills: 0 | DISCHARGE

## 2023-11-16 RX ORDER — METOPROLOL TARTRATE 50 MG
25 TABLET ORAL DAILY
Refills: 0 | Status: DISCONTINUED | OUTPATIENT
Start: 2023-11-16 | End: 2023-11-16

## 2023-11-16 RX ORDER — OMEPRAZOLE 10 MG/1
1 CAPSULE, DELAYED RELEASE ORAL
Refills: 0 | DISCHARGE

## 2023-11-16 RX ORDER — SENNA PLUS 8.6 MG/1
2 TABLET ORAL AT BEDTIME
Refills: 0 | Status: DISCONTINUED | OUTPATIENT
Start: 2023-11-16 | End: 2023-11-16

## 2023-11-16 RX ORDER — ASPIRIN/CALCIUM CARB/MAGNESIUM 324 MG
81 TABLET ORAL DAILY
Refills: 0 | Status: DISCONTINUED | OUTPATIENT
Start: 2023-11-16 | End: 2023-11-16

## 2023-11-16 RX ORDER — ATORVASTATIN CALCIUM 80 MG/1
20 TABLET, FILM COATED ORAL AT BEDTIME
Refills: 0 | Status: DISCONTINUED | OUTPATIENT
Start: 2023-11-16 | End: 2023-11-16

## 2023-11-16 RX ORDER — TAMSULOSIN HYDROCHLORIDE 0.4 MG/1
0.4 CAPSULE ORAL AT BEDTIME
Refills: 0 | Status: DISCONTINUED | OUTPATIENT
Start: 2023-11-16 | End: 2023-11-16

## 2023-11-16 RX ORDER — SEVELAMER CARBONATE 2400 MG/1
800 POWDER, FOR SUSPENSION ORAL THREE TIMES A DAY
Refills: 0 | Status: DISCONTINUED | OUTPATIENT
Start: 2023-11-16 | End: 2023-11-16

## 2023-11-16 RX ORDER — ALBUTEROL 90 UG/1
2 AEROSOL, METERED ORAL
Qty: 0 | Refills: 0 | DISCHARGE
Start: 2023-11-16

## 2023-11-16 RX ORDER — CLOPIDOGREL BISULFATE 75 MG/1
75 TABLET, FILM COATED ORAL DAILY
Refills: 0 | Status: DISCONTINUED | OUTPATIENT
Start: 2023-11-16 | End: 2023-11-16

## 2023-11-16 RX ORDER — CALCIUM ACETATE 667 MG
1334 TABLET ORAL
Refills: 0 | Status: DISCONTINUED | OUTPATIENT
Start: 2023-11-16 | End: 2023-11-16

## 2023-11-16 RX ORDER — SODIUM CHLORIDE 9 MG/ML
3 INJECTION INTRAMUSCULAR; INTRAVENOUS; SUBCUTANEOUS EVERY 8 HOURS
Refills: 0 | Status: DISCONTINUED | OUTPATIENT
Start: 2023-11-16 | End: 2023-11-16

## 2023-11-16 RX ORDER — LEVOTHYROXINE SODIUM 125 MCG
137 TABLET ORAL DAILY
Refills: 0 | Status: DISCONTINUED | OUTPATIENT
Start: 2023-11-17 | End: 2023-11-16

## 2023-11-16 RX ORDER — LEVOTHYROXINE SODIUM 125 MCG
1 TABLET ORAL
Refills: 0 | DISCHARGE

## 2023-11-16 RX ORDER — DICLOFENAC SODIUM 30 MG/G
1 GEL TOPICAL
Refills: 0 | DISCHARGE

## 2023-11-16 NOTE — PROGRESS NOTE ADULT - ATTENDING COMMENTS
Patient is an 87 yeae old [place]-speaking male, with extensive past cardiac history, presenting with new pericardial effusion of unknown etiology with IR following for potential pericardial window    - s/p LHC with no new or actionable lesions; noted to have large pericardial effusion   - IR consulted for drainage; obtain ECHO and CT abd prior   - currently HDS; continue to monitor closely   - nephro consult for HD needs while inpatient   - cardio recs appreciated   - PT eval pending    Rest as above. Discussed with HS. Patient is an 87 yeae old [place]ian-speaking male, with extensive past cardiac history, presenting with new pericardial effusion of unknown etiology with IR following for potential pericardial window    - s/p LHC with no new or actionable lesions; noted to have large pericardial effusion   - IR consulted for drainage; obtain ECHO and CT abd prior   - no clinical symptoms of tamponade; currently HDS; continue to monitor closely   - nephro consult for HD needs while inpatient   - cardio recs appreciated     IR chart note reviewed; per discussion between IR and cardio, will plan for drainage to be done in 5 days after holding plavix. Will likely be readmitted for monitoring post procedure.  - plan for DC patient home; hold plavix   - 40 mins spent on DC planning     Rest as above. Discussed with HS.

## 2023-11-16 NOTE — DISCHARGE NOTE PROVIDER - NSDCCPTREATMENT_GEN_ALL_CORE_FT
PRINCIPAL PROCEDURE  Procedure: Transthoracic echocardiography (TTE)  Findings and Treatment: (11/16/2023)  Pericardium:  There is a large pericardial effusion noted adjacent to the posterior left ventricle, a large pericardial effusion noted adjacent to the right atrium, a small pericardial effusion noted adjacent to the anterior right ventricle, a moderate pericardial effusion noted adjacent to the right ventricle and a large pericardial effusion noted adjacent to the apex.       PRINCIPAL PROCEDURE  Procedure: Transthoracic echocardiography (TTE)  Findings and Treatment: (11/16/2023)  Pericardium:  There is a large pericardial effusion noted adjacent to the posterior left ventricle, a large pericardial effusion noted adjacent to the right atrium, a small pericardial effusion noted adjacent to the anterior right ventricle, a moderate pericardial effusion noted adjacent to the right ventricle and a large pericardial effusion noted adjacent to the apex.        SECONDARY PROCEDURE  Procedure: CT chest wo con  Findings and Treatment: PROCEDURE:  CT scan of the chest was obtained without intravenous contrast.  FINDINGS:  LYMPH NODES: No lymphadenopathy.  HEART/VASCULATURE: The heart is normal in size. Moderate to large   pericardial effusion. There is no flattening of the free wall the right   ventricle.  Atherosclerotic calcification of the aorta and coronary arteries. Status   post coronary stents. Mitral annular calcifications. Also aortic arch and   left-sided descending thoracic aorta. The aorta is tortuous.  AIRWAYS/LUNGS/PLEURA: Trace debris in the central airways. Mild   centrilobular emphysema. Multiple scattered sub-6 mm pulmonary nodules.   For reference: Right apical 0.4 cm nodule (3-23) and 0.4 cm nodule   (3-27), left lower lobe 0.3 cm nodule (3-67).  No pleural effusion. Calcified pleural plaque along the periphery of the   left thoracic cavity, which may be seen in setting of asbestosis exposure.  UPPER ABDOMEN: Bilateral renal cysts. Subcentimeter hyperattenuating   lesion of the left kidney favored to represent a proteinaceous cyst.   Coarse calcifications of the liver. Subcentimeter hypoattenuating lesions   of the liver too small to characterize.  BONES/SOFT TISSUES: Thyroidectomy. Degenerative changes. Sclerotic focus   of the sternum, likely bone island.  IMPRESSION:  1.  Moderate to large pericardial effusion.

## 2023-11-16 NOTE — DISCHARGE NOTE NURSING/CASE MANAGEMENT/SOCIAL WORK - PATIENT PORTAL LINK FT
You can access the FollowMyHealth Patient Portal offered by [facility] by registering at the following website: [email or web address]. By joining [employer]’s FollowMyHealth portal, you will also be able to view your health information using other applications (apps) compatible with our system.

## 2023-11-16 NOTE — CONSULT NOTE ADULT - SUBJECTIVE AND OBJECTIVE BOX
Dwayne León MD  Cardiology Fellow  [phone]  All Cardiology service information can be found 24/7 on [email or web address], password: [email or web address]    Patient seen and evaluated at bedside    HPI:  88 y/o [place] sor[place] male with PMH of HTN, HLD, hypothyroidism, ESRD on HD, CAD s/pPCI to LAD 8/2023 and dOM not amenable to stenting presenting with typical angina with minimal exertion, no chest discomfort at rest. Also no dizziness. Initially sent to the hospital for elective LHC. LHC without new obstructive lesions. POCUS noted a large posterior pericardial effusion not easily accessible for pericardiocentesis. Patient admitted for further workup.     Initial vitals stable, labs with elevated cratinine and BUN of 33. No current chest pain. TTE and CT pending.     Cardiac Meds: ASA 81, plavix, atorva 20, toprol 25, midodrine 20 on dialysis days    PMHx:   Hypothyroidism    BPH (benign prostatic hyperplasia)    Acute bronchitis    COVID-19    CAD (coronary artery disease)    HLD (hyperlipidemia)    End stage renal failure on dialysis    BPV (benign positional vertigo)    HTN (hypertension)    Pericardial effusion        PSHx:   History of coronary artery stent placement    Pericardial effusion        Allergies:  acetaminophen (Other (Mild))  No Known Allergies      Current Medications:   albuterol    90 MICROgram(s) HFA Inhaler 2 Puff(s) Inhalation every 6 hours PRN  aspirin enteric coated 81 milliGRAM(s) Oral daily  atorvastatin 20 milliGRAM(s) Oral at bedtime  calcium acetate 1334 milliGRAM(s) Oral three times a day with meals  clopidogrel Tablet 75 milliGRAM(s) Oral daily  metoprolol succinate ER 25 milliGRAM(s) Oral daily  senna 2 Tablet(s) Oral at bedtime PRN  sevelamer carbonate 800 milliGRAM(s) Oral three times a day  sodium chloride 0.9% lock flush 3 milliLiter(s) IV Push every 8 hours  tamsulosin 0.4 milliGRAM(s) Oral at bedtime        REVIEW OF SYSTEMS:  CONSTITUTIONAL: No weakness, fevers or chills  EYES/ENT: No visual changes;  No dysphagia  NECK: No pain or stiffness  RESPIRATORY: No cough, wheezing, hemoptysis; No shortness of breath  CARDIOVASCULAR: No chest pain or palpitations; No lower extremity edema  GASTROINTESTINAL: No abdominal or epigastric pain. No nausea, vomiting, or hematemesis; No diarrhea or constipation. No melena or hematochezia.  BACK: No back pain  GENITOURINARY: No dysuria, frequency or hematuria  NEUROLOGICAL: No numbness or weakness  SKIN: No itching, burning, rashes, or lesions   All other review of systems is negative unless indicated above.    Physical Exam:  T(F): 98 (11-16), Max: 98 (11-16)  HR: 62 (11-16) (61 - 68)  BP: 114/64 (11-16) (114/64 - 190/84)  RR: 18 (11-16)  SpO2: 96% (11-16)  GEN: NAD  HEENT: EOMI, clear sclera  PULM: CTA b/l, no wheeze  CV: RRR S1 S2, no murmur, no JVD  ABD: S, NT, ND  EXT: WWP, no edema  PSYCH: normal affect  SKIN: No rash    CXR: Personally reviewed    Labs: Personally reviewed                        10.9   6.53  )-----------( 277      ( 16 Nov 2023 07:16 )             34.5     11-16    142  |  98  |  33<H>  ----------------------------<  112<H>  4.8   |  30  |  5.41<H>    Ca    8.8      16 Nov 2023 07:16                        
Interventional Radiology    Evaluate for Procedure:     HPI: 86 y/o Yi male with PMH of HTN, HLD, CAD s/p diagnostic LHC 6/2023 with severe mLAD disease. Severe distal OM stenosis not amenable to stenting. Patent prior LAD and prox OM stents (Dr. Barboza), 8/2023 PCI/SKYLAR mLAD x 1 (Dr. Wesley), Hypothyroidism , PVD,  LVEF 65-70% on 5/2022 TTE, anemia, pericardial effusion , ESRD on M-W-F (last HD 11/15/23 [facility] with Dr. Sandra Monte, Renal) followed by Dr. Barboza, Cardiologist with his typical anginal complaints and presents for [facility] for further evaluation of his CAD.  Pt denies cp, sob or palpitations. While in cath lab bedside US revealing large posterior pericardial effusion. IR being consulted for possible drainage of pericardial effusion.    Allergies: No Known Allergies    Medications (Abx/Cardiac/Anticoagulation/Blood Products)      Data:  167.6  82.1  T(C): 36.7  HR: 61  BP: 170/70  RR: 16  SpO2: 98%    -WBC 6.53 / HgB 10.9 / Hct 34.5 / Plt 277  -Na 142 / Cl 98 / BUN 33 / Glucose 112  -K 4.8 / CO2 30 / Cr 5.41  -ALT -- / Alk Phos -- / T.Bili --    Radiology:     Assessment/Plan:   86 y/o Yi male with PMH of HTN, HLD, CAD s/p diagnostic C 6/2023 with severe mLAD disease. Severe distal OM stenosis not amenable to stenting. Patent prior LAD and prox OM stents (Dr. Barboza), 8/2023 PCI/SKYLAR mLAD x 1 (Dr. Wesley), Hypothyroidism , PVD,  LVEF 65-70% on 5/2022 TTE, anemia, pericardial effusion , ESRD on M-W-F (last HD 11/15/23 [facility] with Dr. Sandra Monte, Renal) followed by Dr. Barboza, Cardiologist with his typical anginal complaints and presents for C for further evaluation of his CAD.  Pt denies cp, sob or palpitations. While in cath lab bedside US revealing large posterior pericardial effusion. IR being consulted for possible drainage of pericardial effusion.    - case reviewed and discussed with Dr. Huggins  - please obtain non con CT Abd&Pelvis  - please obtain TTE  - reconsult after imaging and echo is obtained  - per Dr. Wesley patient is hemodynamically stable  - d/w primary team

## 2023-11-16 NOTE — PROGRESS NOTE ADULT - PROBLEM SELECTOR PLAN 1
New pericardial effusion seen while getting LHC (11/16)  Otherwise hemodynamically stable - unknown etiology as of yet    Plan  - TTE  - CT abd/pelvis  - Reconsult IR as needed

## 2023-11-16 NOTE — DISCHARGE NOTE PROVIDER - PROVIDER TOKENS
FREE:[LAST:[Nessa],FIRST:[Ricardo],PHONE:[(   )    -],FAX:[(   )    -],SCHEDULEDAPPT:[11/21/2023]] FREE:[LAST:[[place]],FIRST:[Kane],PHONE:[phone]],FAX:[(   )    -],ADDRESS:[[address] [place] [address]],SCHEDULEDAPPT:[11/21/2023],SCHEDULEDAPPTTIME:[11:00 AM]]

## 2023-11-16 NOTE — H&P CARDIOLOGY - RS GEN PE MLT RESP DETAILS PC
scattered rhonchi clears with cough/airway patent/breath sounds equal/good air movement/respirations non-labored/no chest wall tenderness/no intercostal retractions/no rales/no subcutaneous emphysema/no wheezes

## 2023-11-16 NOTE — H&P CARDIOLOGY - BIRTH SEX
Male Oral Minoxidil Pregnancy And Lactation Text: This medication should only be used when clearly needed if you are pregnant, attempting to become pregnant or breast feeding.

## 2023-11-16 NOTE — CHART NOTE - NSCHARTNOTEFT_GEN_A_CORE
Case/imaging discussed in detail with Dr. Haynes.     -Obtain formal complete TTE to r/o tamponade physiology - prior exam is limited to evaluation of pericardial effusion  -Patient vital signs are stable w/ no clinical picture for tamponade  -Can reconsult IR if there is a change in patient's clinical status

## 2023-11-16 NOTE — DISCHARGE NOTE PROVIDER - NSDCCPCAREPLAN_GEN_ALL_CORE_FT
PRINCIPAL DISCHARGE DIAGNOSIS  Diagnosis: Acute pericardial effusion  Assessment and Plan of Treatment: While you were undergoing a left heart cath, you were found to have fluid around your heart. It is unclear at this time where this fluid appeared. While you are otherwise asymptomatic now, significant fluid accumulation can cause cardiac problems. After much discussion with cardiology and interventional radiology, the plan is for you to hold Plavix, return home, and have outpatient followup on Tuesday 11/21 for a procedure to drain the fluid around your heart by interventional radiology.  Please hold plavix in the meantime.   If you suddenly have low blood pressure, feel weak, dizzy, feel heart palpitations, please return to the hospital

## 2023-11-16 NOTE — DISCHARGE NOTE PROVIDER - HOSPITAL COURSE
HPI:  88 y/o Nepali male with PMH of HTN, HLD, CAD s/p diagnostic C 6/2023 with severe mLAD disease. Severe distal OM stenosis not amenable to stenting. Patent prior LAD and prox OM stents (Dr. Barboza), 8/2023 PCI/SKYLAR mLAD x 1 (Dr. 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 Sandra Monte, Renal) followed by Dr. Barboza, Cardiologist with his typical anginal complaints and presents for [facility] for further evaluation of his CAD.  Pt denies CP, SOB, palpitations.     Hospital Course:  While admitted for the [facility], he was found to have a predominantly posterior pericardial effusion, confirmed on TTE. After discussion between cardiology and IR, the patient is planned for a pericardial window outpatient Tuesday, 11/21 after holding Plavix for 5-days via interventional radiology.    TTE Findings (11/16): There is a large pericardial effusion noted adjacent to the posterior left ventricle, a large pericardial effusion noted adjacent to the right atrium, a small pericardial effusion noted adjacent to the anterior right ventricle, a moderate pericardial effusion noted adjacent to the right ventricle and a large pericardial effusion noted adjacent to the apex.    Important Medication Changes and Reason:  - Hold Plavix - for outpatient procedure 11/21 for pericardial window with interventional radiology    Active or Pending Issues Requiring Follow-up:  - Outpatient pericardial window Tuesday, 11/21 with interventional radiology    Advanced Directives:   [X] Full code  [ ] DNR  [ ] Hospice    Discharge Diagnoses:  - Pericardial effusion without tamponade

## 2023-11-16 NOTE — PROGRESS NOTE ADULT - PROBLEM SELECTOR PLAN 2
Hx of CAD s/p diagnostic [facility] 06/2023 w/ severe mLAD disease, severe OM not amendable to stenting, prior LAD + proximal OM stent, PCI/SKYLAR mLAD (08/2023)  Presented initially for concern of worsening CAD with [place], SOB, and CP  [facility] 11/16 showed new pericardial effusion    Plan  - c/w DAPT  - c/w Lipitor  - c/w metoprolol

## 2023-11-16 NOTE — CONSULT NOTE ADULT - ATTENDING COMMENTS
87 year old man with coronary stents, having chest discomfort admitted for elective coronary angiography which revealed no new or actionable lesions, but observed a large pericardial effusion. Patient is no hemodialysis. Plan formal echocardiogram and IR drainage.    To contact call Cardiology Fellow or Attending as listed on [email or web address] password: [email or web address].

## 2023-11-16 NOTE — CONSULT NOTE ADULT - ASSESSMENT
88 y/o Togolese male with PMH of HTN, HLD, hypothyroidism, ESRD on HD, CAD s/pPCI to LAD 8/2023 and OM lesion not amenable to stenting, presenting with typical angina with no new obstructive CAD, but noted to have large pericardial effusion.     presenting with typical angina with minimal exertion, no chest discomfort at rest. Also no dizziness. Initially sent to the hospital for elective LHC. LHC without new obstructive lesions. POCUS noted a large posterior pericardial effusion not easily accessible for pericardiocentesis. Patient admitted for further workup.     Initial vitals stable, labs with elevated cratinine and BUN of 33. No current chest pain. TTE and CT pending.     Cardiac Meds: ASA 81, plavix, atorva 20, toprol 25, midodrine 20 on dialysis days 86 y/o Qatari male with PMH of HTN, HLD, hypothyroidism, ESRD on HD, CAD s/pPCI to LAD 8/2023 and OM lesion not amenable to stenting, presenting with typical angina with no new obstructive CAD, but noted to have large pericardial effusion. Unclear etiology, no significant uremia, ddx includes pericarditis vs malignancy vs idiopathic. Would consult IR for diagnostic drainage. Patient clinically not in tamponade.    Recs:  -c/w ASA/plavix, atorva 20  -c/w toprol 25mg, with midodrine on dialysis days  -f/u TTE and CTa/p  -f/u IR recommendations 88 y/o Polish male with PMH of HTN, HLD, hypothyroidism, ESRD on HD, CAD s/pPCI to LAD 8/2023 and OM lesion not amenable to stenting, presenting with typical angina with no new obstructive CAD, but noted to have large pericardial effusion. Unclear etiology, no significant uremia, ddx includes pericarditis vs malignancy vs idiopathic. Would consult IR for diagnostic drainage. Patient clinically not in tamponade.    Recs:  -c/w ASA/plavix, atorva 20  -c/w toprol 25mg, with midodrine on dialysis days  -f/u TTE and CTa/p  -f/u IR recommendations    Addendum:   -hold plavix, will bring patient back as outpatient for IR guided pericardiocentesis

## 2023-11-16 NOTE — DISCHARGE NOTE PROVIDER - NSDCMRMEDTOKEN_GEN_ALL_CORE_FT
albuterol 90 mcg/inh inhalation aerosol: 2 puff(s) inhaled every 6 hours As needed Shortness of Breath and/or Wheezing  aspirin 81 mg oral delayed release tablet: 1 tablet with sensor orally once a day MDD: 1  DuoNeb 0.5 mg-2.5 mg/3 mL inhalation solution: 1 dose(s) by nebulizer every 4 hours as needed for  shortness of breath and/or wheezing  Lipitor 20 mg oral tablet: 1 tab(s) orally once a day (at bedtime)  Lokelma 10 g oral powder for reconstitution: 10 gram(s) orally Saturday and Sunday  midodrine 10 mg oral tablet: 2 tab(s) orally Monday, Wednesday, and Friday as needed for prior to  dialysis  PhosLo 667 mg oral tablet: 3 tab(s) orally 3 times a day  Renvela 800 mg oral tablet: 1 tab(s) orally 3 times a day  risedronate 35 mg oral tablet: 1 tab(s) orally once a week  senna (sennosides) 8.6 mg oral tablet: 1 tab(s) orally once a day (at bedtime) as needed for  constipation  Synthroid 137 mcg (0.137 mg) oral tablet: 1 tab(s) orally once a day  tamsulosin 0.4 mg oral capsule: 1 cap(s) orally once a day (at bedtime)  Toprol-XL 25 mg oral tablet, extended release: 1 tab(s) orally once a day  Trelegy Ellipta 100 mcg-62.5 mcg-25 mcg/inh inhalation powder: 1 puff(s) inhaled once a day  Zemplar 1 mcg oral capsule: 1 cap(s) orally once a day

## 2023-11-16 NOTE — DISCHARGE NOTE PROVIDER - NSDCFUADDAPPT_GEN_ALL_CORE_FT
You will hear from Ricardo Szymanski (NP) about your outpatient procedure scheduled for 11/21 IR Appointment Tuesday, November 21st at 11AM for pericardial drain placement  Patient should have nothing to eat or drink after midnight prior to procedure.

## 2023-11-16 NOTE — PROGRESS NOTE ADULT - SUBJECTIVE AND OBJECTIVE BOX
PROGRESS NOTE:   Authored by Dr. Jerome Cruz MD (PGY-1)    Patient is a 87y old  Male who presents with a chief complaint of LHC further eval for CAD (16 Nov 2023 10:58)    SUBJECTIVE / OVERNIGHT EVENTS:  88yo Mongolian-speaking Male with PMHx of HTN, HLD, CAD s/p diagnostic C 06/2023 showing severe mLAD disease with severe OM not amendable to stenting, prior LAD + proximal OM stent, PCI/SKYLAR of mLAD x1 (Dr. Wesley) - 08/2023, PVD, LVEF 65-70%, ESRD - on HD diagnosed 6-years ago MWF schedule via AVF left elbow w/ last session 11/15/2023 at [facility] and follows with Dr. Sandra Monte, and anemia presenting to the hospital for evaluation of worsening CAD w/ CP, SOB, exertional dyspnea. He underwent a LHC and was found to have a pericardial effusion. IR was consulted who recommended TTE and non-contrast CT Abd/Pelvis for potential pericardial     MEDICATIONS  (STANDING):  aspirin enteric coated 81 milliGRAM(s) Oral daily  atorvastatin 20 milliGRAM(s) Oral at bedtime  calcium acetate 1334 milliGRAM(s) Oral three times a day with meals  clopidogrel Tablet 75 milliGRAM(s) Oral daily  metoprolol succinate ER 25 milliGRAM(s) Oral daily  sevelamer carbonate 800 milliGRAM(s) Oral three times a day  sodium chloride 0.9% lock flush 3 milliLiter(s) IV Push every 8 hours  tamsulosin 0.4 milliGRAM(s) Oral at bedtime    MEDICATIONS  (PRN):  albuterol    90 MICROgram(s) HFA Inhaler 2 Puff(s) Inhalation every 6 hours PRN Shortness of Breath and/or Wheezing  senna 2 Tablet(s) Oral at bedtime PRN Constipation   CAPILLARY BLOOD GLUCOSE     I&O's Summary   PHYSICAL EXAM:  Vital Signs Last 24 Hrs  T(C): 36.7 (16 Nov 2023 08:10), Max: 36.7 (16 Nov 2023 07:22)  T(F): 98 (16 Nov 2023 08:10), Max: 98 (16 Nov 2023 07:22)  HR: 61 (16 Nov 2023 10:55) (61 - 68)  BP: 148/67 (16 Nov 2023 10:55) (114/64 - 190/84)  BP(mean): --  RR: 16 (16 Nov 2023 10:55) (15 - 18)  SpO2: 96% (16 Nov 2023 10:55) (96% - 99%)    Parameters below as of 16 Nov 2023 11:55  Patient On (Oxygen Delivery Method): room air    CONSTITUTIONAL: NAD  HEET: MMM, EOMI, PERRLA  RESPIRATORY: Decreased aeration no increased WOB  CARDIOVASCULAR: Distant heart sounds, no significant pedal edema  ABDOMEN: Distended, but soft and nontender  MUSCULOSKELETAL: AVF on left arm  NEURO: Moving all four extremities, sensation grossly intact  PSYCH: A0x3  SKIN: Significant Seborrheic keratoses on forehead  LABS:                        10.9   6.53  )-----------( 277      ( 16 Nov 2023 07:16 )             34.5   11-16    142  |  98  |  33<H>  ----------------------------<  112<H>  4.8   |  30  |  5.41<H>    Ca    8.8      16 Nov 2023 07:16  Phos  4.6     11-16  Mg     2.2     11-16    TPro  7.3  /  Alb  4.6  /  TBili  0.3  /  DBili  <0.1  /  AST  21  /  ALT  16  /  AlkPhos  106  11-16    Urinalysis Basic - ( 16 Nov 2023 07:16 )    Color: x / Appearance: x / SG: x / pH: x  Gluc: 112 mg/dL / Ketone: x  / Bili: x / Urobili: x   Blood: x / Protein: x / Nitrite: x   Leuk Esterase: x / RBC: x / WBC x   Sq Epi: x / Non Sq Epi: x / Bacteria: x      MICROBIOLOGY:    PROGRESS NOTE:   Authored by Dr. Jerome Cruz MD (PGY-1)    Patient is a 87y old  Male who presents with a chief complaint of LHC further eval for CAD (16 Nov 2023 10:58)    SUBJECTIVE / OVERNIGHT EVENTS:  86yo Welsh-speaking Male with PMHx of HTN, HLD, CAD s/p diagnostic C 06/2023 showing severe mLAD disease with severe OM not amendable to stenting, prior LAD + proximal OM stent, PCI/SKYLAR of mLAD x1 (Dr. Wesley) - 08/2023, PVD, LVEF 65-70%, ESRD - on HD diagnosed 6-years ago MWF schedule via AVF left elbow w/ last session 11/15/2023 at [facility] and follows with Dr. Sandra Monte, and anemia presenting to the hospital for evaluation of worsening CAD w/ CP, SOB, exertional dyspnea. He underwent a LHC and was found to have a pericardial effusion. IR was consulted who recommended TTE and non-contrast CT Abd/Pelvis for potential pericardial.     Spoke with the patient at bedside with the use of a . At bedside, the patient denied fevers, chills, N/V, SOB, CP, [place]    MEDICATIONS  (STANDING):  aspirin enteric coated 81 milliGRAM(s) Oral daily  atorvastatin 20 milliGRAM(s) Oral at bedtime  calcium acetate 1334 milliGRAM(s) Oral three times a day with meals  clopidogrel Tablet 75 milliGRAM(s) Oral daily  metoprolol succinate ER 25 milliGRAM(s) Oral daily  sevelamer carbonate 800 milliGRAM(s) Oral three times a day  sodium chloride 0.9% lock flush 3 milliLiter(s) IV Push every 8 hours  tamsulosin 0.4 milliGRAM(s) Oral at bedtime    MEDICATIONS  (PRN):  albuterol    90 MICROgram(s) HFA Inhaler 2 Puff(s) Inhalation every 6 hours PRN Shortness of Breath and/or Wheezing  senna 2 Tablet(s) Oral at bedtime PRN Constipation   CAPILLARY BLOOD GLUCOSE    ALLERGIES:  Endorsed NKDA     I&O's Summary   PHYSICAL EXAM:  Vital Signs Last 24 Hrs  T(C): 36.7 (16 Nov 2023 08:10), Max: 36.7 (16 Nov 2023 07:22)  T(F): 98 (16 Nov 2023 08:10), Max: 98 (16 Nov 2023 07:22)  HR: 61 (16 Nov 2023 10:55) (61 - 68)  BP: 148/67 (16 Nov 2023 10:55) (114/64 - 190/84)  BP(mean): --  RR: 16 (16 Nov 2023 10:55) (15 - 18)  SpO2: 96% (16 Nov 2023 10:55) (96% - 99%)    Parameters below as of 16 Nov 2023 11:55  Patient On (Oxygen Delivery Method): room air    CONSTITUTIONAL: NAD  HEET: MMM, EOMI, PERRLA  RESPIRATORY: Decreased aeration no increased WOB  CARDIOVASCULAR: Distant heart sounds, no significant pedal edema  ABDOMEN: Distended, but soft and nontender  MUSCULOSKELETAL: AVF on left arm  NEURO: Moving all four extremities, sensation grossly intact  PSYCH: A0x3  SKIN: Significant Seborrheic keratoses on forehead  LABS:                        10.9   6.53  )-----------( 277      ( 16 Nov 2023 07:16 )             34.5   11-16    142  |  98  |  33<H>  ----------------------------<  112<H>  4.8   |  30  |  5.41<H>    Ca    8.8      16 Nov 2023 07:16  Phos  4.6     11-16  Mg     2.2     11-16    TPro  7.3  /  Alb  4.6  /  TBili  0.3  /  DBili  <0.1  /  AST  21  /  ALT  16  /  AlkPhos  106  11-16    Urinalysis Basic - ( 16 Nov 2023 07:16 )    Color: x / Appearance: x / SG: x / pH: x  Gluc: 112 mg/dL / Ketone: x  / Bili: x / Urobili: x   Blood: x / Protein: x / Nitrite: x   Leuk Esterase: x / RBC: x / WBC x   Sq Epi: x / Non Sq Epi: x / Bacteria: x      MICROBIOLOGY:

## 2023-11-16 NOTE — H&P CARDIOLOGY - HISTORY OF PRESENT ILLNESS
88 y/o Persian male with PMH of HTN, HLD, CAD s/p diagnostic LHC 6/2023 with severe mLAD disease. Severe distal OM stenosis not amenable to  stenting. Patent prior LAD and prox OM stents (Dr. Barboza), 8/2023 PCI/SKYLAR mLAD x 1 (Dr. 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 Barboza, Cardiologist with his typical anginal complaints and presents for [facility] for further evaluation of his CAD.   86 y/o Kinyarwanda male with PMH of HTN, HLD, CAD s/p diagnostic LHC 6/2023 with severe mLAD disease. Severe distal OM stenosis not amenable to  stenting. Patent prior LAD and prox OM stents (Dr. Barboza), 8/2023 PCI/SKYLAR mLAD x 1 (Dr. 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 Barboza, Cardiologist with his typical anginal complaints and presents for [facility] for further evaluation of his CAD.  Pt denies cp, sob or palpitations. 86 y/o Irish male with PMH of HTN, HLD, CAD s/p diagnostic LHC 6/2023 with severe mLAD disease. Severe distal OM stenosis not amenable to stenting. Patent prior LAD and prox OM stents (Dr. Barboza), 8/2023 PCI/SKYLAR mLAD x 1 (Dr. 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 Barboza, Cardiologist with his typical anginal complaints and presents for [facility] for further evaluation of his CAD.  Pt denies cp, sob or palpitations. 86 y/o Kinyarwanda male with PMH of HTN, HLD, CAD s/p diagnostic C 6/2023 with severe mLAD disease. Severe distal OM stenosis not amenable to stenting. Patent prior LAD and prox OM stents (Dr. Barboza), 8/2023 PCI/SKYLAR mLAD x 1 (Dr. 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 Sandra Monte, Renal) followed by Dr. Barboza, Cardiologist with his typical anginal complaints and presents for [facility] for further evaluation of his CAD.  Pt denies cp, sob or palpitations.

## 2023-11-16 NOTE — CHART NOTE - NSCHARTNOTEFT_GEN_A_CORE
Dr. Haynes and Dr. Man from [place] discussed the pericardial drainage further.    -Patient to have pericardial drain as outpatient after holding plavix for 5 days (last dose yesterday 11/15). Will remain on aspirin - unable to hold per cards d/t recent PCI to LAD in 08/23  -At that point patient will require admission to for monitoring.   -Primary team to send INR prior to patient discharge today.  -Will schedule outpatient appointment for patient Tuesday 11/21. Dr. Haynes and Dr. Man from cards discussed the pericardial drainage further.    -Patient to have pericardial drain as outpatient after holding plavix for 5 days (last dose yesterday 11/15). Will remain on aspirin - unable to hold per cards d/t recent PCI to LAD in 08/23  -At that point patient will require admission to for monitoring.   -Primary team to send INR prior to patient discharge today.  -Outpatient appointment scheduled for patient Tuesday 11/21 11AM in IR for pericardial drain placement.  -Patient will need to be NPO midnight prior to procedure.

## 2023-11-16 NOTE — PROGRESS NOTE ADULT - ASSESSMENT
88yo Bengali-speaking Male with extensive past cardiac history presenting with new pericardial effusion of unknown etiology with IR following for potential pericardial window

## 2023-11-16 NOTE — H&P CARDIOLOGY - NSICDXPASTSURGICALHX_GEN_ALL_CORE_FT
PAST SURGICAL HISTORY:  History of coronary artery stent placement 5 yrs ago    Pericardial effusion drained

## 2023-11-16 NOTE — DISCHARGE NOTE PROVIDER - CARE PROVIDER_API CALL
Ricardo Szymanski  Phone: (   )    -  Fax: (   )    -  Scheduled Appointment: 11/21/2023   Kane Haynes  [address] [place] [id number]  Phone: [phone]  Fax: (   )    -  Scheduled Appointment: 11/21/2023 11:00 AM

## 2023-11-16 NOTE — PROGRESS NOTE ADULT - PROBLEM SELECTOR PLAN 3
PROCEDURE NOTE: Laser for Non RD OS. Diagnosis: Atrophic Retinal Hole.
Diagnosed 6-years ago likely 2/2 cardiac etiology.   MWF schedule via L AVF, last session 11/15. Sessions performed at [facility]  Nephrologist: Dr. Sandra Roman  - Consulted Nephrology   - c/w Phoslo, risedronate, zemplar, renvela, ibandronate, lokelma  - c/w midodrine 10mg MWF for dialysis as needed

## 2023-11-20 NOTE — PROGRESS NOTE ADULT - PROBLEM/PLAN-2
Received potential referral for ARU. Will review patients clinicals and PT/OT notes. CM to speak with patient. Patients primary insurance is [employer], pre-cert needed if appropriate/agreeable.
DISPLAY PLAN FREE TEXT
DISPLAY PLAN FREE TEXT

## 2023-11-21 ENCOUNTER — INPATIENT (INPATIENT)
Facility: HOSPITAL | Age: 88
LOS: 9 days | Discharge: HOME | DRG: 314 | End: 2023-12-01
Attending: STUDENT IN AN ORGANIZED HEALTH CARE EDUCATION/TRAINING PROGRAM | Admitting: STUDENT IN AN ORGANIZED HEALTH CARE EDUCATION/TRAINING PROGRAM
Payer: MEDICARE

## 2023-11-21 VITALS
WEIGHT: 180.78 LBS | HEIGHT: 65 IN | RESPIRATION RATE: 16 BRPM | SYSTOLIC BLOOD PRESSURE: 181 MMHG | DIASTOLIC BLOOD PRESSURE: 78 MMHG | TEMPERATURE: 98 F | HEART RATE: 73 BPM | OXYGEN SATURATION: 97 %

## 2023-11-21 DIAGNOSIS — J45.20 MILD INTERMITTENT ASTHMA, UNCOMPLICATED: ICD-10-CM

## 2023-11-21 DIAGNOSIS — E87.5 HYPERKALEMIA: ICD-10-CM

## 2023-11-21 DIAGNOSIS — I31.39 OTHER PERICARDIAL EFFUSION (NONINFLAMMATORY): Chronic | ICD-10-CM

## 2023-11-21 DIAGNOSIS — I25.10 ATHEROSCLEROTIC HEART DISEASE OF NATIVE CORONARY ARTERY WITHOUT ANGINA PECTORIS: ICD-10-CM

## 2023-11-21 DIAGNOSIS — I31.39 OTHER PERICARDIAL EFFUSION (NONINFLAMMATORY): ICD-10-CM

## 2023-11-21 DIAGNOSIS — N40.0 BENIGN PROSTATIC HYPERPLASIA WITHOUT LOWER URINARY TRACT SYMPTOMS: ICD-10-CM

## 2023-11-21 DIAGNOSIS — N18.6 END STAGE RENAL DISEASE: ICD-10-CM

## 2023-11-21 DIAGNOSIS — Z95.5 PRESENCE OF CORONARY ANGIOPLASTY IMPLANT AND GRAFT: Chronic | ICD-10-CM

## 2023-11-21 DIAGNOSIS — I21.3 ST ELEVATION (STEMI) MYOCARDIAL INFARCTION OF UNSPECIFIED SITE: ICD-10-CM

## 2023-11-21 LAB
ANION GAP SERPL CALC-SCNC: 10 MMOL/L — SIGNIFICANT CHANGE UP (ref 5–17)
ANION GAP SERPL CALC-SCNC: 10 MMOL/L — SIGNIFICANT CHANGE UP (ref 5–17)
ANION GAP SERPL CALC-SCNC: 12 MMOL/L — SIGNIFICANT CHANGE UP (ref 5–17)
BASE EXCESS BLDV CALC-SCNC: 5.7 MMOL/L — HIGH (ref -2–3)
BASE EXCESS BLDV CALC-SCNC: 5.7 MMOL/L — HIGH (ref -2–3)
BUN SERPL-MCNC: 31 MG/DL — HIGH (ref 7–23)
BUN SERPL-MCNC: 31 MG/DL — HIGH (ref 7–23)
BUN SERPL-MCNC: 33 MG/DL — HIGH (ref 7–23)
BUN SERPL-MCNC: 33 MG/DL — HIGH (ref 7–23)
BUN SERPL-MCNC: 35 MG/DL — HIGH (ref 7–23)
BUN SERPL-MCNC: 35 MG/DL — HIGH (ref 7–23)
CA-I SERPL-SCNC: 1.18 MMOL/L — SIGNIFICANT CHANGE UP (ref 1.15–1.33)
CA-I SERPL-SCNC: 1.18 MMOL/L — SIGNIFICANT CHANGE UP (ref 1.15–1.33)
CALCIUM SERPL-MCNC: 9.1 MG/DL — SIGNIFICANT CHANGE UP (ref 8.4–10.5)
CHLORIDE BLDV-SCNC: 104 MMOL/L — SIGNIFICANT CHANGE UP (ref 96–108)
CHLORIDE BLDV-SCNC: 104 MMOL/L — SIGNIFICANT CHANGE UP (ref 96–108)
CHLORIDE SERPL-SCNC: 103 MMOL/L — SIGNIFICANT CHANGE UP (ref 96–108)
CHLORIDE SERPL-SCNC: 103 MMOL/L — SIGNIFICANT CHANGE UP (ref 96–108)
CHLORIDE SERPL-SCNC: 104 MMOL/L — SIGNIFICANT CHANGE UP (ref 96–108)
CHLORIDE SERPL-SCNC: 104 MMOL/L — SIGNIFICANT CHANGE UP (ref 96–108)
CHLORIDE SERPL-SCNC: 107 MMOL/L — SIGNIFICANT CHANGE UP (ref 96–108)
CHLORIDE SERPL-SCNC: 107 MMOL/L — SIGNIFICANT CHANGE UP (ref 96–108)
CO2 BLDV-SCNC: 34 MMOL/L — HIGH (ref 22–26)
CO2 BLDV-SCNC: 34 MMOL/L — HIGH (ref 22–26)
CO2 SERPL-SCNC: 29 MMOL/L — SIGNIFICANT CHANGE UP (ref 22–31)
CO2 SERPL-SCNC: 30 MMOL/L — SIGNIFICANT CHANGE UP (ref 22–31)
CO2 SERPL-SCNC: 30 MMOL/L — SIGNIFICANT CHANGE UP (ref 22–31)
CREAT SERPL-MCNC: 6.33 MG/DL — HIGH (ref 0.5–1.3)
CREAT SERPL-MCNC: 6.33 MG/DL — HIGH (ref 0.5–1.3)
CREAT SERPL-MCNC: 6.58 MG/DL — HIGH (ref 0.5–1.3)
CREAT SERPL-MCNC: 6.58 MG/DL — HIGH (ref 0.5–1.3)
CREAT SERPL-MCNC: 6.98 MG/DL — HIGH (ref 0.5–1.3)
CREAT SERPL-MCNC: 6.98 MG/DL — HIGH (ref 0.5–1.3)
EGFR: 7 ML/MIN/1.73M2 — LOW
EGFR: 7 ML/MIN/1.73M2 — LOW
EGFR: 8 ML/MIN/1.73M2 — LOW
GAS PNL BLDV: 141 MMOL/L — SIGNIFICANT CHANGE UP (ref 136–145)
GAS PNL BLDV: 141 MMOL/L — SIGNIFICANT CHANGE UP (ref 136–145)
GAS PNL BLDV: SIGNIFICANT CHANGE UP
GLUCOSE BLDC GLUCOMTR-MCNC: 154 MG/DL — HIGH (ref 70–99)
GLUCOSE BLDC GLUCOMTR-MCNC: 154 MG/DL — HIGH (ref 70–99)
GLUCOSE BLDC GLUCOMTR-MCNC: 177 MG/DL — HIGH (ref 70–99)
GLUCOSE BLDC GLUCOMTR-MCNC: 177 MG/DL — HIGH (ref 70–99)
GLUCOSE BLDC GLUCOMTR-MCNC: 80 MG/DL — SIGNIFICANT CHANGE UP (ref 70–99)
GLUCOSE BLDC GLUCOMTR-MCNC: 80 MG/DL — SIGNIFICANT CHANGE UP (ref 70–99)
GLUCOSE BLDV-MCNC: 108 MG/DL — HIGH (ref 70–99)
GLUCOSE BLDV-MCNC: 108 MG/DL — HIGH (ref 70–99)
GLUCOSE SERPL-MCNC: 102 MG/DL — HIGH (ref 70–99)
GLUCOSE SERPL-MCNC: 102 MG/DL — HIGH (ref 70–99)
GLUCOSE SERPL-MCNC: 109 MG/DL — HIGH (ref 70–99)
GLUCOSE SERPL-MCNC: 109 MG/DL — HIGH (ref 70–99)
GLUCOSE SERPL-MCNC: 75 MG/DL — SIGNIFICANT CHANGE UP (ref 70–99)
GLUCOSE SERPL-MCNC: 75 MG/DL — SIGNIFICANT CHANGE UP (ref 70–99)
HCO3 BLDV-SCNC: 33 MMOL/L — HIGH (ref 22–29)
HCO3 BLDV-SCNC: 33 MMOL/L — HIGH (ref 22–29)
HCT VFR BLDA CALC: 32 % — LOW (ref 39–51)
HCT VFR BLDA CALC: 32 % — LOW (ref 39–51)
HGB BLD CALC-MCNC: 10.5 G/DL — LOW (ref 12.6–17.4)
HGB BLD CALC-MCNC: 10.5 G/DL — LOW (ref 12.6–17.4)
LACTATE BLDV-MCNC: 1 MMOL/L — SIGNIFICANT CHANGE UP (ref 0.5–2)
LACTATE BLDV-MCNC: 1 MMOL/L — SIGNIFICANT CHANGE UP (ref 0.5–2)
MAGNESIUM SERPL-MCNC: 2.4 MG/DL — SIGNIFICANT CHANGE UP (ref 1.6–2.6)
MAGNESIUM SERPL-MCNC: 2.4 MG/DL — SIGNIFICANT CHANGE UP (ref 1.6–2.6)
PCO2 BLDV: 59 MMHG — HIGH (ref 42–55)
PCO2 BLDV: 59 MMHG — HIGH (ref 42–55)
PH BLDV: 7.35 — SIGNIFICANT CHANGE UP (ref 7.32–7.43)
PH BLDV: 7.35 — SIGNIFICANT CHANGE UP (ref 7.32–7.43)
PO2 BLDV: 30 MMHG — SIGNIFICANT CHANGE UP (ref 25–45)
PO2 BLDV: 30 MMHG — SIGNIFICANT CHANGE UP (ref 25–45)
POTASSIUM BLDV-SCNC: 5.9 MMOL/L — HIGH (ref 3.5–5.1)
POTASSIUM BLDV-SCNC: 5.9 MMOL/L — HIGH (ref 3.5–5.1)
POTASSIUM SERPL-MCNC: 5 MMOL/L — SIGNIFICANT CHANGE UP (ref 3.5–5.3)
POTASSIUM SERPL-MCNC: 5 MMOL/L — SIGNIFICANT CHANGE UP (ref 3.5–5.3)
POTASSIUM SERPL-MCNC: 6.1 MMOL/L — HIGH (ref 3.5–5.3)
POTASSIUM SERPL-MCNC: 6.1 MMOL/L — HIGH (ref 3.5–5.3)
POTASSIUM SERPL-MCNC: 6.4 MMOL/L — CRITICAL HIGH (ref 3.5–5.3)
POTASSIUM SERPL-MCNC: 6.4 MMOL/L — CRITICAL HIGH (ref 3.5–5.3)
POTASSIUM SERPL-SCNC: 5 MMOL/L — SIGNIFICANT CHANGE UP (ref 3.5–5.3)
POTASSIUM SERPL-SCNC: 5 MMOL/L — SIGNIFICANT CHANGE UP (ref 3.5–5.3)
POTASSIUM SERPL-SCNC: 6.1 MMOL/L — HIGH (ref 3.5–5.3)
POTASSIUM SERPL-SCNC: 6.1 MMOL/L — HIGH (ref 3.5–5.3)
POTASSIUM SERPL-SCNC: 6.4 MMOL/L — CRITICAL HIGH (ref 3.5–5.3)
POTASSIUM SERPL-SCNC: 6.4 MMOL/L — CRITICAL HIGH (ref 3.5–5.3)
SAO2 % BLDV: 41.2 % — LOW (ref 67–88)
SAO2 % BLDV: 41.2 % — LOW (ref 67–88)
SODIUM SERPL-SCNC: 144 MMOL/L — SIGNIFICANT CHANGE UP (ref 135–145)
SODIUM SERPL-SCNC: 144 MMOL/L — SIGNIFICANT CHANGE UP (ref 135–145)
SODIUM SERPL-SCNC: 145 MMOL/L — SIGNIFICANT CHANGE UP (ref 135–145)
SODIUM SERPL-SCNC: 145 MMOL/L — SIGNIFICANT CHANGE UP (ref 135–145)
SODIUM SERPL-SCNC: 147 MMOL/L — HIGH (ref 135–145)
SODIUM SERPL-SCNC: 147 MMOL/L — HIGH (ref 135–145)

## 2023-11-21 PROCEDURE — 99223 1ST HOSP IP/OBS HIGH 75: CPT

## 2023-11-21 RX ORDER — LEVOTHYROXINE SODIUM 125 MCG
1 TABLET ORAL
Refills: 0 | DISCHARGE

## 2023-11-21 RX ORDER — TIOTROPIUM BROMIDE 18 UG/1
2 CAPSULE ORAL; RESPIRATORY (INHALATION) DAILY
Refills: 0 | Status: DISCONTINUED | OUTPATIENT
Start: 2023-11-21 | End: 2023-12-01

## 2023-11-21 RX ORDER — MIDODRINE HYDROCHLORIDE 2.5 MG/1
10 TABLET ORAL
Refills: 0 | Status: DISCONTINUED | OUTPATIENT
Start: 2023-11-21 | End: 2023-12-01

## 2023-11-21 RX ORDER — INSULIN HUMAN 100 [IU]/ML
5 INJECTION, SOLUTION SUBCUTANEOUS ONCE
Refills: 0 | Status: COMPLETED | OUTPATIENT
Start: 2023-11-21 | End: 2023-11-21

## 2023-11-21 RX ORDER — ATORVASTATIN CALCIUM 80 MG/1
1 TABLET, FILM COATED ORAL
Refills: 0 | DISCHARGE

## 2023-11-21 RX ORDER — METOPROLOL TARTRATE 50 MG
25 TABLET ORAL DAILY
Refills: 0 | Status: DISCONTINUED | OUTPATIENT
Start: 2023-11-21 | End: 2023-11-28

## 2023-11-21 RX ORDER — ALBUTEROL 90 UG/1
2.5 AEROSOL, METERED ORAL ONCE
Refills: 0 | Status: COMPLETED | OUTPATIENT
Start: 2023-11-21 | End: 2023-11-21

## 2023-11-21 RX ORDER — LEVOTHYROXINE SODIUM 125 MCG
137 TABLET ORAL DAILY
Refills: 0 | Status: DISCONTINUED | OUTPATIENT
Start: 2023-11-21 | End: 2023-11-28

## 2023-11-21 RX ORDER — TAMSULOSIN HYDROCHLORIDE 0.4 MG/1
1 CAPSULE ORAL
Refills: 0 | DISCHARGE

## 2023-11-21 RX ORDER — SODIUM ZIRCONIUM CYCLOSILICATE 10 G/10G
10 POWDER, FOR SUSPENSION ORAL THREE TIMES A DAY
Refills: 0 | Status: COMPLETED | OUTPATIENT
Start: 2023-11-21 | End: 2023-11-23

## 2023-11-21 RX ORDER — FUROSEMIDE 40 MG
40 TABLET ORAL ONCE
Refills: 0 | Status: COMPLETED | OUTPATIENT
Start: 2023-11-21 | End: 2023-11-21

## 2023-11-21 RX ORDER — TAMSULOSIN HYDROCHLORIDE 0.4 MG/1
0.4 CAPSULE ORAL AT BEDTIME
Refills: 0 | Status: DISCONTINUED | OUTPATIENT
Start: 2023-11-21 | End: 2023-12-01

## 2023-11-21 RX ORDER — DEXTROSE 50 % IN WATER 50 %
50 SYRINGE (ML) INTRAVENOUS ONCE
Refills: 0 | Status: COMPLETED | OUTPATIENT
Start: 2023-11-21 | End: 2023-11-21

## 2023-11-21 RX ORDER — ALBUTEROL 90 UG/1
2 AEROSOL, METERED ORAL EVERY 6 HOURS
Refills: 0 | Status: DISCONTINUED | OUTPATIENT
Start: 2023-11-21 | End: 2023-12-01

## 2023-11-21 RX ORDER — PARICALCITOL 2 UG/1
1 CAPSULE, GELATIN COATED ORAL
Refills: 0 | DISCHARGE

## 2023-11-21 RX ORDER — LANOLIN ALCOHOL/MO/W.PET/CERES
3 CREAM (GRAM) TOPICAL AT BEDTIME
Refills: 0 | Status: DISCONTINUED | OUTPATIENT
Start: 2023-11-21 | End: 2023-12-01

## 2023-11-21 RX ORDER — BUDESONIDE AND FORMOTEROL FUMARATE DIHYDRATE 160; 4.5 UG/1; UG/1
2 AEROSOL RESPIRATORY (INHALATION)
Refills: 0 | Status: DISCONTINUED | OUTPATIENT
Start: 2023-11-21 | End: 2023-12-01

## 2023-11-21 RX ORDER — ATORVASTATIN CALCIUM 80 MG/1
20 TABLET, FILM COATED ORAL AT BEDTIME
Refills: 0 | Status: DISCONTINUED | OUTPATIENT
Start: 2023-11-21 | End: 2023-12-01

## 2023-11-21 RX ORDER — FLUTICASONE FUROATE, UMECLIDINIUM BROMIDE AND VILANTEROL TRIFENATATE 200; 62.5; 25 UG/1; UG/1; UG/1
1 POWDER RESPIRATORY (INHALATION)
Refills: 0 | DISCHARGE

## 2023-11-21 RX ORDER — MIDODRINE HYDROCHLORIDE 2.5 MG/1
2 TABLET ORAL
Refills: 0 | DISCHARGE

## 2023-11-21 RX ORDER — METOPROLOL TARTRATE 50 MG
1 TABLET ORAL
Refills: 0 | DISCHARGE

## 2023-11-21 RX ORDER — CALCIUM ACETATE 667 MG
1334 TABLET ORAL
Refills: 0 | Status: DISCONTINUED | OUTPATIENT
Start: 2023-11-21 | End: 2023-12-01

## 2023-11-21 RX ORDER — SEVELAMER CARBONATE 2400 MG/1
1 POWDER, FOR SUSPENSION ORAL
Refills: 0 | DISCHARGE

## 2023-11-21 RX ORDER — SODIUM ZIRCONIUM CYCLOSILICATE 10 G/10G
10 POWDER, FOR SUSPENSION ORAL ONCE
Refills: 0 | Status: COMPLETED | OUTPATIENT
Start: 2023-11-21 | End: 2023-11-21

## 2023-11-21 RX ORDER — SENNA PLUS 8.6 MG/1
1 TABLET ORAL
Refills: 0 | DISCHARGE

## 2023-11-21 RX ORDER — CALCIUM ACETATE 667 MG
3 TABLET ORAL
Refills: 0 | DISCHARGE

## 2023-11-21 RX ORDER — RISEDRONATE SODIUM 25.8; 4.2 MG/1; MG/1
1 TABLET, FILM COATED ORAL
Refills: 0 | DISCHARGE

## 2023-11-21 RX ORDER — IPRATROPIUM/ALBUTEROL SULFATE 18-103MCG
1 AEROSOL WITH ADAPTER (GRAM) INHALATION
Refills: 0 | DISCHARGE

## 2023-11-21 RX ORDER — CLOPIDOGREL BISULFATE 75 MG/1
1 TABLET, FILM COATED ORAL
Refills: 0 | DISCHARGE

## 2023-11-21 RX ORDER — ASPIRIN/CALCIUM CARB/MAGNESIUM 324 MG
81 TABLET ORAL DAILY
Refills: 0 | Status: DISCONTINUED | OUTPATIENT
Start: 2023-11-21 | End: 2023-12-01

## 2023-11-21 RX ORDER — ONDANSETRON 8 MG/1
4 TABLET, FILM COATED ORAL EVERY 8 HOURS
Refills: 0 | Status: DISCONTINUED | OUTPATIENT
Start: 2023-11-21 | End: 2023-12-01

## 2023-11-21 RX ADMIN — Medication 81 MILLIGRAM(S): at 18:15

## 2023-11-21 RX ADMIN — Medication 50 MILLILITER(S): at 14:34

## 2023-11-21 RX ADMIN — INSULIN HUMAN 5 UNIT(S): 100 INJECTION, SOLUTION SUBCUTANEOUS at 14:32

## 2023-11-21 RX ADMIN — BUDESONIDE AND FORMOTEROL FUMARATE DIHYDRATE 2 PUFF(S): 160; 4.5 AEROSOL RESPIRATORY (INHALATION) at 18:15

## 2023-11-21 RX ADMIN — ALBUTEROL 2.5 MILLIGRAM(S): 90 AEROSOL, METERED ORAL at 14:37

## 2023-11-21 RX ADMIN — SODIUM ZIRCONIUM CYCLOSILICATE 10 GRAM(S): 10 POWDER, FOR SUSPENSION ORAL at 14:36

## 2023-11-21 RX ADMIN — TAMSULOSIN HYDROCHLORIDE 0.4 MILLIGRAM(S): 0.4 CAPSULE ORAL at 21:57

## 2023-11-21 RX ADMIN — Medication 40 MILLIGRAM(S): at 18:15

## 2023-11-21 RX ADMIN — SODIUM ZIRCONIUM CYCLOSILICATE 10 GRAM(S): 10 POWDER, FOR SUSPENSION ORAL at 21:57

## 2023-11-21 RX ADMIN — ATORVASTATIN CALCIUM 20 MILLIGRAM(S): 80 TABLET, FILM COATED ORAL at 21:57

## 2023-11-21 RX ADMIN — Medication 1334 MILLIGRAM(S): at 18:15

## 2023-11-21 NOTE — H&P ADULT - PROBLEM SELECTOR PLAN 4
Chest pain free. s/p diagnostic cath on 11/16. Has  85 % stenosis in distal Lcx. 40 % stenosis in LAD, unchanged from prior.   - c/w aspirin and statin  - Hold plavix for pericardial drain

## 2023-11-21 NOTE — CONSULT NOTE ADULT - SUBJECTIVE AND OBJECTIVE BOX
[facility] DIVISION OF KIDNEY DISEASES AND HYPERTENSION -- [phone]  -- INITIAL CONSULT NOTE  --------------------------------------------------------------------------------  HPI:  88yo Male with PMHx of HTN, HLD, CAD s/p diagnostic LHC 06/2023 showing severe mLAD disease with severe OM not amendable to stenting, prior LAD + proximal OM stent, PCI/SKYLAR of mLAD x1 (Dr. Wesley) - 08/2023, PVD, LVEF 65-70%, ESRD - on HD diagnosed 6-years ago MWF schedule via AVF left elbow w/ last session 11/20/2023 [place] [facility] [place] and follows with Dr. Sandra Monte, and anemia presenting to the hospital for pericardial drain. Procedure cancelled 2/2 hyperkalemia. States tolerated full 3 hr session of HD yesterday.   Of note pt was recently admitted for evaluation of worsening CAD w/ CP, SOB, exertional dyspnea. He underwent a LHC and was found to non obstructive CAD. Additionally he was found to have a predominantly posterior pericardial effusion, confirmed on TTE. After discussion between cardiology and IR, the patient is planned for a pericardial window outpatient Tuesday, 11/21 after holding Plavix for 5-days via interventional radiology.  Nephrology was consulted for management of hyperkalemia and ESRD management.        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      BPV (benign positional vertigo)      HTN (hypertension)      Pericardial effusion      History of coronary artery stent placement  5 yrs ago      Pericardial effusion  drained        FAMILY HISTORY:    PAST SOCIAL HISTORY:    ALLERGIES & MEDICATIONS  --------------------------------------------------------------------------------  Allergies    No Known Allergies    Intolerances    acetaminophen (Other (Mild))    Standing Inpatient Medications  aspirin enteric coated 81 milliGRAM(s) Oral daily  atorvastatin 20 milliGRAM(s) Oral at bedtime  budesonide  80 MICROgram(s)/formoterol 4.5 MICROgram(s) Inhaler 2 Puff(s) Inhalation two times a day  calcium acetate 1334 milliGRAM(s) Oral three times a day with meals  furosemide   Injectable 40 milliGRAM(s) IV Push once  levothyroxine 137 MICROGram(s) Oral daily  metoprolol succinate ER 25 milliGRAM(s) Oral daily  Nephro-lenore 1 Tablet(s) Oral daily  tamsulosin 0.4 milliGRAM(s) Oral at bedtime  tiotropium 2.5 MICROgram(s) Inhaler 2 Puff(s) Inhalation daily    PRN Inpatient Medications  albuterol    90 MICROgram(s) HFA Inhaler 2 Puff(s) Inhalation every 6 hours PRN  aluminum hydroxide/magnesium hydroxide/simethicone Suspension 30 milliLiter(s) Oral every 4 hours PRN  melatonin 3 milliGRAM(s) Oral at bedtime PRN  midodrine. 10 milliGRAM(s) Oral <User Schedule> PRN  ondansetron Injectable 4 milliGRAM(s) IV Push every 8 hours PRN      REVIEW OF SYSTEMS  --------------------------------------------------------------------------------  Gen: No fevers/chills  Skin: No rashes  Head/Eyes/Ears: Normal hearing,   Respiratory: No dyspnea, cough  CV: No chest pain  GI: No abdominal pain, diarrhea  : No dysuria, hematuria  MSK: No  edema  Heme: No easy bruising or bleeding  Psych: No significant depression    All other systems were reviewed and are negative, except as noted.    VITALS/PHYSICAL EXAM  --------------------------------------------------------------------------------  T(C): 36.6 (11-21-23 @ 11:50), Max: 36.6 (11-21-23 @ 11:50)  HR: 73 (11-21-23 @ 11:50) (73 - 73)  BP: 181/78 (11-21-23 @ 11:50) (181/78 - 181/78)  RR: 16 (11-21-23 @ 11:50) (16 - 16)  SpO2: 97% (11-21-23 @ 11:50) (97% - 97%)  Wt(kg): --  Height (cm): 165.1 (11-21-23 @ 11:50)  Weight (kg): 82 (11-21-23 @ 11:50)  BMI (kg/m2): 30.1 (11-21-23 @ 11:50)  BSA (m2): 1.89 (11-21-23 @ 11:50)      Physical Exam:  Gen: NAD  HEENT: Anicteric  Pulm: CTA B/L  CV: S1S2+  Abd: Soft, +BS    Ext: No LE edema B/L  Neuro: Awake  Skin: Warm and dry  Dialysis access: LUE AVF connected to HD machine    LABS/STUDIES  --------------------------------------------------------------------------------    147  |  107  |  33  ----------------------------<  102      [11-21-23 @ 13:50]  6.1   |  30  |  6.58        Ca     9.1     [11-21-23 @ 13:50]            Creatinine Trend:  SCr 6.58 [11-21 @ 13:50]  SCr 6.33 [11-21 @ 12:04]  SCr 5.41 [11-16 @ 07:16]    Urinalysis - [11-21-23 @ 13:50]      Color  / Appearance  / SG  / pH       Gluc 102 / Ketone   / Bili  / Urobili        Blood  / Protein  / Leuk Est  / Nitrite       RBC  / WBC  / Hyaline  / Gran  / Sq Epi  / Non Sq Epi  / Bacteria            [facility] DIVISION OF KIDNEY DISEASES AND HYPERTENSION -- [phone]  -- INITIAL CONSULT NOTE  --------------------------------------------------------------------------------  HPI:  86yo Male with PMHx of HTN, HLD, CAD s/p diagnostic LHC 06/2023 showing severe mLAD disease with severe OM not amendable to stenting, prior LAD + proximal OM stent, PCI/SKYLAR of mLAD x1 (Dr. Wesley) - 08/2023, PVD, LVEF 65-70%, ESRD - on HD diagnosed 6-years ago MWF schedule via AVF left elbow w/ last session 11/20/2023 at [facility] and follows with Dr. Sandra Monte, and anemia presenting to the hospital for pericardial drain. Procedure cancelled 2/2 hyperkalemia. States tolerated full 3 hr session of HD yesterday.   Of note pt was recently admitted for evaluation of worsening CAD w/ CP, SOB, exertional dyspnea. He underwent a LHC and was found to non obstructive CAD. Additionally he was found to have a predominantly posterior pericardial effusion, confirmed on TTE. After discussion between cardiology and IR, the patient is planned for a pericardial window outpatient Tuesday, 11/21 after holding Plavix for 5-days via interventional radiology.  Nephrology was consulted for management of hyperkalemia and ESRD management.        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      BPV (benign positional vertigo)      HTN (hypertension)      Pericardial effusion      History of coronary artery stent placement  5 yrs ago      Pericardial effusion  drained        FAMILY HISTORY:    PAST SOCIAL HISTORY:    ALLERGIES & MEDICATIONS  --------------------------------------------------------------------------------  Allergies    No Known Allergies    Intolerances    acetaminophen (Other (Mild))    Standing Inpatient Medications  aspirin enteric coated 81 milliGRAM(s) Oral daily  atorvastatin 20 milliGRAM(s) Oral at bedtime  budesonide  80 MICROgram(s)/formoterol 4.5 MICROgram(s) Inhaler 2 Puff(s) Inhalation two times a day  calcium acetate 1334 milliGRAM(s) Oral three times a day with meals  furosemide   Injectable 40 milliGRAM(s) IV Push once  levothyroxine 137 MICROGram(s) Oral daily  metoprolol succinate ER 25 milliGRAM(s) Oral daily  Nephro-lenore 1 Tablet(s) Oral daily  tamsulosin 0.4 milliGRAM(s) Oral at bedtime  tiotropium 2.5 MICROgram(s) Inhaler 2 Puff(s) Inhalation daily    PRN Inpatient Medications  albuterol    90 MICROgram(s) HFA Inhaler 2 Puff(s) Inhalation every 6 hours PRN  aluminum hydroxide/magnesium hydroxide/simethicone Suspension 30 milliLiter(s) Oral every 4 hours PRN  melatonin 3 milliGRAM(s) Oral at bedtime PRN  midodrine. 10 milliGRAM(s) Oral <User Schedule> PRN  ondansetron Injectable 4 milliGRAM(s) IV Push every 8 hours PRN      REVIEW OF SYSTEMS  --------------------------------------------------------------------------------  Gen: No fevers/chills  Skin: No rashes  Head/Eyes/Ears: Normal hearing,   Respiratory: No dyspnea, cough  CV: No chest pain  GI: No abdominal pain, diarrhea  : No dysuria, hematuria  MSK: No  edema  Heme: No easy bruising or bleeding  Psych: No significant depression    All other systems were reviewed and are negative, except as noted.    VITALS/PHYSICAL EXAM  --------------------------------------------------------------------------------  T(C): 36.6 (11-21-23 @ 11:50), Max: 36.6 (11-21-23 @ 11:50)  HR: 73 (11-21-23 @ 11:50) (73 - 73)  BP: 181/78 (11-21-23 @ 11:50) (181/78 - 181/78)  RR: 16 (11-21-23 @ 11:50) (16 - 16)  SpO2: 97% (11-21-23 @ 11:50) (97% - 97%)  Wt(kg): --  Height (cm): 165.1 (11-21-23 @ 11:50)  Weight (kg): 82 (11-21-23 @ 11:50)  BMI (kg/m2): 30.1 (11-21-23 @ 11:50)  BSA (m2): 1.89 (11-21-23 @ 11:50)      Physical Exam:  Gen: NAD  HEENT: Anicteric  Pulm: CTA B/L  CV: S1S2+  Abd: Soft, +BS    Ext: No LE edema B/L  Neuro: Awake  Skin: Warm and dry  Dialysis access: LUE AVF connected to HD machine    LABS/STUDIES  --------------------------------------------------------------------------------    147  |  107  |  33  ----------------------------<  102      [11-21-23 @ 13:50]  6.1   |  30  |  6.58        Ca     9.1     [11-21-23 @ 13:50]            Creatinine Trend:  SCr 6.58 [11-21 @ 13:50]  SCr 6.33 [11-21 @ 12:04]  SCr 5.41 [11-16 @ 07:16]    Urinalysis - [11-21-23 @ 13:50]      Color  / Appearance  / SG  / pH       Gluc 102 / Ketone   / Bili  / Urobili        Blood  / Protein  / Leuk Est  / Nitrite       RBC  / WBC  / Hyaline  / Gran  / Sq Epi  / Non Sq Epi  / Bacteria            [facility] DIVISION OF KIDNEY DISEASES AND HYPERTENSION -- [phone]  -- INITIAL CONSULT NOTE  --------------------------------------------------------------------------------  HPI:  88yo Male with PMHx of HTN, HLD, CAD s/p diagnostic LHC 06/2023 showing severe mLAD disease with severe OM not amendable to stenting, prior LAD + proximal OM stent, PCI/SKYLAR of mLAD x1 (Dr. Wesley) - 08/2023, PVD, LVEF 65-70%, ESRD - on HD diagnosed 6-years ago MWF schedule via AVF left elbow w/ last session 11/20/2023 at [facility] [place] and follows with Dr. Sandra Monte, and anemia presenting to the hospital for pericardial drain. Procedure cancelled 2/2 hyperkalemia. States tolerated full 3 hr session of HD yesterday.   Of note pt was recently admitted for evaluation of worsening CAD w/ CP, SOB, exertional dyspnea. He underwent a LHC and was found to non obstructive CAD. Additionally he was found to have a predominantly posterior pericardial effusion, confirmed on TTE. After discussion between cardiology and IR, the patient is planned for a pericardial window outpatient Tuesday, 11/21 after holding Plavix for 5-days via interventional radiology.  Nephrology was consulted for management of hyperkalemia and ESRD management.        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      BPV (benign positional vertigo)      HTN (hypertension)      Pericardial effusion      History of coronary artery stent placement  5 yrs ago      Pericardial effusion  drained        FAMILY HISTORY:    PAST SOCIAL HISTORY:    ALLERGIES & MEDICATIONS  --------------------------------------------------------------------------------  Allergies    No Known Allergies    Intolerances    acetaminophen (Other (Mild))    Standing Inpatient Medications  aspirin enteric coated 81 milliGRAM(s) Oral daily  atorvastatin 20 milliGRAM(s) Oral at bedtime  budesonide  80 MICROgram(s)/formoterol 4.5 MICROgram(s) Inhaler 2 Puff(s) Inhalation two times a day  calcium acetate 1334 milliGRAM(s) Oral three times a day with meals  furosemide   Injectable 40 milliGRAM(s) IV Push once  levothyroxine 137 MICROGram(s) Oral daily  metoprolol succinate ER 25 milliGRAM(s) Oral daily  Nephro-lenore 1 Tablet(s) Oral daily  tamsulosin 0.4 milliGRAM(s) Oral at bedtime  tiotropium 2.5 MICROgram(s) Inhaler 2 Puff(s) Inhalation daily    PRN Inpatient Medications  albuterol    90 MICROgram(s) HFA Inhaler 2 Puff(s) Inhalation every 6 hours PRN  aluminum hydroxide/magnesium hydroxide/simethicone Suspension 30 milliLiter(s) Oral every 4 hours PRN  melatonin 3 milliGRAM(s) Oral at bedtime PRN  midodrine. 10 milliGRAM(s) Oral <User Schedule> PRN  ondansetron Injectable 4 milliGRAM(s) IV Push every 8 hours PRN      REVIEW OF SYSTEMS  --------------------------------------------------------------------------------  Gen: No fevers/chills  Skin: No rashes  Head/Eyes/Ears: Normal hearing,   Respiratory: No dyspnea, cough  CV: No chest pain  GI: No abdominal pain, diarrhea  : No dysuria, hematuria  MSK: No  edema  Heme: No easy bruising or bleeding  Psych: No significant depression    All other systems were reviewed and are negative, except as noted.    VITALS/PHYSICAL EXAM  --------------------------------------------------------------------------------  T(C): 36.6 (11-21-23 @ 11:50), Max: 36.6 (11-21-23 @ 11:50)  HR: 73 (11-21-23 @ 11:50) (73 - 73)  BP: 181/78 (11-21-23 @ 11:50) (181/78 - 181/78)  RR: 16 (11-21-23 @ 11:50) (16 - 16)  SpO2: 97% (11-21-23 @ 11:50) (97% - 97%)  Wt(kg): --  Height (cm): 165.1 (11-21-23 @ 11:50)  Weight (kg): 82 (11-21-23 @ 11:50)  BMI (kg/m2): 30.1 (11-21-23 @ 11:50)  BSA (m2): 1.89 (11-21-23 @ 11:50)      Physical Exam:  Gen: NAD  HEENT: Anicteric  Pulm: CTA B/L  CV: S1S2+  Abd: Soft, +BS    Ext: No LE edema B/L  Neuro: Awake  Skin: Warm and dry  Dialysis access: LUE AVF connected to HD machine    LABS/STUDIES  --------------------------------------------------------------------------------    147  |  107  |  33  ----------------------------<  102      [11-21-23 @ 13:50]  6.1   |  30  |  6.58        Ca     9.1     [11-21-23 @ 13:50]            Creatinine Trend:  SCr 6.58 [11-21 @ 13:50]  SCr 6.33 [11-21 @ 12:04]  SCr 5.41 [11-16 @ 07:16]    Urinalysis - [11-21-23 @ 13:50]      Color  / Appearance  / SG  / pH       Gluc 102 / Ketone   / Bili  / Urobili        Blood  / Protein  / Leuk Est  / Nitrite       RBC  / WBC  / Hyaline  / Gran  / Sq Epi  / Non Sq Epi  / Bacteria            [facility] DIVISION OF KIDNEY DISEASES AND HYPERTENSION -- [phone]  -- INITIAL CONSULT NOTE  --------------------------------------------------------------------------------  HPI:  86yo Male with PMHx of HTN, HLD, CAD s/p diagnostic LHC 06/2023 showing severe mLAD disease with severe OM not amendable to stenting, prior LAD + proximal OM stent, PCI/SKYLAR of mLAD x1 (Dr. Wesley) - 08/2023, PVD, LVEF 65-70%, ESRD - on HD diagnosed 6-years ago MWF schedule via AVF left elbow w/ last session 11/20/2023 at [facility] and follows with Dr. Sandra Monte, and anemia presenting to the hospital for pericardial drain. Procedure cancelled 2/2 hyperkalemia. States tolerated full 3 hr session of HD yesterday.   Of note pt was recently admitted for evaluation of worsening CAD w/ CP, SOB, exertional dyspnea. He underwent a LHC and was found to non obstructive CAD. Additionally he was found to have a predominantly posterior pericardial effusion, confirmed on TTE. After discussion between cardiology and IR, the patient is planned for a pericardial window outpatient Tuesday, 11/21 after holding Plavix for 5-days via interventional radiology.  Nephrology was consulted for management of hyperkalemia and ESRD management.        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      BPV (benign positional vertigo)      HTN (hypertension)      Pericardial effusion      History of coronary artery stent placement  5 yrs ago      Pericardial effusion  drained        FAMILY HISTORY:    PAST SOCIAL HISTORY:    ALLERGIES & MEDICATIONS  --------------------------------------------------------------------------------  Allergies    No Known Allergies    Intolerances    acetaminophen (Other (Mild))    Standing Inpatient Medications  aspirin enteric coated 81 milliGRAM(s) Oral daily  atorvastatin 20 milliGRAM(s) Oral at bedtime  budesonide  80 MICROgram(s)/formoterol 4.5 MICROgram(s) Inhaler 2 Puff(s) Inhalation two times a day  calcium acetate 1334 milliGRAM(s) Oral three times a day with meals  furosemide   Injectable 40 milliGRAM(s) IV Push once  levothyroxine 137 MICROGram(s) Oral daily  metoprolol succinate ER 25 milliGRAM(s) Oral daily  Nephro-lenore 1 Tablet(s) Oral daily  tamsulosin 0.4 milliGRAM(s) Oral at bedtime  tiotropium 2.5 MICROgram(s) Inhaler 2 Puff(s) Inhalation daily    PRN Inpatient Medications  albuterol    90 MICROgram(s) HFA Inhaler 2 Puff(s) Inhalation every 6 hours PRN  aluminum hydroxide/magnesium hydroxide/simethicone Suspension 30 milliLiter(s) Oral every 4 hours PRN  melatonin 3 milliGRAM(s) Oral at bedtime PRN  midodrine. 10 milliGRAM(s) Oral <User Schedule> PRN  ondansetron Injectable 4 milliGRAM(s) IV Push every 8 hours PRN      REVIEW OF SYSTEMS  --------------------------------------------------------------------------------  Gen: No fevers/chills  Skin: No rashes  Head/Eyes/Ears: Normal hearing,   Respiratory: No dyspnea, cough  CV: No chest pain  GI: No abdominal pain, diarrhea  : No dysuria, hematuria  MSK: No  edema  Heme: No easy bruising or bleeding  Psych: No significant depression    All other systems were reviewed and are negative, except as noted.    VITALS/PHYSICAL EXAM  --------------------------------------------------------------------------------  T(C): 36.6 (11-21-23 @ 11:50), Max: 36.6 (11-21-23 @ 11:50)  HR: 73 (11-21-23 @ 11:50) (73 - 73)  BP: 181/78 (11-21-23 @ 11:50) (181/78 - 181/78)  RR: 16 (11-21-23 @ 11:50) (16 - 16)  SpO2: 97% (11-21-23 @ 11:50) (97% - 97%)  Wt(kg): --  Height (cm): 165.1 (11-21-23 @ 11:50)  Weight (kg): 82 (11-21-23 @ 11:50)  BMI (kg/m2): 30.1 (11-21-23 @ 11:50)  BSA (m2): 1.89 (11-21-23 @ 11:50)      Physical Exam:  Gen: NAD  HEENT: Anicteric  Pulm: CTA B/L  CV: S1S2+  Abd: Soft, +BS    Ext: No LE edema B/L  Neuro: Awake  Skin: Warm and dry  Dialysis access: LUE AVF good thrill noted.       LABS/STUDIES  --------------------------------------------------------------------------------    147  |  107  |  33  ----------------------------<  102      [11-21-23 @ 13:50]  6.1   |  30  |  6.58        Ca     9.1     [11-21-23 @ 13:50]            Creatinine Trend:  SCr 6.58 [11-21 @ 13:50]  SCr 6.33 [11-21 @ 12:04]  SCr 5.41 [11-16 @ 07:16]    Urinalysis - [11-21-23 @ 13:50]      Color  / Appearance  / SG  / pH       Gluc 102 / Ketone   / Bili  / Urobili        Blood  / Protein  / Leuk Est  / Nitrite       RBC  / WBC  / Hyaline  / Gran  / Sq Epi  / Non Sq Epi  / Bacteria            [facility] DIVISION OF KIDNEY DISEASES AND HYPERTENSION -- [phone]  -- INITIAL CONSULT NOTE  --------------------------------------------------------------------------------  HPI:  88yo Male with PMHx of HTN, HLD, CAD s/p diagnostic LHC 06/2023 showing severe mLAD disease with severe OM not amendable to stenting, prior LAD + proximal OM stent, PCI/SKYLAR of mLAD x1 (Dr. Wesley) - 08/2023, PVD, LVEF 65-70%, ESRD - on HD diagnosed 6-years ago MWF schedule via AVF left elbow w/ last session 11/20/2023 at [facility] and follows with Dr. Sandra Monte, and anemia presenting to the hospital for pericardial drain. Procedure cancelled 2/2 hyperkalemia. States tolerated full 3 hr session of HD yesterday.   Of note pt was recently admitted for evaluation of worsening CAD w/ CP, SOB, exertional dyspnea. He underwent a LHC and was found to non obstructive CAD. Additionally he was found to have a predominantly posterior pericardial effusion, confirmed on TTE. After discussion between cardiology and IR, the patient is planned for a pericardial window outpatient Tuesday, 11/21 after holding Plavix for 5-days via interventional radiology.  Nephrology was consulted for management of hyperkalemia and ESRD management.        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      BPV (benign positional vertigo)      HTN (hypertension)      Pericardial effusion      History of coronary artery stent placement  5 yrs ago      Pericardial effusion  drained        FAMILY HISTORY:    PAST SOCIAL HISTORY:    ALLERGIES & MEDICATIONS  --------------------------------------------------------------------------------  Allergies    No Known Allergies    Intolerances    acetaminophen (Other (Mild))    Standing Inpatient Medications  aspirin enteric coated 81 milliGRAM(s) Oral daily  atorvastatin 20 milliGRAM(s) Oral at bedtime  budesonide  80 MICROgram(s)/formoterol 4.5 MICROgram(s) Inhaler 2 Puff(s) Inhalation two times a day  calcium acetate 1334 milliGRAM(s) Oral three times a day with meals  furosemide   Injectable 40 milliGRAM(s) IV Push once  levothyroxine 137 MICROGram(s) Oral daily  metoprolol succinate ER 25 milliGRAM(s) Oral daily  Nephro-lenore 1 Tablet(s) Oral daily  tamsulosin 0.4 milliGRAM(s) Oral at bedtime  tiotropium 2.5 MICROgram(s) Inhaler 2 Puff(s) Inhalation daily    PRN Inpatient Medications  albuterol    90 MICROgram(s) HFA Inhaler 2 Puff(s) Inhalation every 6 hours PRN  aluminum hydroxide/magnesium hydroxide/simethicone Suspension 30 milliLiter(s) Oral every 4 hours PRN  melatonin 3 milliGRAM(s) Oral at bedtime PRN  midodrine. 10 milliGRAM(s) Oral <User Schedule> PRN  ondansetron Injectable 4 milliGRAM(s) IV Push every 8 hours PRN      REVIEW OF SYSTEMS  --------------------------------------------------------------------------------  Gen: No fevers/chills  Skin: No rashes  Head/Eyes/Ears: Normal hearing,   Respiratory: No dyspnea, cough  CV: No chest pain  GI: No abdominal pain, diarrhea  : No dysuria, hematuria  MSK: No  edema  Heme: No easy bruising or bleeding  Psych: No significant depression    All other systems were reviewed and are negative, except as noted.    VITALS/PHYSICAL EXAM  --------------------------------------------------------------------------------  T(C): 36.6 (11-21-23 @ 11:50), Max: 36.6 (11-21-23 @ 11:50)  HR: 73 (11-21-23 @ 11:50) (73 - 73)  BP: 181/78 (11-21-23 @ 11:50) (181/78 - 181/78)  RR: 16 (11-21-23 @ 11:50) (16 - 16)  SpO2: 97% (11-21-23 @ 11:50) (97% - 97%)  Wt(kg): --  Height (cm): 165.1 (11-21-23 @ 11:50)  Weight (kg): 82 (11-21-23 @ 11:50)  BMI (kg/m2): 30.1 (11-21-23 @ 11:50)  BSA (m2): 1.89 (11-21-23 @ 11:50)      Physical Exam:  Gen: NAD  HEENT: Anicteric  Pulm: CTA B/L  CV: S1S2+  Abd: Soft, +BS    Ext: No LE edema B/L  Neuro: Awake  Skin: Warm and dry  Dialysis access: LUE AVF good thrill noted.       LABS/STUDIES  --------------------------------------------------------------------------------    147  |  107  |  33  ----------------------------<  102      [11-21-23 @ 13:50]  6.1   |  30  |  6.58        Ca     9.1     [11-21-23 @ 13:50]            Creatinine Trend:  SCr 6.58 [11-21 @ 13:50]  SCr 6.33 [11-21 @ 12:04]  SCr 5.41 [11-16 @ 07:16]    Urinalysis - [11-21-23 @ 13:50]      Color  / Appearance  / SG  / pH       Gluc 102 / Ketone   / Bili  / Urobili        Blood  / Protein  / Leuk Est  / Nitrite       RBC  / WBC  / Hyaline  / Gran  / Sq Epi  / Non Sq Epi  / Bacteria            [facility] DIVISION OF KIDNEY DISEASES AND HYPERTENSION -- [phone]  -- INITIAL CONSULT NOTE  --------------------------------------------------------------------------------  HPI:  88yo Male with PMHx of HTN, HLD, CAD s/p diagnostic LHC 06/2023 showing severe mLAD disease with severe OM not amendable to stenting, prior LAD + proximal OM stent, PCI/SKYLAR of mLAD x1 (Dr. Wesley) - 08/2023, PVD, LVEF 65-70%, ESRD - on HD diagnosed 6-years ago MWF schedule via AVF left elbow w/ last session 11/20/2023 at [facility] and follows with Dr. Sandra Monte, and anemia presenting to the hospital for pericardial drain. Procedure cancelled 2/2 hyperkalemia. States tolerated full 3 hr session of HD yesterday.   Of note pt was recently admitted for evaluation of worsening CAD w/ CP, SOB, exertional dyspnea. He underwent a LHC and was found to non obstructive CAD. Additionally he was found to have a predominantly posterior pericardial effusion, confirmed on TTE. After discussion between cardiology and IR, the patient is planned for a pericardial window outpatient Tuesday, 11/21 after holding Plavix for 5-days via interventional radiology.  Nephrology was consulted for management of hyperkalemia and ESRD management.        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      BPV (benign positional vertigo)      HTN (hypertension)      Pericardial effusion      History of coronary artery stent placement  5 yrs ago      Pericardial effusion  drained        FAMILY HISTORY:    PAST SOCIAL HISTORY:    ALLERGIES & MEDICATIONS  --------------------------------------------------------------------------------  Allergies    No Known Allergies    Intolerances    acetaminophen (Other (Mild))    Standing Inpatient Medications  aspirin enteric coated 81 milliGRAM(s) Oral daily  atorvastatin 20 milliGRAM(s) Oral at bedtime  budesonide  80 MICROgram(s)/formoterol 4.5 MICROgram(s) Inhaler 2 Puff(s) Inhalation two times a day  calcium acetate 1334 milliGRAM(s) Oral three times a day with meals  furosemide   Injectable 40 milliGRAM(s) IV Push once  levothyroxine 137 MICROGram(s) Oral daily  metoprolol succinate ER 25 milliGRAM(s) Oral daily  Nephro-lenore 1 Tablet(s) Oral daily  tamsulosin 0.4 milliGRAM(s) Oral at bedtime  tiotropium 2.5 MICROgram(s) Inhaler 2 Puff(s) Inhalation daily    PRN Inpatient Medications  albuterol    90 MICROgram(s) HFA Inhaler 2 Puff(s) Inhalation every 6 hours PRN  aluminum hydroxide/magnesium hydroxide/simethicone Suspension 30 milliLiter(s) Oral every 4 hours PRN  melatonin 3 milliGRAM(s) Oral at bedtime PRN  midodrine. 10 milliGRAM(s) Oral <User Schedule> PRN  ondansetron Injectable 4 milliGRAM(s) IV Push every 8 hours PRN      REVIEW OF SYSTEMS  --------------------------------------------------------------------------------  Gen: No fevers/chills  Skin: No rashes  Head/Eyes/Ears: Normal hearing,   Respiratory: No dyspnea, cough  CV: No chest pain  GI: No abdominal pain, diarrhea  : No dysuria, hematuria  MSK: No  edema  Heme: No easy bruising or bleeding  Psych: No significant depression    All other systems were reviewed and are negative, except as noted.    VITALS/PHYSICAL EXAM  --------------------------------------------------------------------------------  T(C): 36.6 (11-21-23 @ 11:50), Max: 36.6 (11-21-23 @ 11:50)  HR: 73 (11-21-23 @ 11:50) (73 - 73)  BP: 181/78 (11-21-23 @ 11:50) (181/78 - 181/78)  RR: 16 (11-21-23 @ 11:50) (16 - 16)  SpO2: 97% (11-21-23 @ 11:50) (97% - 97%)  Wt(kg): --  Height (cm): 165.1 (11-21-23 @ 11:50)  Weight (kg): 82 (11-21-23 @ 11:50)  BMI (kg/m2): 30.1 (11-21-23 @ 11:50)  BSA (m2): 1.89 (11-21-23 @ 11:50)      Physical Exam:  Gen: NAD  HEENT: Anicteric  Pulm: CTA B/L  CV: S1S2+  Abd: Soft, +BS    Ext: No LE edema B/L  Neuro: Awake  Skin: Warm and dry  Dialysis access: LUE AVF good thrill noted.       LABS/STUDIES  --------------------------------------------------------------------------------    147  |  107  |  33  ----------------------------<  102      [11-21-23 @ 13:50]  6.1   |  30  |  6.58        Ca     9.1     [11-21-23 @ 13:50]            Creatinine Trend:  SCr 6.58 [11-21 @ 13:50]  SCr 6.33 [11-21 @ 12:04]  SCr 5.41 [11-16 @ 07:16]    Urinalysis - [11-21-23 @ 13:50]      Color  / Appearance  / SG  / pH       Gluc 102 / Ketone   / Bili  / Urobili        Blood  / Protein  / Leuk Est  / Nitrite       RBC  / WBC  / Hyaline  / Gran  / Sq Epi  / Non Sq Epi  / Bacteria

## 2023-11-21 NOTE — CONSULT NOTE ADULT - PROBLEM SELECTOR RECOMMENDATION 9
In setting of ESRD. Serum k mildly elevated at 6.4. Pt received Insulin + D50, albuterol Nebs, furosemide and lokelma 10g once. Repeat K was 6.1. His pericardial drain procedure was postponed due to his hyperkalemia.   no EKG changes noted on monitor. Pt is on Lokelma as his home medication.       PLAN:  Betablocker can cause rise in potassium. But continue that for now.   Add lokelma 10g TID   Low K diet.   Recheck the labs tomorrow and we will do his maintenance HD tomorrow.

## 2023-11-21 NOTE — PATIENT PROFILE ADULT - FALL HARM RISK - UNIVERSAL INTERVENTIONS
Bed in lowest position, wheels locked, appropriate side rails in place/Call bell, personal items and telephone in reach/Instruct patient to call for assistance before getting out of bed or chair/Non-slip footwear when patient is out of bed/[place] to call system/Physically safe environment - no spills, clutter or unnecessary equipment/Purposeful Proactive Rounding/Room/bathroom lighting operational, light cord in reach

## 2023-11-21 NOTE — H&P ADULT - PROBLEM SELECTOR PLAN 2
On HD M,W,F. Last session yesterday. With hyperkalemia on labs today. States he makes urine  Nephrologist: Dr. Sandra Monte  - Nephrology consulted  - c/w Phoslo  - c/w midodrine 10mg MWF for dialysis as needed.

## 2023-11-21 NOTE — CONSULT NOTE ADULT - PROBLEM SELECTOR RECOMMENDATION 2
Maintenance HD MWF - [facility] via LtUE AVF.   Consent was obtained and it was placed in charts.   He is on Aluminium containing antacid. In ESRD pts, aluminium accumulation is a worrisome complication. Try to avoid it as much as possible.  C/w Phoslo - home dose.  Mild anemia - 10.9. Asymptomatic. Follow up with out pt HD center for VAN during HD.     If you have any questions, please feel free to contact me  Finn Vance  Nephrology Fellow  [phone]; Prefer Teams.  (After 5pm or on weekends please page the on-call fellow). Maintenance HD MWF - [place] [facility] via LtUE AVF.   Consent was obtained and it was placed in charts.   He is on Aluminium containing antacid. In ESRD pts, aluminium accumulation is a worrisome complication. Try to avoid it as much as possible.  C/w Phoslo - home dose.  Mild anemia - 10.9. Asymptomatic. Follow up with out pt HD center for VAN during HD.     If you have any questions, please feel free to contact me  Finn Vance  Nephrology Fellow  [phone]; Prefer Teams.  (After 5pm or on weekends please page the on-call fellow). Maintenance HD MWF - [place] [facility] via LtUE AVF.   Consent was obtained and it was placed in charts.   He is on Aluminium containing antacid. In ESRD pts, aluminium accumulation is a worrisome complication. Try to avoid it as much as possible.  C/w Phoslo - home dose.  Mild anemia - 10.9. Asymptomatic. Follow up with out pt HD center for VAN during HD.     If you have any questions, please feel free to contact me  Finn Vance  Nephrology Fellow  [phone]; Prefer Teams.  (After 5pm or on weekends please page the on-call fellow).

## 2023-11-21 NOTE — H&P ADULT - HISTORY OF PRESENT ILLNESS
86yo Male with PMHx of HTN, HLD, CAD s/p diagnostic LHC 06/2023 showing severe mLAD disease with severe OM not amendable to stenting, prior LAD + proximal OM stent, PCI/SKYLAR of mLAD x1 (Dr. Wesley) - 08/2023, PVD, LVEF 65-70%, ESRD - on HD diagnosed 6-years ago MWF schedule via AVF left elbow w/ last session 11/20/2023 at [facility] and follows with Dr. Sandra Monte, and anemia presenting to the hospital for pericardial drain. Procedure cancelled 2/2 hyperkalemia. Of note pt was recently admitted for evaluation of worsening CAD w/ CP, SOB, exertional dyspnea. He underwent a LHC and was found to non obstructive CAD. Additionally he was found to have a predominantly posterior pericardial effusion, confirmed on TTE. After discussion between cardiology and IR, the patient is planned for a pericardial window outpatient Tuesday, 11/21 after holding Plavix for 5-days via interventional radiology    86yo Male with PMHx of HTN, HLD, CAD s/p diagnostic LHC 06/2023 showing severe mLAD disease with severe OM not amendable to stenting, prior LAD + proximal OM stent, PCI/SKYLAR of mLAD x1 (Dr. Wesley) - 08/2023, PVD, LVEF 65-70%, ESRD - on HD diagnosed 6-years ago MWF schedule via AVF left elbow w/ last session 11/20/2023 at [facility] and follows with Dr. Sandra Monte, and anemia presenting to the hospital for pericardial drain. Procedure cancelled 2/2 hyperkalemia. States tolerated full 3 hr session of HD yesterday. Of note pt was recently admitted for evaluation of worsening CAD w/ CP, SOB, exertional dyspnea. He underwent a LHC and was found to non obstructive CAD. Additionally he was found to have a predominantly posterior pericardial effusion, confirmed on TTE. After discussion between cardiology and IR, the patient is planned for a pericardial window outpatient Tuesday, 11/21 after holding Plavix for 5-days via interventional radiology

## 2023-11-21 NOTE — H&P ADULT - ASSESSMENT
86yo Male with PMHx of HTN, HLD, CAD s/p diagnostic [facility] 06/2023 showing severe mLAD disease with severe OM not amendable to stenting, prior LAD + proximal OM stent, PCI/SKYLAR of mLAD x1 (Dr. Wesley) - 08/2023, PVD, ESRD - on HD MWF presenting to the hospital for pericardial drain. Procedure cancelled 2/2 hyperkalemia

## 2023-11-21 NOTE — CONSULT NOTE ADULT - ASSESSMENT
Pt with ESRD on HD TIW MWF. Pt still has some residual renal function.        Problem/Recommendation - 1:  ·  Problem: ESRD on hemodialysis.   ·  Recommendation: Pt. with ESRD on HD TIW (MWF). Last outpatient HD was on Wednesday, 6/21/23 via LUE AVF. Pt. clinically stable. Labs reviewed. HD consent obtained from pt, placed in chart. Will arrange for HD tomorrow (6/23). HD orders placed and discussed with RN. Monitor BP and labs.     Pt with ESRD on HD TIW MWF. Pt still has some residual renal function.

## 2023-11-21 NOTE — H&P ADULT - NSHPPHYSICALEXAM_GEN_ALL_CORE
.  VITAL SIGNS:  T(C): 36.6 (11-21-23 @ 11:50), Max: 36.6 (11-21-23 @ 11:50)  T(F): 97.8 (11-21-23 @ 11:50), Max: 97.8 (11-21-23 @ 11:50)  HR: 73 (11-21-23 @ 11:50) (73 - 73)  BP: 181/78 (11-21-23 @ 11:50) (181/78 - 181/78)  BP(mean): --  RR: 16 (11-21-23 @ 11:50) (16 - 16)  SpO2: 97% (11-21-23 @ 11:50) (97% - 97%)  Wt(kg): --    PHYSICAL EXAM:    Constitutional: WDWN resting comfortably in bed; NAD  Head: NC/AT  Eyes: PERRL, EOMI, anicteric sclera  ENT: no nasal discharge; uvula midline, no oropharyngeal erythema or exudates; MMM  Neck: supple; no JVD or thyromegaly  Respiratory: CTA B/L; no W/R/R, no retractions  Cardiac: +S1/S2; RRR; no M/R/G; PMI non-displaced  Gastrointestinal: soft, NT/ND; no rebound or guarding; +BSx4  Extremities: WWP, no clubbing or cyanosis; no peripheral edema  Musculoskeletal: NROM x4; no joint swelling, tenderness or erythema  Vascular: 2+ radial, femoral, DP/PT pulses B/L  Neurologic: AAOx3; CNII-XII grossly intact; no focal deficits  Psychiatric: affect and characteristics of appearance, verbalizations, behaviors are appropriate .  VITAL SIGNS:  T(C): 36.6 (11-21-23 @ 11:50), Max: 36.6 (11-21-23 @ 11:50)  T(F): 97.8 (11-21-23 @ 11:50), Max: 97.8 (11-21-23 @ 11:50)  HR: 73 (11-21-23 @ 11:50) (73 - 73)  BP: 181/78 (11-21-23 @ 11:50) (181/78 - 181/78)  BP(mean): --  RR: 16 (11-21-23 @ 11:50) (16 - 16)  SpO2: 97% (11-21-23 @ 11:50) (97% - 97%)  Wt(kg): --    PHYSICAL EXAM:    Constitutional: WDWN resting comfortably in bed; NAD  Head: NC/AT  Eyes: PERRL, EOMI, anicteric sclera  ENT: no nasal discharge; uvula midline, no oropharyngeal erythema or exudates; MMM  Neck: supple; no JVD or thyromegaly  Respiratory: CTA B/L; no W/R/R, no retractions  Cardiac: +S1/S2; RRR; no M/R/G; PMI non-displaced  Gastrointestinal: soft, NT/ND; no rebound or guarding; +BSx4  Extremities: WWP, no clubbing or cyanosis; trace edema  Musculoskeletal: NROM x4; no joint swelling, tenderness or erythema  Neurologic: AAOx3; CNII-XII grossly intact; no focal deficits  Psychiatric: affect and characteristics of appearance, verbalizations, behaviors are appropriate

## 2023-11-21 NOTE — H&P ADULT - NSHPLABSRESULTS_GEN_ALL_CORE
.  LABS:     11-21    144  |  103  |  31<H>  ----------------------------<  109<H>  6.4<HH>   |  29  |  6.33<H>    Ca    9.1      21 Nov 2023 12:04        Urinalysis Basic - ( 21 Nov 2023 12:04 )    Color: x / Appearance: x / SG: x / pH: x  Gluc: 109 mg/dL / Ketone: x  / Bili: x / Urobili: x   Blood: x / Protein: x / Nitrite: x   Leuk Esterase: x / RBC: x / WBC x   Sq Epi: x / Non Sq Epi: x / Bacteria: x                RADIOLOGY, EKG & ADDITIONAL TESTS: Reviewed.

## 2023-11-21 NOTE — CHART NOTE - NSCHARTNOTEFT_GEN_A_CORE
PMHx CAD s/p stenting August 2023 (on ASA/plavix) & ESRD on HD (last 11/20/23) presenting for non-emergent CT guided drainage of pericardial effusion noted on 11/16/23 after repeat cardiac catheterization. Patient discharged home on 11/16 post-catheterization to hold plavix & continue ASA & return today for planned elective outpatient drainage followed by admission 24hr observation.     Initial VBG today w/ potassium 5.9; repeat potassium 6.4 on BMP. Discussed w/ IR attending, as procedure non-emergent, will admit patient, consult nephrology & optimize electrolytes for procedure tomorrow. PMHx CAD s/p SKYLAR to mLAD 6/22/2023 (discharged on ASA/plavix) & ESRD on HD (last 11/20/23) presenting as outpatient for non-emergent CT guided drainage of pericardial effusion noted on 11/16/23 after repeat cardiac catheterization. Patient discharged home on 11/16 post-catheterization to hold plavix & continue ASA & return today for planned elective outpatient drainage followed by admission 24hr observation.     Initial VBG today w/ potassium 5.9; repeat potassium 6.4 on BMP. Discussed w/ IR attending, as procedure non-emergent, will admit patient, consult nephrology & optimize electrolytes for procedure tomorrow.

## 2023-11-21 NOTE — H&P ADULT - PROBLEM SELECTOR PLAN 1
Potassium 6.6 prior to procedure. Pt tolerated full session of HD yesterday, stated 3 hr session.   - Will repeat BMP  - If potassium remains elevated will give insulin/D50, albuterol, Lokelma and lasix  - Continue to trend BMP  - Nephro consulted for HD Potassium 6.4 prior to procedure. Pt tolerated full session of HD yesterday, stated 3 hr session.   - Will repeat BMP  - If potassium remains elevated will give insulin/D50, albuterol, Lokelma and lasix  - Continue to trend BMP  - Nephro consulted for HD

## 2023-11-21 NOTE — H&P ADULT - PROBLEM SELECTOR PLAN 3
New posterior pericardial effusion seen while getting [facility] (11/16). Unknown etiology as of yet  - Hemodynamically stable  - Continue to hold Plavix. Will hold DVT ppx as well.   - f/u pericardial fluid studies

## 2023-11-21 NOTE — CHART NOTE - NSCHARTNOTEFT_GEN_A_CORE
86yo Male with PMHx of HTN, HLD, CAD s/p diagnostic LHC 06/2023 showing severe mLAD disease with severe OM not amendable to stenting, prior LAD + proximal OM stent, PCI/SKYLAR of mLAD x1 (Dr. Wesley) - 08/2023, PVD, LVEF 65-70%, ESRD - on HD diagnosed 6-years ago MWF schedule via AVF left elbow w/ last session 11/20/2023 at [facility] and follows with Dr. Sandra Monte, and anemia presenting to the hospital for pericardial drain. Procedure cancelled 2/2 hyperkalemia. Of note pt was recently admitted for evaluation of worsening CAD w/ CP, SOB, exertional dyspnea. He underwent a LHC and was found to non obstructive CAD. Additionally he was found to have a predominantly posterior pericardial effusion, confirmed on TTE. After discussion between cardiology and IR, the patient is planned for a pericardial window outpatient Tuesday, 11/21 after holding Plavix for 5-days via interventional radiology.     -patient presented to IR for planned pericardial drain placement. STAT BMP with K of 6.4. Spoke with patient via [place] phone  Kianna ID #[id number]. Per d/w patient underwent HD via LUE AVF x 3hrs. Case d/w Dr. garcia and anesthesia, plan for admission for medical optimization and pericardial drainage tomorrow pending medical optimization.    -Spoke with Admitting hospitalist Dr. Gomez, plan to have renal evaluate patient.

## 2023-11-21 NOTE — H&P ADULT - PROBLEM SELECTOR PLAN 5
Not in exacerbation at present  - c/w froy prn  - Treligy therapeutic interchange as is non-formularly

## 2023-11-22 DIAGNOSIS — Z29.9 ENCOUNTER FOR PROPHYLACTIC MEASURES, UNSPECIFIED: ICD-10-CM

## 2023-11-22 LAB
ANION GAP SERPL CALC-SCNC: 14 MMOL/L — SIGNIFICANT CHANGE UP (ref 5–17)
ANION GAP SERPL CALC-SCNC: 14 MMOL/L — SIGNIFICANT CHANGE UP (ref 5–17)
BUN SERPL-MCNC: 45 MG/DL — HIGH (ref 7–23)
BUN SERPL-MCNC: 45 MG/DL — HIGH (ref 7–23)
CALCIUM SERPL-MCNC: 8.5 MG/DL — SIGNIFICANT CHANGE UP (ref 8.4–10.5)
CALCIUM SERPL-MCNC: 8.5 MG/DL — SIGNIFICANT CHANGE UP (ref 8.4–10.5)
CHLORIDE SERPL-SCNC: 100 MMOL/L — SIGNIFICANT CHANGE UP (ref 96–108)
CHLORIDE SERPL-SCNC: 100 MMOL/L — SIGNIFICANT CHANGE UP (ref 96–108)
CO2 SERPL-SCNC: 28 MMOL/L — SIGNIFICANT CHANGE UP (ref 22–31)
CO2 SERPL-SCNC: 28 MMOL/L — SIGNIFICANT CHANGE UP (ref 22–31)
CREAT SERPL-MCNC: 8.44 MG/DL — HIGH (ref 0.5–1.3)
CREAT SERPL-MCNC: 8.44 MG/DL — HIGH (ref 0.5–1.3)
EGFR: 6 ML/MIN/1.73M2 — LOW
EGFR: 6 ML/MIN/1.73M2 — LOW
GLUCOSE BLDC GLUCOMTR-MCNC: 113 MG/DL — HIGH (ref 70–99)
GLUCOSE BLDC GLUCOMTR-MCNC: 113 MG/DL — HIGH (ref 70–99)
GLUCOSE BLDC GLUCOMTR-MCNC: 150 MG/DL — HIGH (ref 70–99)
GLUCOSE BLDC GLUCOMTR-MCNC: 150 MG/DL — HIGH (ref 70–99)
GLUCOSE SERPL-MCNC: 87 MG/DL — SIGNIFICANT CHANGE UP (ref 70–99)
GLUCOSE SERPL-MCNC: 87 MG/DL — SIGNIFICANT CHANGE UP (ref 70–99)
HBV SURFACE AG SER-ACNC: SIGNIFICANT CHANGE UP
HBV SURFACE AG SER-ACNC: SIGNIFICANT CHANGE UP
HCT VFR BLD CALC: 29.8 % — LOW (ref 39–50)
HCT VFR BLD CALC: 29.8 % — LOW (ref 39–50)
HGB BLD-MCNC: 9.2 G/DL — LOW (ref 13–17)
HGB BLD-MCNC: 9.2 G/DL — LOW (ref 13–17)
INR BLD: 1.13 RATIO — SIGNIFICANT CHANGE UP (ref 0.85–1.18)
INR BLD: 1.13 RATIO — SIGNIFICANT CHANGE UP (ref 0.85–1.18)
MAGNESIUM SERPL-MCNC: 2.4 MG/DL — SIGNIFICANT CHANGE UP (ref 1.6–2.6)
MAGNESIUM SERPL-MCNC: 2.4 MG/DL — SIGNIFICANT CHANGE UP (ref 1.6–2.6)
MCHC RBC-ENTMCNC: 30.9 GM/DL — LOW (ref 32–36)
MCHC RBC-ENTMCNC: 30.9 GM/DL — LOW (ref 32–36)
MCHC RBC-ENTMCNC: 31.5 PG — SIGNIFICANT CHANGE UP (ref 27–34)
MCHC RBC-ENTMCNC: 31.5 PG — SIGNIFICANT CHANGE UP (ref 27–34)
MCV RBC AUTO: 102.1 FL — HIGH (ref 80–100)
MCV RBC AUTO: 102.1 FL — HIGH (ref 80–100)
NRBC # BLD: 0 /100 WBCS — SIGNIFICANT CHANGE UP (ref 0–0)
NRBC # BLD: 0 /100 WBCS — SIGNIFICANT CHANGE UP (ref 0–0)
PHOSPHATE SERPL-MCNC: 4.8 MG/DL — HIGH (ref 2.5–4.5)
PHOSPHATE SERPL-MCNC: 4.8 MG/DL — HIGH (ref 2.5–4.5)
PLATELET # BLD AUTO: 211 K/UL — SIGNIFICANT CHANGE UP (ref 150–400)
PLATELET # BLD AUTO: 211 K/UL — SIGNIFICANT CHANGE UP (ref 150–400)
POTASSIUM SERPL-MCNC: 5.7 MMOL/L — HIGH (ref 3.5–5.3)
POTASSIUM SERPL-MCNC: 5.7 MMOL/L — HIGH (ref 3.5–5.3)
POTASSIUM SERPL-SCNC: 5.7 MMOL/L — HIGH (ref 3.5–5.3)
POTASSIUM SERPL-SCNC: 5.7 MMOL/L — HIGH (ref 3.5–5.3)
PROTHROM AB SERPL-ACNC: 11.8 SEC — SIGNIFICANT CHANGE UP (ref 9.5–13)
PROTHROM AB SERPL-ACNC: 11.8 SEC — SIGNIFICANT CHANGE UP (ref 9.5–13)
RBC # BLD: 2.92 M/UL — LOW (ref 4.2–5.8)
RBC # BLD: 2.92 M/UL — LOW (ref 4.2–5.8)
RBC # FLD: 15.4 % — HIGH (ref 10.3–14.5)
RBC # FLD: 15.4 % — HIGH (ref 10.3–14.5)
SODIUM SERPL-SCNC: 142 MMOL/L — SIGNIFICANT CHANGE UP (ref 135–145)
SODIUM SERPL-SCNC: 142 MMOL/L — SIGNIFICANT CHANGE UP (ref 135–145)
WBC # BLD: 5.31 K/UL — SIGNIFICANT CHANGE UP (ref 3.8–10.5)
WBC # BLD: 5.31 K/UL — SIGNIFICANT CHANGE UP (ref 3.8–10.5)
WBC # FLD AUTO: 5.31 K/UL — SIGNIFICANT CHANGE UP (ref 3.8–10.5)
WBC # FLD AUTO: 5.31 K/UL — SIGNIFICANT CHANGE UP (ref 3.8–10.5)

## 2023-11-22 PROCEDURE — 99233 SBSQ HOSP IP/OBS HIGH 50: CPT

## 2023-11-22 PROCEDURE — 99232 SBSQ HOSP IP/OBS MODERATE 35: CPT | Mod: GC

## 2023-11-22 RX ORDER — IBUPROFEN 200 MG
200 TABLET ORAL ONCE
Refills: 0 | Status: COMPLETED | OUTPATIENT
Start: 2023-11-22 | End: 2023-11-22

## 2023-11-22 RX ADMIN — Medication 200 MILLIGRAM(S): at 16:52

## 2023-11-22 RX ADMIN — SODIUM ZIRCONIUM CYCLOSILICATE 10 GRAM(S): 10 POWDER, FOR SUSPENSION ORAL at 22:06

## 2023-11-22 RX ADMIN — BUDESONIDE AND FORMOTEROL FUMARATE DIHYDRATE 2 PUFF(S): 160; 4.5 AEROSOL RESPIRATORY (INHALATION) at 18:24

## 2023-11-22 RX ADMIN — Medication 200 MILLIGRAM(S): at 17:58

## 2023-11-22 RX ADMIN — BUDESONIDE AND FORMOTEROL FUMARATE DIHYDRATE 2 PUFF(S): 160; 4.5 AEROSOL RESPIRATORY (INHALATION) at 05:24

## 2023-11-22 RX ADMIN — Medication 137 MICROGRAM(S): at 05:22

## 2023-11-22 RX ADMIN — SODIUM ZIRCONIUM CYCLOSILICATE 10 GRAM(S): 10 POWDER, FOR SUSPENSION ORAL at 05:22

## 2023-11-22 RX ADMIN — Medication 25 MILLIGRAM(S): at 05:23

## 2023-11-22 RX ADMIN — ATORVASTATIN CALCIUM 20 MILLIGRAM(S): 80 TABLET, FILM COATED ORAL at 22:06

## 2023-11-22 RX ADMIN — TIOTROPIUM BROMIDE 2 PUFF(S): 18 CAPSULE ORAL; RESPIRATORY (INHALATION) at 12:10

## 2023-11-22 RX ADMIN — TAMSULOSIN HYDROCHLORIDE 0.4 MILLIGRAM(S): 0.4 CAPSULE ORAL at 22:06

## 2023-11-22 NOTE — PROGRESS NOTE ADULT - PROBLEM SELECTOR PLAN 2
Maintenance HD MWF - [place] [facility] via LtUE AVF.   Consent was obtained and it was placed in charts.   He is on Aluminium containing antacid. In ESRD pts, aluminium accumulation is a worrisome complication. Try to avoid it as much as possible.  C/w Phoslo - home dose.  Mild anemia - 10.9. Asymptomatic. Follow up with out pt HD center for VAN during HD.     If you have any questions, please feel free to contact me  Finn Vance  Nephrology Fellow  [phone]; Prefer Teams.  (After 5pm or on weekends please page the on-call fellow).

## 2023-11-22 NOTE — PROGRESS NOTE ADULT - SUBJECTIVE AND OBJECTIVE BOX
Angelina Davis MD  Hospitalist  Available on MS Teams      PROGRESS NOTE:     Patient is a 87y old  Male who presents with a chief complaint of Pericardial drain (22 Nov 2023 14:43)      SUBJECTIVE / OVERNIGHT EVENTS:  Pt this morning c/o headache. States he has an intolerance with acetaminophen, takes motrin at home.     MEDICATIONS  (STANDING):  aspirin enteric coated 81 milliGRAM(s) Oral daily  atorvastatin 20 milliGRAM(s) Oral at bedtime  budesonide  80 MICROgram(s)/formoterol 4.5 MICROgram(s) Inhaler 2 Puff(s) Inhalation two times a day  calcium acetate 1334 milliGRAM(s) Oral three times a day with meals  levothyroxine 137 MICROGram(s) Oral daily  metoprolol succinate ER 25 milliGRAM(s) Oral daily  Nephro-lneore 1 Tablet(s) Oral daily  sodium zirconium cyclosilicate 10 Gram(s) Oral three times a day  tamsulosin 0.4 milliGRAM(s) Oral at bedtime  tiotropium 2.5 MICROgram(s) Inhaler 2 Puff(s) Inhalation daily    MEDICATIONS  (PRN):  albuterol    90 MICROgram(s) HFA Inhaler 2 Puff(s) Inhalation every 6 hours PRN Shortness of Breath and/or Wheezing  melatonin 3 milliGRAM(s) Oral at bedtime PRN Insomnia  midodrine. 10 milliGRAM(s) Oral <User Schedule> PRN Low BP prior to HD  ondansetron Injectable 4 milliGRAM(s) IV Push every 8 hours PRN Nausea and/or Vomiting      CAPILLARY BLOOD GLUCOSE      POCT Blood Glucose.: 113 mg/dL (22 Nov 2023 12:21)  POCT Blood Glucose.: 177 mg/dL (21 Nov 2023 21:41)  POCT Blood Glucose.: 80 mg/dL (21 Nov 2023 16:47)    I&O's Summary    21 Nov 2023 07:01  -  22 Nov 2023 07:00  --------------------------------------------------------  IN: 240 mL / OUT: 0 mL / NET: 240 mL        PHYSICAL EXAM:  Vital Signs Last 24 Hrs  T(C): 36.7 (22 Nov 2023 14:17), Max: 37.1 (21 Nov 2023 15:39)  T(F): 98.1 (22 Nov 2023 14:17), Max: 98.7 (21 Nov 2023 15:39)  HR: 63 (22 Nov 2023 14:17) (58 - 77)  BP: 170/73 (22 Nov 2023 14:17) (117/63 - 170/73)  BP(mean): --  RR: 18 (22 Nov 2023 14:17) (16 - 18)  SpO2: 94% (22 Nov 2023 14:17) (94% - 99%)    Parameters below as of 22 Nov 2023 14:17  Patient On (Oxygen Delivery Method): room air    CONSTITUTIONAL: Well-developed, well-groomed, in no apparent distress  RESPIRATORY: Breathing comfortably; lungs CTA without wheeze/rhonchi/rales  CARDIOVASCULAR: +S1S2, RRR, no M/G/R; no lower extremity edema  VASCULAR: +left upper extremity fistula with palpable thrill  GASTROINTESTINAL: No palpable masses or tenderness  NEUROLOGIC: moves all extremities spontaneously  PSYCHIATRIC: A+O x 4; mood and affect appropriate; appropriate insight and judgment    LABS:                        9.2    5.31  )-----------( 211      ( 22 Nov 2023 07:24 )             29.8     11-22    142  |  100  |  45<H>  ----------------------------<  87  5.7<H>   |  28  |  8.44<H>    Ca    8.5      22 Nov 2023 07:21  Phos  4.8     11-22  Mg     2.4     11-22      PT/INR - ( 22 Nov 2023 07:25 )   PT: 11.8 sec;   INR: 1.13 ratio               Urinalysis Basic - ( 22 Nov 2023 07:21 )    Color: x / Appearance: x / SG: x / pH: x  Gluc: 87 mg/dL / Ketone: x  / Bili: x / Urobili: x   Blood: x / Protein: x / Nitrite: x   Leuk Esterase: x / RBC: x / WBC x   Sq Epi: x / Non Sq Epi: x / Bacteria: x

## 2023-11-22 NOTE — PROGRESS NOTE ADULT - ASSESSMENT
87 year old male with PMHx of HTN, HLD, CAD s/p diagnostic [facility] 06/2023 showing severe mLAD disease with severe OM not amendable to stenting, prior LAD + proximal OM stent, PCI/SKYLAR of mLAD x1 (Dr. Wesley) - 08/2023, PVD, ESRD - on HD MWF who initially presented for ambulatory pericardial drain. Procedure was cancelled due to hyperkalemia to 6.4, and the patient was admitted for further care.

## 2023-11-22 NOTE — PROGRESS NOTE ADULT - PROBLEM SELECTOR PLAN 2
On HD M,W,F.  Nephrologist: Dr. Sandra Monte  - Nephrology consulted  - c/w Phoslo  - c/w midodrine 10mg MWF for dialysis as needed.

## 2023-11-22 NOTE — PROGRESS NOTE ADULT - PROBLEM SELECTOR PLAN 1
Potassium 6.4 prior to procedure. No hx of missed HD sessions.   - Continue to trend BMP  - plan for HD today, regularly scheduled  - K today is 5.7 prior to dialysis  - Nephro is onboard

## 2023-11-22 NOTE — PROGRESS NOTE ADULT - PROBLEM SELECTOR PLAN 3
New posterior pericardial effusion seen while getting [facility] (11/16). Unknown etiology as of yet  - Hemodynamically stable  - Continue to hold Plavix. Will hold DVT ppx as well.   - f/u pericardial fluid studies

## 2023-11-22 NOTE — PROGRESS NOTE ADULT - SUBJECTIVE AND OBJECTIVE BOX
[facility] Division of Kidney Diseases & Hypertension  FOLLOW UP NOTE  [phone]--------------------------------------------------------------------------------  Chief Complaint:ST elevation myocardial infarction (STEMI)        24 hour events/subjective: No acute overnight events. No new complaints. Pt was kept NPO for possible IR guided pericardial drain.           PAST HISTORY  --------------------------------------------------------------------------------  No significant changes to PMH, PSH, FHx, SHx, unless otherwise noted    ALLERGIES & MEDICATIONS  --------------------------------------------------------------------------------  Allergies    No Known Allergies    Intolerances    acetaminophen (Other (Mild))    Standing Inpatient Medications  aspirin enteric coated 81 milliGRAM(s) Oral daily  atorvastatin 20 milliGRAM(s) Oral at bedtime  budesonide  80 MICROgram(s)/formoterol 4.5 MICROgram(s) Inhaler 2 Puff(s) Inhalation two times a day  calcium acetate 1334 milliGRAM(s) Oral three times a day with meals  levothyroxine 137 MICROGram(s) Oral daily  metoprolol succinate ER 25 milliGRAM(s) Oral daily  Nephro-lenore 1 Tablet(s) Oral daily  sodium zirconium cyclosilicate 10 Gram(s) Oral three times a day  tamsulosin 0.4 milliGRAM(s) Oral at bedtime  tiotropium 2.5 MICROgram(s) Inhaler 2 Puff(s) Inhalation daily    PRN Inpatient Medications  albuterol    90 MICROgram(s) HFA Inhaler 2 Puff(s) Inhalation every 6 hours PRN  melatonin 3 milliGRAM(s) Oral at bedtime PRN  midodrine. 10 milliGRAM(s) Oral <User Schedule> PRN  ondansetron Injectable 4 milliGRAM(s) IV Push every 8 hours PRN      REVIEW OF SYSTEMS  --------------------------------------------------------------------------------  Gen: No  fevers/chills  Skin: No rashes  Head/Eyes/Ears/Mouth: No headache; Normal hearing; Normal vision w/o blurriness  Respiratory: No dyspnea, cough, wheezing, hemoptysis  CV: No chest pain, PND, orthopnea  GI: No abdominal pain, diarrhea, constipation, nausea, vomiting  : No increased frequency, dysuria, hematuria, nocturia  MSK: No joint pain/swelling; no back pain; no edema  Neuro: No dizziness/lightheadedness, weakness, seizures, numbness, tingling      All other systems were reviewed and are negative, except as noted.    VITALS/PHYSICAL EXAM  --------------------------------------------------------------------------------  T(C): 36.7 (11-22-23 @ 14:17), Max: 37.1 (11-21-23 @ 15:39)  HR: 63 (11-22-23 @ 14:17) (58 - 77)  BP: 170/73 (11-22-23 @ 14:17) (117/63 - 170/73)  RR: 18 (11-22-23 @ 14:17) (16 - 18)  SpO2: 94% (11-22-23 @ 14:17) (94% - 99%)  Wt(kg): --  Height (cm): 165.1 (11-21-23 @ 11:50)  Weight (kg): 82 (11-21-23 @ 11:50)  BMI (kg/m2): 30.1 (11-21-23 @ 11:50)  BSA (m2): 1.89 (11-21-23 @ 11:50)      11-21-23 @ 07:01  -  11-22-23 @ 07:00  --------------------------------------------------------  IN: 240 mL / OUT: 0 mL / NET: 240 mL      Physical Exam:  Gen: NAD  HEENT: Anicteric  Pulm: CTA B/L  CV: S1S2+  Abd: Soft, +BS    Ext: No LE edema B/L  Neuro: Awake  Skin: Warm and dry  Dialysis access: ANGELINA RAZA     LABS/STUDIES  --------------------------------------------------------------------------------              9.2    5.31  >-----------<  211      [11-22-23 @ 07:24]              29.8     142  |  100  |  45  ----------------------------<  87      [11-22-23 @ 07:21]  5.7   |  28  |  8.44        Ca     8.5     [11-22-23 @ 07:21]      Mg     2.4     [11-22-23 @ 07:21]      Phos  4.8     [11-22-23 @ 07:21]      PT/INR: PT 11.8 , INR 1.13       [11-22-23 @ 07:25]      Creatinine Trend:  SCr 8.44 [11-22 @ 07:21]  SCr 6.98 [11-21 @ 16:54]  SCr 6.58 [11-21 @ 13:50]  SCr 6.33 [11-21 @ 12:04]  SCr 5.41 [11-16 @ 07:16]    Urinalysis - [11-22-23 @ 07:21]      Color  / Appearance  / SG  / pH       Gluc 87 / Ketone   / Bili  / Urobili        Blood  / Protein  / Leuk Est  / Nitrite       RBC  / WBC  / Hyaline  / Gran  / Sq Epi  / Non Sq Epi  / Bacteria         HBsAg Nonreact      [11-22-23 @ 10:05]

## 2023-11-23 LAB
ANION GAP SERPL CALC-SCNC: 14 MMOL/L — SIGNIFICANT CHANGE UP (ref 5–17)
ANION GAP SERPL CALC-SCNC: 14 MMOL/L — SIGNIFICANT CHANGE UP (ref 5–17)
ANION GAP SERPL CALC-SCNC: 16 MMOL/L — SIGNIFICANT CHANGE UP (ref 5–17)
ANION GAP SERPL CALC-SCNC: 16 MMOL/L — SIGNIFICANT CHANGE UP (ref 5–17)
BUN SERPL-MCNC: 34 MG/DL — HIGH (ref 7–23)
CALCIUM SERPL-MCNC: 8.2 MG/DL — LOW (ref 8.4–10.5)
CALCIUM SERPL-MCNC: 8.2 MG/DL — LOW (ref 8.4–10.5)
CALCIUM SERPL-MCNC: 8.5 MG/DL — SIGNIFICANT CHANGE UP (ref 8.4–10.5)
CALCIUM SERPL-MCNC: 8.5 MG/DL — SIGNIFICANT CHANGE UP (ref 8.4–10.5)
CHLORIDE SERPL-SCNC: 96 MMOL/L — SIGNIFICANT CHANGE UP (ref 96–108)
CHLORIDE SERPL-SCNC: 96 MMOL/L — SIGNIFICANT CHANGE UP (ref 96–108)
CHLORIDE SERPL-SCNC: 97 MMOL/L — SIGNIFICANT CHANGE UP (ref 96–108)
CHLORIDE SERPL-SCNC: 97 MMOL/L — SIGNIFICANT CHANGE UP (ref 96–108)
CO2 SERPL-SCNC: 28 MMOL/L — SIGNIFICANT CHANGE UP (ref 22–31)
CO2 SERPL-SCNC: 28 MMOL/L — SIGNIFICANT CHANGE UP (ref 22–31)
CO2 SERPL-SCNC: 29 MMOL/L — SIGNIFICANT CHANGE UP (ref 22–31)
CO2 SERPL-SCNC: 29 MMOL/L — SIGNIFICANT CHANGE UP (ref 22–31)
CREAT SERPL-MCNC: 6.11 MG/DL — HIGH (ref 0.5–1.3)
CREAT SERPL-MCNC: 6.11 MG/DL — HIGH (ref 0.5–1.3)
CREAT SERPL-MCNC: 6.18 MG/DL — HIGH (ref 0.5–1.3)
CREAT SERPL-MCNC: 6.18 MG/DL — HIGH (ref 0.5–1.3)
EGFR: 8 ML/MIN/1.73M2 — LOW
GLUCOSE BLDC GLUCOMTR-MCNC: 118 MG/DL — HIGH (ref 70–99)
GLUCOSE BLDC GLUCOMTR-MCNC: 118 MG/DL — HIGH (ref 70–99)
GLUCOSE SERPL-MCNC: 91 MG/DL — SIGNIFICANT CHANGE UP (ref 70–99)
GLUCOSE SERPL-MCNC: 91 MG/DL — SIGNIFICANT CHANGE UP (ref 70–99)
GLUCOSE SERPL-MCNC: 94 MG/DL — SIGNIFICANT CHANGE UP (ref 70–99)
GLUCOSE SERPL-MCNC: 94 MG/DL — SIGNIFICANT CHANGE UP (ref 70–99)
HCT VFR BLD CALC: 31 % — LOW (ref 39–50)
HCT VFR BLD CALC: 31 % — LOW (ref 39–50)
HGB BLD-MCNC: 9.7 G/DL — LOW (ref 13–17)
HGB BLD-MCNC: 9.7 G/DL — LOW (ref 13–17)
MAGNESIUM SERPL-MCNC: 2.1 MG/DL — SIGNIFICANT CHANGE UP (ref 1.6–2.6)
MAGNESIUM SERPL-MCNC: 2.1 MG/DL — SIGNIFICANT CHANGE UP (ref 1.6–2.6)
MCHC RBC-ENTMCNC: 31.3 GM/DL — LOW (ref 32–36)
MCHC RBC-ENTMCNC: 31.3 GM/DL — LOW (ref 32–36)
MCHC RBC-ENTMCNC: 31.5 PG — SIGNIFICANT CHANGE UP (ref 27–34)
MCHC RBC-ENTMCNC: 31.5 PG — SIGNIFICANT CHANGE UP (ref 27–34)
MCV RBC AUTO: 100.6 FL — HIGH (ref 80–100)
MCV RBC AUTO: 100.6 FL — HIGH (ref 80–100)
NRBC # BLD: 0 /100 WBCS — SIGNIFICANT CHANGE UP (ref 0–0)
NRBC # BLD: 0 /100 WBCS — SIGNIFICANT CHANGE UP (ref 0–0)
PHOSPHATE SERPL-MCNC: 5 MG/DL — HIGH (ref 2.5–4.5)
PHOSPHATE SERPL-MCNC: 5 MG/DL — HIGH (ref 2.5–4.5)
PLATELET # BLD AUTO: 224 K/UL — SIGNIFICANT CHANGE UP (ref 150–400)
PLATELET # BLD AUTO: 224 K/UL — SIGNIFICANT CHANGE UP (ref 150–400)
POTASSIUM SERPL-MCNC: 4.2 MMOL/L — SIGNIFICANT CHANGE UP (ref 3.5–5.3)
POTASSIUM SERPL-MCNC: 4.2 MMOL/L — SIGNIFICANT CHANGE UP (ref 3.5–5.3)
POTASSIUM SERPL-MCNC: 4.3 MMOL/L — SIGNIFICANT CHANGE UP (ref 3.5–5.3)
POTASSIUM SERPL-MCNC: 4.3 MMOL/L — SIGNIFICANT CHANGE UP (ref 3.5–5.3)
POTASSIUM SERPL-SCNC: 4.2 MMOL/L — SIGNIFICANT CHANGE UP (ref 3.5–5.3)
POTASSIUM SERPL-SCNC: 4.2 MMOL/L — SIGNIFICANT CHANGE UP (ref 3.5–5.3)
POTASSIUM SERPL-SCNC: 4.3 MMOL/L — SIGNIFICANT CHANGE UP (ref 3.5–5.3)
POTASSIUM SERPL-SCNC: 4.3 MMOL/L — SIGNIFICANT CHANGE UP (ref 3.5–5.3)
RBC # BLD: 3.08 M/UL — LOW (ref 4.2–5.8)
RBC # BLD: 3.08 M/UL — LOW (ref 4.2–5.8)
RBC # FLD: 15.1 % — HIGH (ref 10.3–14.5)
RBC # FLD: 15.1 % — HIGH (ref 10.3–14.5)
SODIUM SERPL-SCNC: 140 MMOL/L — SIGNIFICANT CHANGE UP (ref 135–145)
WBC # BLD: 4.88 K/UL — SIGNIFICANT CHANGE UP (ref 3.8–10.5)
WBC # BLD: 4.88 K/UL — SIGNIFICANT CHANGE UP (ref 3.8–10.5)
WBC # FLD AUTO: 4.88 K/UL — SIGNIFICANT CHANGE UP (ref 3.8–10.5)
WBC # FLD AUTO: 4.88 K/UL — SIGNIFICANT CHANGE UP (ref 3.8–10.5)

## 2023-11-23 PROCEDURE — 99232 SBSQ HOSP IP/OBS MODERATE 35: CPT

## 2023-11-23 RX ADMIN — BUDESONIDE AND FORMOTEROL FUMARATE DIHYDRATE 2 PUFF(S): 160; 4.5 AEROSOL RESPIRATORY (INHALATION) at 17:25

## 2023-11-23 RX ADMIN — ATORVASTATIN CALCIUM 20 MILLIGRAM(S): 80 TABLET, FILM COATED ORAL at 21:24

## 2023-11-23 RX ADMIN — Medication 1334 MILLIGRAM(S): at 11:49

## 2023-11-23 RX ADMIN — Medication 137 MICROGRAM(S): at 05:20

## 2023-11-23 RX ADMIN — Medication 1334 MILLIGRAM(S): at 08:19

## 2023-11-23 RX ADMIN — Medication 1334 MILLIGRAM(S): at 17:24

## 2023-11-23 RX ADMIN — SODIUM ZIRCONIUM CYCLOSILICATE 10 GRAM(S): 10 POWDER, FOR SUSPENSION ORAL at 15:29

## 2023-11-23 RX ADMIN — Medication 1 TABLET(S): at 11:49

## 2023-11-23 RX ADMIN — BUDESONIDE AND FORMOTEROL FUMARATE DIHYDRATE 2 PUFF(S): 160; 4.5 AEROSOL RESPIRATORY (INHALATION) at 05:20

## 2023-11-23 RX ADMIN — TIOTROPIUM BROMIDE 2 PUFF(S): 18 CAPSULE ORAL; RESPIRATORY (INHALATION) at 11:50

## 2023-11-23 RX ADMIN — SODIUM ZIRCONIUM CYCLOSILICATE 10 GRAM(S): 10 POWDER, FOR SUSPENSION ORAL at 05:20

## 2023-11-23 RX ADMIN — Medication 25 MILLIGRAM(S): at 05:20

## 2023-11-23 RX ADMIN — TAMSULOSIN HYDROCHLORIDE 0.4 MILLIGRAM(S): 0.4 CAPSULE ORAL at 21:24

## 2023-11-23 RX ADMIN — Medication 81 MILLIGRAM(S): at 11:49

## 2023-11-23 NOTE — PROGRESS NOTE ADULT - PROBLEM SELECTOR PLAN 2
IMPROVED  Potassium 6.4 prior to procedure. No hx of missed HD sessions.   - Continue to trend BMP  - S/p HD on 11/22, now potassium is normalized  - Nephro is onboard, appreciate evaluation

## 2023-11-23 NOTE — PROGRESS NOTE ADULT - PROBLEM SELECTOR PLAN 1
New posterior pericardial effusion seen while getting [facility] (11/16). Unknown etiology as of yet  - Hemodynamically stable  - Continue to hold Plavix. Will hold DVT ppx as well.   - Will reach out to IR tomorrow for pericardial drain  - f/u pericardial fluid studies

## 2023-11-23 NOTE — PROGRESS NOTE ADULT - SUBJECTIVE AND OBJECTIVE BOX
Angelina Davis MD  Hospitalist  Available on MS Teams      PROGRESS NOTE:     Patient is a 87y old  Male who presents with a chief complaint of Pericardial drain (22 Nov 2023 15:32)      SUBJECTIVE / OVERNIGHT EVENTS:  [employer]  [id number]    No acute events overnight.   Pt seen today. C/o generalized fatigue. Otherwise no acute complaints.    Tele: Sinus 60s-70s.    MEDICATIONS  (STANDING):  aspirin enteric coated 81 milliGRAM(s) Oral daily  atorvastatin 20 milliGRAM(s) Oral at bedtime  budesonide  80 MICROgram(s)/formoterol 4.5 MICROgram(s) Inhaler 2 Puff(s) Inhalation two times a day  calcium acetate 1334 milliGRAM(s) Oral three times a day with meals  levothyroxine 137 MICROGram(s) Oral daily  metoprolol succinate ER 25 milliGRAM(s) Oral daily  Nephro-lenore 1 Tablet(s) Oral daily  sodium zirconium cyclosilicate 10 Gram(s) Oral three times a day  tamsulosin 0.4 milliGRAM(s) Oral at bedtime  tiotropium 2.5 MICROgram(s) Inhaler 2 Puff(s) Inhalation daily    MEDICATIONS  (PRN):  albuterol    90 MICROgram(s) HFA Inhaler 2 Puff(s) Inhalation every 6 hours PRN Shortness of Breath and/or Wheezing  melatonin 3 milliGRAM(s) Oral at bedtime PRN Insomnia  midodrine. 10 milliGRAM(s) Oral <User Schedule> PRN Low BP prior to HD  ondansetron Injectable 4 milliGRAM(s) IV Push every 8 hours PRN Nausea and/or Vomiting      CAPILLARY BLOOD GLUCOSE      POCT Blood Glucose.: 118 mg/dL (23 Nov 2023 12:31)  POCT Blood Glucose.: 150 mg/dL (22 Nov 2023 21:43)    I&O's Summary    22 Nov 2023 07:01  -  23 Nov 2023 07:00  --------------------------------------------------------  IN: 240 mL / OUT: 2000 mL / NET: -1760 mL        PHYSICAL EXAM:  Vital Signs Last 24 Hrs  T(C): 37 (23 Nov 2023 12:08), Max: 37 (23 Nov 2023 12:08)  T(F): 98.6 (23 Nov 2023 12:08), Max: 98.6 (23 Nov 2023 12:08)  HR: 65 (23 Nov 2023 12:08) (63 - 67)  BP: 135/66 (23 Nov 2023 12:08) (126/52 - 141/66)  BP(mean): --  RR: 18 (23 Nov 2023 12:08) (18 - 18)  SpO2: 93% (23 Nov 2023 12:08) (93% - 97%)    Parameters below as of 23 Nov 2023 12:08  Patient On (Oxygen Delivery Method): room air    CONSTITUTIONAL: Well-developed, well-groomed, in no apparent distress  RESPIRATORY: Breathing comfortably; lungs CTA without wheeze/rhonchi/rales  CARDIOVASCULAR: +S1S2, RRR, no M/G/R; no lower extremity edema  VASCULAR: +left upper extremity fistula with palpable thrill  GASTROINTESTINAL: No palpable masses or tenderness  NEUROLOGIC: moves all extremities spontaneously  PSYCHIATRIC: A+O x 4; mood and affect appropriate; appropriate insight and judgment      LABS:                        9.7    4.88  )-----------( 224      ( 23 Nov 2023 07:08 )             31.0     11-23    140  |  96  |  34<H>  ----------------------------<  91  4.2   |  28  |  6.11<H>    Ca    8.5      23 Nov 2023 07:12  Phos  5.0     11-23  Mg     2.1     11-23      PT/INR - ( 22 Nov 2023 07:25 )   PT: 11.8 sec;   INR: 1.13 ratio               Urinalysis Basic - ( 23 Nov 2023 07:12 )    Color: x / Appearance: x / SG: x / pH: x  Gluc: 91 mg/dL / Ketone: x  / Bili: x / Urobili: x   Blood: x / Protein: x / Nitrite: x   Leuk Esterase: x / RBC: x / WBC x   Sq Epi: x / Non Sq Epi: x / Bacteria: x

## 2023-11-23 NOTE — PROGRESS NOTE ADULT - PROBLEM SELECTOR PLAN 3
On HD M,W,F. AVF fistula on the LUE  Nephrologist: Dr. Sandra Monte  - Nephrology consulted  - c/w Phoslo  - c/w midodrine 10mg MWF for dialysis as needed.

## 2023-11-23 NOTE — PROGRESS NOTE ADULT - ASSESSMENT
87 year old male with PMHx of HTN, HLD, CAD s/p diagnostic [facility] 06/2023 showing severe mLAD disease with severe OM not amendable to stenting, prior LAD + proximal OM stent, PCI/SKYLAR of mLAD x1 (Dr. Wesley) - 08/2023, PVD, ESRD - on HD MWF who initially presented for ambulatory pericardial drain. Procedure was cancelled due to hyperkalemia to 6.4, and the patient was admitted for further care.    He is s/p nonurgent dialysis on 11/22, now potassium is normalized. Will reach out to IR tomorrow (IR not available today for nonurgent consults d/t holiday) for pericardial drain procedure.

## 2023-11-24 LAB
ANION GAP SERPL CALC-SCNC: 21 MMOL/L — HIGH (ref 5–17)
ANION GAP SERPL CALC-SCNC: 21 MMOL/L — HIGH (ref 5–17)
APTT BLD: 31.4 SEC — SIGNIFICANT CHANGE UP (ref 24.5–35.6)
APTT BLD: 31.4 SEC — SIGNIFICANT CHANGE UP (ref 24.5–35.6)
BLD GP AB SCN SERPL QL: NEGATIVE — SIGNIFICANT CHANGE UP
BLD GP AB SCN SERPL QL: NEGATIVE — SIGNIFICANT CHANGE UP
BUN SERPL-MCNC: 57 MG/DL — HIGH (ref 7–23)
BUN SERPL-MCNC: 57 MG/DL — HIGH (ref 7–23)
CALCIUM SERPL-MCNC: 8.5 MG/DL — SIGNIFICANT CHANGE UP (ref 8.4–10.5)
CALCIUM SERPL-MCNC: 8.5 MG/DL — SIGNIFICANT CHANGE UP (ref 8.4–10.5)
CHLORIDE SERPL-SCNC: 96 MMOL/L — SIGNIFICANT CHANGE UP (ref 96–108)
CHLORIDE SERPL-SCNC: 96 MMOL/L — SIGNIFICANT CHANGE UP (ref 96–108)
CO2 SERPL-SCNC: 26 MMOL/L — SIGNIFICANT CHANGE UP (ref 22–31)
CO2 SERPL-SCNC: 26 MMOL/L — SIGNIFICANT CHANGE UP (ref 22–31)
CREAT SERPL-MCNC: 8.94 MG/DL — HIGH (ref 0.5–1.3)
CREAT SERPL-MCNC: 8.94 MG/DL — HIGH (ref 0.5–1.3)
EGFR: 5 ML/MIN/1.73M2 — LOW
EGFR: 5 ML/MIN/1.73M2 — LOW
GLUCOSE SERPL-MCNC: 84 MG/DL — SIGNIFICANT CHANGE UP (ref 70–99)
GLUCOSE SERPL-MCNC: 84 MG/DL — SIGNIFICANT CHANGE UP (ref 70–99)
HCT VFR BLD CALC: 29.6 % — LOW (ref 39–50)
HCT VFR BLD CALC: 29.6 % — LOW (ref 39–50)
HGB BLD-MCNC: 9.7 G/DL — LOW (ref 13–17)
HGB BLD-MCNC: 9.7 G/DL — LOW (ref 13–17)
INR BLD: 1.08 RATIO — SIGNIFICANT CHANGE UP (ref 0.85–1.18)
INR BLD: 1.08 RATIO — SIGNIFICANT CHANGE UP (ref 0.85–1.18)
MAGNESIUM SERPL-MCNC: 2.4 MG/DL — SIGNIFICANT CHANGE UP (ref 1.6–2.6)
MAGNESIUM SERPL-MCNC: 2.4 MG/DL — SIGNIFICANT CHANGE UP (ref 1.6–2.6)
MCHC RBC-ENTMCNC: 32.3 PG — SIGNIFICANT CHANGE UP (ref 27–34)
MCHC RBC-ENTMCNC: 32.3 PG — SIGNIFICANT CHANGE UP (ref 27–34)
MCHC RBC-ENTMCNC: 32.8 GM/DL — SIGNIFICANT CHANGE UP (ref 32–36)
MCHC RBC-ENTMCNC: 32.8 GM/DL — SIGNIFICANT CHANGE UP (ref 32–36)
MCV RBC AUTO: 98.7 FL — SIGNIFICANT CHANGE UP (ref 80–100)
MCV RBC AUTO: 98.7 FL — SIGNIFICANT CHANGE UP (ref 80–100)
NRBC # BLD: 0 /100 WBCS — SIGNIFICANT CHANGE UP (ref 0–0)
NRBC # BLD: 0 /100 WBCS — SIGNIFICANT CHANGE UP (ref 0–0)
PHOSPHATE SERPL-MCNC: 5.9 MG/DL — HIGH (ref 2.5–4.5)
PHOSPHATE SERPL-MCNC: 5.9 MG/DL — HIGH (ref 2.5–4.5)
PLATELET # BLD AUTO: 226 K/UL — SIGNIFICANT CHANGE UP (ref 150–400)
PLATELET # BLD AUTO: 226 K/UL — SIGNIFICANT CHANGE UP (ref 150–400)
POTASSIUM SERPL-MCNC: 4.3 MMOL/L — SIGNIFICANT CHANGE UP (ref 3.5–5.3)
POTASSIUM SERPL-MCNC: 4.3 MMOL/L — SIGNIFICANT CHANGE UP (ref 3.5–5.3)
POTASSIUM SERPL-SCNC: 4.3 MMOL/L — SIGNIFICANT CHANGE UP (ref 3.5–5.3)
POTASSIUM SERPL-SCNC: 4.3 MMOL/L — SIGNIFICANT CHANGE UP (ref 3.5–5.3)
PROTHROM AB SERPL-ACNC: 11.3 SEC — SIGNIFICANT CHANGE UP (ref 9.5–13)
PROTHROM AB SERPL-ACNC: 11.3 SEC — SIGNIFICANT CHANGE UP (ref 9.5–13)
RBC # BLD: 3 M/UL — LOW (ref 4.2–5.8)
RBC # BLD: 3 M/UL — LOW (ref 4.2–5.8)
RBC # FLD: 15.1 % — HIGH (ref 10.3–14.5)
RBC # FLD: 15.1 % — HIGH (ref 10.3–14.5)
RH IG SCN BLD-IMP: POSITIVE — SIGNIFICANT CHANGE UP
RH IG SCN BLD-IMP: POSITIVE — SIGNIFICANT CHANGE UP
SODIUM SERPL-SCNC: 143 MMOL/L — SIGNIFICANT CHANGE UP (ref 135–145)
SODIUM SERPL-SCNC: 143 MMOL/L — SIGNIFICANT CHANGE UP (ref 135–145)
WBC # BLD: 6.07 K/UL — SIGNIFICANT CHANGE UP (ref 3.8–10.5)
WBC # BLD: 6.07 K/UL — SIGNIFICANT CHANGE UP (ref 3.8–10.5)
WBC # FLD AUTO: 6.07 K/UL — SIGNIFICANT CHANGE UP (ref 3.8–10.5)
WBC # FLD AUTO: 6.07 K/UL — SIGNIFICANT CHANGE UP (ref 3.8–10.5)

## 2023-11-24 PROCEDURE — 99233 SBSQ HOSP IP/OBS HIGH 50: CPT

## 2023-11-24 PROCEDURE — 99232 SBSQ HOSP IP/OBS MODERATE 35: CPT | Mod: GC

## 2023-11-24 PROCEDURE — 33019 PERQ PRCRD DRG INSJ CATH CT: CPT

## 2023-11-24 RX ORDER — OXYCODONE HYDROCHLORIDE 5 MG/1
5 TABLET ORAL ONCE
Refills: 0 | Status: DISCONTINUED | OUTPATIENT
Start: 2023-11-24 | End: 2023-11-24

## 2023-11-24 RX ORDER — FENTANYL CITRATE 50 UG/ML
25 INJECTION INTRAVENOUS
Refills: 0 | Status: DISCONTINUED | OUTPATIENT
Start: 2023-11-24 | End: 2023-11-28

## 2023-11-24 RX ORDER — OXYCODONE HYDROCHLORIDE 5 MG/1
10 TABLET ORAL EVERY 6 HOURS
Refills: 0 | Status: DISCONTINUED | OUTPATIENT
Start: 2023-11-24 | End: 2023-11-28

## 2023-11-24 RX ADMIN — Medication 200 MILLIGRAM(S): at 20:57

## 2023-11-24 RX ADMIN — OXYCODONE HYDROCHLORIDE 10 MILLIGRAM(S): 5 TABLET ORAL at 17:30

## 2023-11-24 RX ADMIN — ALBUTEROL 2 PUFF(S): 90 AEROSOL, METERED ORAL at 14:35

## 2023-11-24 RX ADMIN — BUDESONIDE AND FORMOTEROL FUMARATE DIHYDRATE 2 PUFF(S): 160; 4.5 AEROSOL RESPIRATORY (INHALATION) at 06:33

## 2023-11-24 RX ADMIN — FENTANYL CITRATE 25 MICROGRAM(S): 50 INJECTION INTRAVENOUS at 14:25

## 2023-11-24 RX ADMIN — TAMSULOSIN HYDROCHLORIDE 0.4 MILLIGRAM(S): 0.4 CAPSULE ORAL at 20:57

## 2023-11-24 RX ADMIN — OXYCODONE HYDROCHLORIDE 10 MILLIGRAM(S): 5 TABLET ORAL at 16:50

## 2023-11-24 RX ADMIN — Medication 1334 MILLIGRAM(S): at 08:46

## 2023-11-24 RX ADMIN — BUDESONIDE AND FORMOTEROL FUMARATE DIHYDRATE 2 PUFF(S): 160; 4.5 AEROSOL RESPIRATORY (INHALATION) at 16:50

## 2023-11-24 RX ADMIN — Medication 1334 MILLIGRAM(S): at 16:50

## 2023-11-24 RX ADMIN — ATORVASTATIN CALCIUM 20 MILLIGRAM(S): 80 TABLET, FILM COATED ORAL at 20:57

## 2023-11-24 RX ADMIN — Medication 137 MICROGRAM(S): at 06:33

## 2023-11-24 RX ADMIN — Medication 25 MILLIGRAM(S): at 06:33

## 2023-11-24 RX ADMIN — OXYCODONE HYDROCHLORIDE 5 MILLIGRAM(S): 5 TABLET ORAL at 20:57

## 2023-11-24 RX ADMIN — OXYCODONE HYDROCHLORIDE 5 MILLIGRAM(S): 5 TABLET ORAL at 21:30

## 2023-11-24 NOTE — PROGRESS NOTE ADULT - PROBLEM SELECTOR PLAN 1
In setting of ESRD. Serum k mildly elevated at 6.4. Pt received Insulin + D50, albuterol Nebs, furosemide and lokelma 10g once. Repeat K was 6.1---> 5.0 ---> 5.7---> 4.3 today.. His pericardial drain procedure was postponed due to his hyperkalemia.   no EKG changes noted on monitor. Pt is on Lokelma as his home medication.       PLAN:  Betablocker can cause rise in potassium. But continue that for now.   Low K diet.   HD will be done today.

## 2023-11-24 NOTE — CHART NOTE - NSCHARTNOTEFT_GEN_A_CORE
IR Consult: Pericardial Drain  Assessment/Plan:   86yo Male with PMHx of HTN, HLD, CAD s/p diagnostic [facility] 06/2023 showing severe mLAD disease with severe OM not amendable to stenting, prior LAD + proximal OM stent, PCI/SKYLAR of mLAD x1 (Dr. Wesley) - 08/2023, PVD, LVEF 65-70%, ESRD - on HD diagnosed 6-years ago MWF schedule via AVF left elbow w/ last session 11/20/2023 at [facility] and follows with Dr. Sandra Monte, and anemia presenting to the hospital for pericardial drain. Procedure cancelled 2/2 hyperkalemia. Of note pt was recently admitted for evaluation of worsening CAD w/ CP, SOB, exertional dyspnea. He underwent a LHC and was found to non obstructive CAD. Additionally he was found to have a predominantly posterior pericardial effusion, confirmed on TTE. After discussion between cardiology and IR, the patient is planned for a pericardial window outpatient Tuesday, 11/21 after holding Plavix for 5-days via interventional radiology.   -patient presented to IR for planned pericardial drain placement on 11/21 w/ STAT BMP with K of 6.4. Spoke with patient via [employer] phone  [employer] ID #[id number]. Per d/w patient underwent HD via LUE AVF x 3hrs. Case d/w Dr. garcia and anesthesia, plan for admission for medical optimization and pericardial drainage pending medical optimization.    - pt now medically optimized therefore will plan for pericardial drain placement today  - remain NPO  - d/w primary team

## 2023-11-24 NOTE — PROGRESS NOTE ADULT - ASSESSMENT
87 year old male with PMHx of HTN, HLD, CAD s/p diagnostic [facility] 06/2023 showing severe mLAD disease with severe OM not amendable to stenting, prior LAD + proximal OM stent, PCI/SKYLAR of mLAD x1 (Dr. Wesley) - 08/2023, PVD, ESRD - on HD MWF who initially presented for ambulatory pericardial drain. Procedure was cancelled due to hyperkalemia to 6.4, and the patient was admitted for further care.    He is s/p nonurgent dialysis on 11/22, now potassium is normalized. Pt is now s/p IR guided pericardial drain placement, on 11/24.

## 2023-11-24 NOTE — PROGRESS NOTE ADULT - SUBJECTIVE AND OBJECTIVE BOX
[facility] Division of Kidney Diseases & Hypertension  FOLLOW UP NOTE  [phone]--------------------------------------------------------------------------------  Chief Complaint:ST elevation myocardial infarction (STEMI)        24 hour events/subjective:  Pt was seen and examined at the bedside. No acute overnight events. No fresh complaints. Pt was kept NPO for possible IR guided pericardial drain.           PAST HISTORY  --------------------------------------------------------------------------------  No significant changes to PMH, PSH, FHx, SHx, unless otherwise noted    ALLERGIES & MEDICATIONS  --------------------------------------------------------------------------------  Allergies    No Known Allergies    Intolerances    acetaminophen (Other (Mild))    Standing Inpatient Medications  aspirin enteric coated 81 milliGRAM(s) Oral daily  atorvastatin 20 milliGRAM(s) Oral at bedtime  budesonide  80 MICROgram(s)/formoterol 4.5 MICROgram(s) Inhaler 2 Puff(s) Inhalation two times a day  calcium acetate 1334 milliGRAM(s) Oral three times a day with meals  levothyroxine 137 MICROGram(s) Oral daily  metoprolol succinate ER 25 milliGRAM(s) Oral daily  Nephro-lenore 1 Tablet(s) Oral daily  tamsulosin 0.4 milliGRAM(s) Oral at bedtime  tiotropium 2.5 MICROgram(s) Inhaler 2 Puff(s) Inhalation daily    PRN Inpatient Medications  albuterol    90 MICROgram(s) HFA Inhaler 2 Puff(s) Inhalation every 6 hours PRN  fentaNYL    Injectable 25 MICROGram(s) IV Push every 5 minutes PRN  melatonin 3 milliGRAM(s) Oral at bedtime PRN  midodrine. 10 milliGRAM(s) Oral <User Schedule> PRN  ondansetron Injectable 4 milliGRAM(s) IV Push every 8 hours PRN      REVIEW OF SYSTEMS  --------------------------------------------------------------------------------  Gen: No  fevers/chills  Skin: No rashes  Head/Eyes/Ears/Mouth: No headache; Normal hearing; Normal vision w/o blurriness  Respiratory: No dyspnea, cough, wheezing, hemoptysis  CV: No chest pain, PND, orthopnea  GI: No abdominal pain, diarrhea, constipation, nausea, vomiting  : No increased frequency, dysuria, hematuria, nocturia  MSK: No joint pain/swelling; no back pain; no edema  Neuro: No dizziness/lightheadedness, weakness, seizures, numbness, tingling      All other systems were reviewed and are negative, except as noted.      VITALS/PHYSICAL EXAM  --------------------------------------------------------------------------------  T(C): 36.6 (11-24-23 @ 13:15), Max: 37.3 (11-23-23 @ 21:01)  HR: 75 (11-24-23 @ 14:15) (67 - 80)  BP: 132/68 (11-24-23 @ 14:15) (132/68 - 169/66)  RR: 15 (11-24-23 @ 14:15) (15 - 18)  SpO2: 96% (11-24-23 @ 14:15) (96% - 100%)  Wt(kg): --  Height (cm): 165.1 (11-24-23 @ 12:20)  Weight (kg): 82 (11-24-23 @ 12:20)  BMI (kg/m2): 30.1 (11-24-23 @ 12:20)  BSA (m2): 1.89 (11-24-23 @ 12:20)      11-23-23 @ 07:01  -  11-24-23 @ 07:00  --------------------------------------------------------  IN: 240 mL / OUT: 0 mL / NET: 240 mL      Physical Exam:  Gen: NAD  HEENT: Anicteric  Pulm: CTA B/L  CV: S1S2+  Abd: Soft, +BS    Ext: No LE edema B/L  Neuro: Awake  Skin: Warm and dry  Dialysis access: ANGELINA RAZA     LABS/STUDIES  --------------------------------------------------------------------------------              9.7    6.07  >-----------<  226      [11-24-23 @ 07:33]              29.6     143  |  96  |  57  ----------------------------<  84      [11-24-23 @ 07:33]  4.3   |  26  |  8.94        Ca     8.5     [11-24-23 @ 07:33]      Mg     2.4     [11-24-23 @ 07:33]      Phos  5.9     [11-24-23 @ 07:33]      PT/INR: PT 11.3 , INR 1.08       [11-24-23 @ 07:33]  PTT: 31.4       [11-24-23 @ 07:33]      Creatinine Trend:  SCr 8.94 [11-24 @ 07:33]  SCr 6.11 [11-23 @ 07:12]  SCr 6.18 [11-23 @ 07:08]  SCr 8.44 [11-22 @ 07:21]  SCr 6.98 [11-21 @ 16:54]    Urinalysis - [11-24-23 @ 07:33]      Color  / Appearance  / SG  / pH       Gluc 84 / Ketone   / Bili  / Urobili        Blood  / Protein  / Leuk Est  / Nitrite       RBC  / WBC  / Hyaline  / Gran  / Sq Epi  / Non Sq Epi  / Bacteria         HBsAg Nonreact      [11-22-23 @ 10:05]

## 2023-11-24 NOTE — PROGRESS NOTE ADULT - PROBLEM SELECTOR PLAN 2
Maintenance HD MWF - [facility] via Lt[facility] AVF.   Consent was obtained and it was placed in charts.   He is on Aluminium containing antacid. In ESRD pts, aluminium accumulation is a worrisome complication. Try to avoid it as much as possible.  C/w Phoslo - home dose.  Anemia - 9.7. Asymptomatic. Follow up with out pt HD center for VAN during HD.     If you have any questions, please feel free to contact me  Finn Vance  Nephrology Fellow  [phone]; Prefer Teams.  (After 5pm or on weekends please page the on-call fellow).

## 2023-11-24 NOTE — PROGRESS NOTE ADULT - PROBLEM SELECTOR PLAN 1
New posterior pericardial effusion seen while getting [facility] (11/16). Unknown etiology as of yet  - Hemodynamically stable  - Continue to hold Plavix. Will hold DVT ppx as well.   - s/p pericardial drain placement on 11/24, approximately 800 c of serious fluid was aspirated  - will obtain pericardial fluid studies-- gram stain, LDH, protein (ordered after drainage)  - will obtain cardiology consult for further recommendations

## 2023-11-24 NOTE — CONSULT NOTE ADULT - ASSESSMENT
86 y/o [place] male with PMH of HTN, HLD, hypothyroidism, ESRD on HD, CAD s/pPCI to LAD 8/2023 and OM lesion not amenable to stenting, presenting with typical angina with no new obstructive CAD, but noted to have large pericardial effusion.   S/p pericardial drain placement by IR on 11/24/23  Unclear etiology, no significant uremia, ddx includes pericarditis vs malignancy vs idiopathic. Would consult IR for diagnostic drainage.    Recs:  - c/w ASA, atorva 20  - plavix held for 5 days prior to procedure per IR recs. please restart once able to from IR perspective  -c/w toprol 25mg, with midodrine on dialysis days  -limited TTE tomrrow AM to eval effusion  -f/u IR recommendations regarding removal of drain       88 y/o [place] male with PMH of HTN, HLD, hypothyroidism, ESRD on HD, CAD s/pPCI to LAD 8/2023 and OM lesion not amenable to stenting, presenting with typical angina with no new obstructive CAD, but noted to have large pericardial effusion.   S/p pericardial drain placement by IR on 11/24/23  Unclear etiology, no significant uremia, ddx includes pericarditis vs malignancy vs idiopathic. Would consult IR for diagnostic drainage.    Recs:  - c/w ASA, atorva 20  - plavix held for 5 days prior to procedure per IR recs. please restart once able to from IR perspective  - continue to observe output from drain  - would send pericardial fluid for studies  -c/w toprol 25mg  - c/w midodrine on dialysis days  -limited TTE tomorrow AM to eval effusion  -f/u IR recommendations regarding removal of drain    - rest per primary    Darin Haney MD  PGY-4, Cardiology Fellow    Please check [email or web address]: [email or web address]" for cardiology service schedule and contact information.   *Please note that all recommendations are incomplete until attending attestation*       87 year old man with HTN, hypothyroidism, ESRD on HD via AVF, CAD s/pPCI to LAD 8/2023 and OM lesion not amenable to PCI who presented with angina like symptoms and was found to have large pericardial effusion. Coronary angiography showed no new obstructive CAD.    S/p pericardial drain placement by IR on 11/24/23, straw colored clear fluid.     Recommendations:  - c/w ASA, atorva 20  - plavix held for 5 days prior to procedure per IR recs. please restart once able to from IR perspective  - continue to observe output from drain  - send pericardial fluid from drain for cytology  -c/w Toprol 25mg  - c/w midodrine on dialysis days  -limited TTE tomorrow AM to eval effusion  -f/u IR recommendations regarding removal of drain    - rest per primary    Darin Haney MD  PGY-4, Cardiology Fellow    Please check [email or web address]: [email or web address] for cardiology service schedule and contact information.

## 2023-11-24 NOTE — PROGRESS NOTE ADULT - SUBJECTIVE AND OBJECTIVE BOX
Angelina Davis MD  Hospitalist  Available on MS Teams      PROGRESS NOTE:     Patient is a 87y old  Male who presents with a chief complaint of Pericardial drain (23 Nov 2023 15:11)      SUBJECTIVE / OVERNIGHT EVENTS:  No acute events overnight.  Tele: Sinus 60s.    Pt was seen this morning prior to pericardial drain placement. He had no acute complaints.    MEDICATIONS  (STANDING):  aspirin enteric coated 81 milliGRAM(s) Oral daily  atorvastatin 20 milliGRAM(s) Oral at bedtime  budesonide  80 MICROgram(s)/formoterol 4.5 MICROgram(s) Inhaler 2 Puff(s) Inhalation two times a day  calcium acetate 1334 milliGRAM(s) Oral three times a day with meals  levothyroxine 137 MICROGram(s) Oral daily  metoprolol succinate ER 25 milliGRAM(s) Oral daily  Nephro-lenore 1 Tablet(s) Oral daily  tamsulosin 0.4 milliGRAM(s) Oral at bedtime  tiotropium 2.5 MICROgram(s) Inhaler 2 Puff(s) Inhalation daily    MEDICATIONS  (PRN):  albuterol    90 MICROgram(s) HFA Inhaler 2 Puff(s) Inhalation every 6 hours PRN Shortness of Breath and/or Wheezing  melatonin 3 milliGRAM(s) Oral at bedtime PRN Insomnia  midodrine. 10 milliGRAM(s) Oral <User Schedule> PRN Low BP prior to HD  ondansetron Injectable 4 milliGRAM(s) IV Push every 8 hours PRN Nausea and/or Vomiting      CAPILLARY BLOOD GLUCOSE        I&O's Summary    23 Nov 2023 07:01  -  24 Nov 2023 07:00  --------------------------------------------------------  IN: 240 mL / OUT: 0 mL / NET: 240 mL        PHYSICAL EXAM:  Vital Signs Last 24 Hrs  T(C): 36.3 (24 Nov 2023 12:20), Max: 37.3 (23 Nov 2023 21:01)  T(F): 97.4 (24 Nov 2023 11:45), Max: 99.2 (23 Nov 2023 21:01)  HR: 80 (24 Nov 2023 12:20) (67 - 80)  BP: 153/69 (24 Nov 2023 12:20) (153/69 - 169/66)  BP(mean): --  RR: 15 (24 Nov 2023 12:20) (15 - 18)  SpO2: 100% (24 Nov 2023 12:20) (96% - 100%)    Parameters below as of 24 Nov 2023 04:50  Patient On (Oxygen Delivery Method): room air    CONSTITUTIONAL: Well-developed, well-groomed, in no apparent distress  RESPIRATORY: Breathing comfortably; lungs CTA without wheeze/rhonchi/rales  CARDIOVASCULAR: +S1S2, RRR, no M/G/R; no lower extremity edema  VASCULAR: +left upper extremity fistula with palpable thrill  GASTROINTESTINAL: No palpable masses or tenderness  NEUROLOGIC: moves all extremities spontaneously  PSYCHIATRIC: A+O x 4; mood and affect appropriate; appropriate insight and judgment      LABS:                        9.7    6.07  )-----------( 226      ( 24 Nov 2023 07:33 )             29.6     11-24    143  |  96  |  57<H>  ----------------------------<  84  4.3   |  26  |  8.94<H>    Ca    8.5      24 Nov 2023 07:33  Phos  5.9     11-24  Mg     2.4     11-24      PT/INR - ( 24 Nov 2023 07:33 )   PT: 11.3 sec;   INR: 1.08 ratio         PTT - ( 24 Nov 2023 07:33 )  PTT:31.4 sec      Urinalysis Basic - ( 24 Nov 2023 07:33 )    Color: x / Appearance: x / SG: x / pH: x  Gluc: 84 mg/dL / Ketone: x  / Bili: x / Urobili: x   Blood: x / Protein: x / Nitrite: x   Leuk Esterase: x / RBC: x / WBC x   Sq Epi: x / Non Sq Epi: x / Bacteria: x

## 2023-11-24 NOTE — CONSULT NOTE ADULT - SUBJECTIVE AND OBJECTIVE BOX
Cardiology Consult Note   [Please check [email or web address] password: [email or web address]" for cardiology service schedule and contact information]    HPI:  86yo Male with PMHx of HTN, HLD, CAD s/p diagnostic LHC 06/2023 showing severe mLAD disease with severe OM not amendable to stenting, prior LAD + proximal OM stent, PCI/SKYLAR of mLAD x1 (Dr. Wesley) - 08/2023, PVD, LVEF 65-70%, ESRD - on HD diagnosed 6-years ago MWF schedule via AVF left elbow w/ last session 11/20/2023 at [facility] and follows with Dr. Sandra Monte, and anemia presenting to the hospital for pericardial drain. Procedure cancelled 2/2 hyperkalemia. States tolerated full 3 hr session of HD yesterday. Of note pt was recently admitted for evaluation of worsening CAD w/ CP, SOB, exertional dyspnea. He underwent a LHC and was found to non obstructive CAD. Additionally he was found to have a predominantly posterior pericardial effusion, confirmed on TTE. After discussion between cardiology and IR, the patient is planned for a pericardial window outpatient Tuesday, 11/21 after holding Plavix for 5-days via interventional radiology    (21 Nov 2023 13:19)    Patient s/p pericardial drain placement by IR on 11/21/23  ~800cc fluid drained initially    PAST MEDICAL & SURGICAL HISTORY:  Hypothyroidism      BPH (benign prostatic hyperplasia)      Acute bronchitis      COVID-19      CAD (coronary artery disease)      HLD (hyperlipidemia)      End stage renal failure on dialysis  M-W-F      BPV (benign positional vertigo)      HTN (hypertension)      Pericardial effusion      History of coronary artery stent placement  5 yrs ago      Pericardial effusion  drained        FAMILY HISTORY:    SOCIAL HISTORY:  unchanged    MEDICATIONS:  aspirin enteric coated 81 milliGRAM(s) Oral daily  metoprolol succinate ER 25 milliGRAM(s) Oral daily  midodrine. 10 milliGRAM(s) Oral <User Schedule> PRN      albuterol    90 MICROgram(s) HFA Inhaler 2 Puff(s) Inhalation every 6 hours PRN  budesonide  80 MICROgram(s)/formoterol 4.5 MICROgram(s) Inhaler 2 Puff(s) Inhalation two times a day  tiotropium 2.5 MICROgram(s) Inhaler 2 Puff(s) Inhalation daily    fentaNYL    Injectable 25 MICROGram(s) IV Push every 5 minutes PRN  melatonin 3 milliGRAM(s) Oral at bedtime PRN  ondansetron Injectable 4 milliGRAM(s) IV Push every 8 hours PRN  oxyCODONE    IR 10 milliGRAM(s) Oral every 6 hours PRN      atorvastatin 20 milliGRAM(s) Oral at bedtime  levothyroxine 137 MICROGram(s) Oral daily    calcium acetate 1334 milliGRAM(s) Oral three times a day with meals  Nephro-lenore 1 Tablet(s) Oral daily  tamsulosin 0.4 milliGRAM(s) Oral at bedtime      REVIEW OF SYSTEMS:  CV: chest pain (-), palpitation (-), orthopnea (-), PND (-), edema (-)  PULM: SOB (-), cough (-), wheezing (-), hemoptysis (-).   CONST: fever (-), chills (-) or fatigue (-)  GI: abdominal distension (-), abdominal pain (-) , nausea/vomiting (-), hematemesis, (-), melena (-), hematochezia (-)  : dysuria (-), frequency (-), hematuria (-).   NEURO: numbness (-), weakness (-), dizziness (-)  SKIN: itching (-), rash (-)  HEENT:  visual changes (-); vertigo or throat pain (-);  neck stiffness (-)     All other review of systems is negative unless indicated above.   -------------------------------------------------------------------------------------------  PHYSICAL EXAM:  T(C): 36.4 (11-24-23 @ 15:43), Max: 37.3 (11-23-23 @ 21:01)  HR: 73 (11-24-23 @ 15:43) (67 - 80)  BP: 118/60 (11-24-23 @ 15:43) (118/60 - 169/66)  RR: 18 (11-24-23 @ 15:43) (13 - 18)  SpO2: 96% (11-24-23 @ 15:43) (91% - 100%)  Wt(kg): --  I&O's Summary    23 Nov 2023 07:01  -  24 Nov 2023 07:00  --------------------------------------------------------  IN: 240 mL / OUT: 0 mL / NET: 240 mL        GENERAL: NAD  HEAD: Atraumatic, Normocephalic.  EYES: EOMI, PERRLA, conjunctiva and sclera clear.  ENT: Moist mucous membranes.  NECK: Supple, No JVD.  CHEST/LUNG: Clear to auscultation bilaterally; No rales, rhonchi, wheezing, or rubs. Unlabored respirations.  HEART: Regular rate and rhythm; No murmurs, rubs, or gallops.  ABDOMEN: Bowel sounds present; Soft, Nontender, Nondistended.   EXTREMITIES:  2+ Peripheral Pulses, brisk capillary refill. No clubbing, cyanosis, or edema.  PSYCH: Normal affect.  SKIN: No rashes or lesions.    -------------------------------------------------------------------------------------------  LABS:                          9.7    6.07  )-----------( 226      ( 24 Nov 2023 07:33 )             29.6     11-24    143  |  96  |  57<H>  ----------------------------<  84  4.3   |  26  |  8.94<H>    Ca    8.5      24 Nov 2023 07:33  Phos  5.9     11-24  Mg     2.4     11-24      PT/INR - ( 24 Nov 2023 07:33 )   PT: 11.3 sec;   INR: 1.08 ratio         PTT - ( 24 Nov 2023 07:33 )  PTT:31.4 sec        albuterol    90 MICROgram(s) HFA Inhaler 2 Puff(s) Inhalation every 6 hours PRN  budesonide  80 MICROgram(s)/formoterol 4.5 MICROgram(s) Inhaler 2 Puff(s) Inhalation two times a day  tiotropium 2.5 MICROgram(s) Inhaler 2 Puff(s) Inhalation daily    fentaNYL    Injectable 25 MICROGram(s) IV Push every 5 minutes PRN  melatonin 3 milliGRAM(s) Oral at bedtime PRN  ondansetron Injectable 4 milliGRAM(s) IV Push every 8 hours PRN  oxyCODONE    IR 10 milliGRAM(s) Oral every 6 hours PRN      -------------------------------------------------------------------------------------------  Cardiovascular Diagnostic Testing:    ECG:     Echo:     Stress Testing:    Cath:    -------------------------------------------------------------------------------------------    *Please note that all recommendations are incomplete until attending attestation*    Darin Haney MD  PGY-4, Cardiology Fellow  [phone]             Cardiology Consult Note   [Please check [email or web address] password: [email or web address]" for cardiology service schedule and contact information]    HPI:  88yo Male with PMHx of HTN, HLD, CAD PCI (LAD x 2, pOM), PVD, LVEF 65-70%, ESRD - on HD diagnosed 6-years ago MWF schedule via AVF left elbow w/ last session 11/20/2023 at [facility] and follows with Dr. Sandra Monte, and anemia presenting to the hospital for pericardial drain. Procedure cancelled 2/2 hyperkalemia. States tolerated full 3 hr session of HD yesterday. Of note pt was recently admitted for evaluation of worsening CAD w/ CP, SOB, exertional dyspnea. He underwent a LHC and was found to non obstructive CAD. Additionally he was found to have a predominantly posterior pericardial effusion, confirmed on TTE. After discussion between cardiology and IR, the patient is planned for a pericardial window outpatient Tuesday, 11/21 after holding Plavix for 5-days via interventional radiology    (21 Nov 2023 13:19)    Patient s/p pericardial drain placement by IR on 11/21/23  ~800cc fluid drained initially    PAST MEDICAL & SURGICAL HISTORY:  Hypothyroidism      BPH (benign prostatic hyperplasia)      Acute bronchitis      COVID-19      CAD (coronary artery disease)      HLD (hyperlipidemia)      End stage renal failure on dialysis  M-W-F      BPV (benign positional vertigo)      HTN (hypertension)      Pericardial effusion      History of coronary artery stent placement  5 yrs ago      Pericardial effusion  drained        FAMILY HISTORY:    SOCIAL HISTORY:  unchanged    MEDICATIONS:  aspirin enteric coated 81 milliGRAM(s) Oral daily  metoprolol succinate ER 25 milliGRAM(s) Oral daily  midodrine. 10 milliGRAM(s) Oral <User Schedule> PRN      albuterol    90 MICROgram(s) HFA Inhaler 2 Puff(s) Inhalation every 6 hours PRN  budesonide  80 MICROgram(s)/formoterol 4.5 MICROgram(s) Inhaler 2 Puff(s) Inhalation two times a day  tiotropium 2.5 MICROgram(s) Inhaler 2 Puff(s) Inhalation daily    fentaNYL    Injectable 25 MICROGram(s) IV Push every 5 minutes PRN  melatonin 3 milliGRAM(s) Oral at bedtime PRN  ondansetron Injectable 4 milliGRAM(s) IV Push every 8 hours PRN  oxyCODONE    IR 10 milliGRAM(s) Oral every 6 hours PRN      atorvastatin 20 milliGRAM(s) Oral at bedtime  levothyroxine 137 MICROGram(s) Oral daily    calcium acetate 1334 milliGRAM(s) Oral three times a day with meals  Nephro-leonre 1 Tablet(s) Oral daily  tamsulosin 0.4 milliGRAM(s) Oral at bedtime      REVIEW OF SYSTEMS:  CV: chest pain (+), palpitation (-), orthopnea (-), PND (-), edema (-)  PULM: SOB (-), cough (-), wheezing (-), hemoptysis (-).   CONST: fever (-), chills (-) or fatigue (-)  GI: abdominal distension (-), abdominal pain (-) , nausea/vomiting (-), hematemesis, (-), melena (-), hematochezia (-)  : dysuria (-), frequency (-), hematuria (-).   NEURO: numbness (-), weakness (-), dizziness (-)  SKIN: itching (-), rash (-)  HEENT:  visual changes (-); vertigo or throat pain (-);  neck stiffness (-)     All other review of systems is negative unless indicated above.   -------------------------------------------------------------------------------------------  PHYSICAL EXAM:  T(C): 36.4 (11-24-23 @ 15:43), Max: 37.3 (11-23-23 @ 21:01)  HR: 73 (11-24-23 @ 15:43) (67 - 80)  BP: 118/60 (11-24-23 @ 15:43) (118/60 - 169/66)  RR: 18 (11-24-23 @ 15:43) (13 - 18)  SpO2: 96% (11-24-23 @ 15:43) (91% - 100%)  Wt(kg): --  I&O's Summary    23 Nov 2023 07:01  -  24 Nov 2023 07:00  --------------------------------------------------------  IN: 240 mL / OUT: 0 mL / NET: 240 mL        GENERAL: NAD  HEAD: Atraumatic, Normocephalic.  EYES: EOMI, PERRLA, conjunctiva and sclera clear.  ENT: Moist mucous membranes.  NECK: Supple, No JVD.  CHEST/LUNG: Clear to auscultation bilaterally; No rales, rhonchi, wheezing, or rubs. Unlabored respirations.  HEART: Regular rate and rhythm; No murmurs, rubs, or gallops.  ABDOMEN: Bowel sounds present; Soft, Nontender, Nondistended.   EXTREMITIES:  2+ Peripheral Pulses, brisk capillary refill. No clubbing, cyanosis, or edema.  PSYCH: Normal affect.  SKIN: No rashes or lesions.    -------------------------------------------------------------------------------------------  LABS:                          9.7    6.07  )-----------( 226      ( 24 Nov 2023 07:33 )             29.6     11-24    143  |  96  |  57<H>  ----------------------------<  84  4.3   |  26  |  8.94<H>    Ca    8.5      24 Nov 2023 07:33  Phos  5.9     11-24  Mg     2.4     11-24      PT/INR - ( 24 Nov 2023 07:33 )   PT: 11.3 sec;   INR: 1.08 ratio         PTT - ( 24 Nov 2023 07:33 )  PTT:31.4 sec        albuterol    90 MICROgram(s) HFA Inhaler 2 Puff(s) Inhalation every 6 hours PRN  budesonide  80 MICROgram(s)/formoterol 4.5 MICROgram(s) Inhaler 2 Puff(s) Inhalation two times a day  tiotropium 2.5 MICROgram(s) Inhaler 2 Puff(s) Inhalation daily    fentaNYL    Injectable 25 MICROGram(s) IV Push every 5 minutes PRN  melatonin 3 milliGRAM(s) Oral at bedtime PRN  ondansetron Injectable 4 milliGRAM(s) IV Push every 8 hours PRN  oxyCODONE    IR 10 milliGRAM(s) Oral every 6 hours PRN             Cardiology Consult Note   [Please check [email or web address] password: "[email or web address]" for cardiology service schedule and contact information]    HPI:  86yo Male with PMHx of HTN, HLD, CAD PCI (LAD x 2, pOM), PVD, LVEF 65-70%, ESRD - on HD diagnosed 6-years ago MWF schedule via AVF left elbow w/ last session 11/20/2023 at [facility] [place] and follows with Dr. Sandra Monte, and anemia presenting to the hospital for pericardial drain. Procedure cancelled 2/2 hyperkalemia. States tolerated full 3 hr session of HD yesterday. Of note pt was recently admitted for evaluation of worsening CAD w/ CP, SOB, exertional dyspnea. He underwent a LHC and was found to non obstructive CAD. Additionally he was found to have a predominantly posterior pericardial effusion, confirmed on TTE. After discussion between cardiology and IR, a pericardiocentesis was performed 11/24 after holding clopidogrel for 5 days.   (21 Nov 2023 13:19)    Patient s/p pericardial drain placement by IR on 11/21/23  ~800cc fluid drained initially    PAST MEDICAL & SURGICAL HISTORY:  Hypothyroidism      BPH (benign prostatic hyperplasia)      Acute bronchitis      COVID-19      CAD (coronary artery disease)      HLD (hyperlipidemia)      End stage renal failure on dialysis  M-W-F      BPV (benign positional vertigo)      HTN (hypertension)      Pericardial effusion      History of coronary artery stent placement  5 yrs ago      Pericardial effusion  drained        FAMILY HISTORY:    SOCIAL HISTORY:  unchanged    MEDICATIONS:  aspirin enteric coated 81 milliGRAM(s) Oral daily  metoprolol succinate ER 25 milliGRAM(s) Oral daily  midodrine. 10 milliGRAM(s) Oral <User Schedule> PRN      albuterol    90 MICROgram(s) HFA Inhaler 2 Puff(s) Inhalation every 6 hours PRN  budesonide  80 MICROgram(s)/formoterol 4.5 MICROgram(s) Inhaler 2 Puff(s) Inhalation two times a day  tiotropium 2.5 MICROgram(s) Inhaler 2 Puff(s) Inhalation daily    fentaNYL    Injectable 25 MICROGram(s) IV Push every 5 minutes PRN  melatonin 3 milliGRAM(s) Oral at bedtime PRN  ondansetron Injectable 4 milliGRAM(s) IV Push every 8 hours PRN  oxyCODONE    IR 10 milliGRAM(s) Oral every 6 hours PRN      atorvastatin 20 milliGRAM(s) Oral at bedtime  levothyroxine 137 MICROGram(s) Oral daily    calcium acetate 1334 milliGRAM(s) Oral three times a day with meals  Nephro-lenore 1 Tablet(s) Oral daily  tamsulosin 0.4 milliGRAM(s) Oral at bedtime      REVIEW OF SYSTEMS:  CV: chest pain (+), palpitation (-), orthopnea (-), PND (-), edema (-)  PULM: SOB (-), cough (-), wheezing (-), hemoptysis (-).   CONST: fever (-), chills (-) or fatigue (-)  GI: abdominal distension (-), abdominal pain (-) , nausea/vomiting (-), hematemesis, (-), melena (-), hematochezia (-)  : dysuria (-), frequency (-), hematuria (-).   NEURO: numbness (-), weakness (-), dizziness (-)  SKIN: itching (-), rash (-)  HEENT:  visual changes (-); vertigo or throat pain (-);  neck stiffness (-)     All other review of systems is negative unless indicated above.   -------------------------------------------------------------------------------------------  PHYSICAL EXAM:  T(C): 36.4 (11-24-23 @ 15:43), Max: 37.3 (11-23-23 @ 21:01)  HR: 73 (11-24-23 @ 15:43) (67 - 80)  BP: 118/60 (11-24-23 @ 15:43) (118/60 - 169/66)  RR: 18 (11-24-23 @ 15:43) (13 - 18)  SpO2: 96% (11-24-23 @ 15:43) (91% - 100%)  Wt(kg): --  I&O's Summary    23 Nov 2023 07:01  -  24 Nov 2023 07:00  --------------------------------------------------------  IN: 240 mL / OUT: 0 mL / NET: 240 mL        GENERAL: NAD  HEAD: Atraumatic, Normocephalic.  ENT: Moist mucous membranes.  NECK: No JVD.  CHEST/LUNG: Clear to auscultation, Unlabored respirations.  HEART: Regular rate and rhythm; No murmur  ABDOMEN: Soft, Nontender  EXTREMITIES:  warm., UE AVF  PSYCH: Normal affect.  SKIN: No rashes or lesions.    -------------------------------------------------------------------------------------------  LABS:                          9.7    6.07  )-----------( 226      ( 24 Nov 2023 07:33 )             29.6     11-24    143  |  96  |  57<H>  ----------------------------<  84  4.3   |  26  |  8.94<H>    Ca    8.5      24 Nov 2023 07:33  Phos  5.9     11-24  Mg     2.4     11-24      PT/INR - ( 24 Nov 2023 07:33 )   PT: 11.3 sec;   INR: 1.08 ratio         PTT - ( 24 Nov 2023 07:33 )  PTT:31.4 sec

## 2023-11-24 NOTE — CONSULT NOTE ADULT - ATTENDING COMMENTS
The patient is s/p pericardiocentesis with drain placement by IR for large, circumferential pericardial effusion. He has known CAD with prior PCI (April 2023) and clopidogrel was stopped for the pericardiocentesis procedure.    - resume clopidogrel now and continue aspirin  - continue maximal medical therapy for residual CAD with atorvastatin, DAPT, Toprol  - send pericardial fluid from drain for analysis/cytology    LIZZ Riggs MD [facility]  Cardiology

## 2023-11-24 NOTE — PRE-OP CHECKLIST - RESPIRATORY RATE (BREATHS/MIN)
Tylenol and Benadryl to help with headache as needed     Saline nasal spray/ netti pots can help with congestion     Melatonin (over the counter) can help with sleep     Follow up with primary care doctor as needed   15

## 2023-11-25 LAB
ALBUMIN FLD-MCNC: 2.6 G/DL — SIGNIFICANT CHANGE UP
ALBUMIN FLD-MCNC: 2.6 G/DL — SIGNIFICANT CHANGE UP
ANION GAP SERPL CALC-SCNC: 19 MMOL/L — HIGH (ref 5–17)
ANION GAP SERPL CALC-SCNC: 19 MMOL/L — HIGH (ref 5–17)
B PERT IGG+IGM PNL SER: CLEAR — SIGNIFICANT CHANGE UP
B PERT IGG+IGM PNL SER: CLEAR — SIGNIFICANT CHANGE UP
BUN SERPL-MCNC: 66 MG/DL — HIGH (ref 7–23)
BUN SERPL-MCNC: 66 MG/DL — HIGH (ref 7–23)
CALCIUM SERPL-MCNC: 8.3 MG/DL — LOW (ref 8.4–10.5)
CALCIUM SERPL-MCNC: 8.3 MG/DL — LOW (ref 8.4–10.5)
CHLORIDE SERPL-SCNC: 96 MMOL/L — SIGNIFICANT CHANGE UP (ref 96–108)
CHLORIDE SERPL-SCNC: 96 MMOL/L — SIGNIFICANT CHANGE UP (ref 96–108)
CO2 SERPL-SCNC: 24 MMOL/L — SIGNIFICANT CHANGE UP (ref 22–31)
CO2 SERPL-SCNC: 24 MMOL/L — SIGNIFICANT CHANGE UP (ref 22–31)
COLOR FLD: YELLOW
COLOR FLD: YELLOW
CREAT SERPL-MCNC: 10.64 MG/DL — HIGH (ref 0.5–1.3)
CREAT SERPL-MCNC: 10.64 MG/DL — HIGH (ref 0.5–1.3)
EGFR: 4 ML/MIN/1.73M2 — LOW
EGFR: 4 ML/MIN/1.73M2 — LOW
FLUID INTAKE SUBSTANCE CLASS: SIGNIFICANT CHANGE UP
FLUID INTAKE SUBSTANCE CLASS: SIGNIFICANT CHANGE UP
GLUCOSE BLDC GLUCOMTR-MCNC: 124 MG/DL — HIGH (ref 70–99)
GLUCOSE BLDC GLUCOMTR-MCNC: 124 MG/DL — HIGH (ref 70–99)
GLUCOSE FLD-MCNC: 89 MG/DL — SIGNIFICANT CHANGE UP
GLUCOSE FLD-MCNC: 89 MG/DL — SIGNIFICANT CHANGE UP
GLUCOSE SERPL-MCNC: 88 MG/DL — SIGNIFICANT CHANGE UP (ref 70–99)
GLUCOSE SERPL-MCNC: 88 MG/DL — SIGNIFICANT CHANGE UP (ref 70–99)
HCT VFR BLD CALC: 30.7 % — LOW (ref 39–50)
HCT VFR BLD CALC: 30.7 % — LOW (ref 39–50)
HGB BLD-MCNC: 10.2 G/DL — LOW (ref 13–17)
HGB BLD-MCNC: 10.2 G/DL — LOW (ref 13–17)
LDH SERPL L TO P-CCNC: 1078 U/L — SIGNIFICANT CHANGE UP
LDH SERPL L TO P-CCNC: 1078 U/L — SIGNIFICANT CHANGE UP
LYMPHOCYTES # FLD: 1 % — SIGNIFICANT CHANGE UP
LYMPHOCYTES # FLD: 1 % — SIGNIFICANT CHANGE UP
MAGNESIUM SERPL-MCNC: 2.4 MG/DL — SIGNIFICANT CHANGE UP (ref 1.6–2.6)
MAGNESIUM SERPL-MCNC: 2.4 MG/DL — SIGNIFICANT CHANGE UP (ref 1.6–2.6)
MCHC RBC-ENTMCNC: 32.6 PG — SIGNIFICANT CHANGE UP (ref 27–34)
MCHC RBC-ENTMCNC: 32.6 PG — SIGNIFICANT CHANGE UP (ref 27–34)
MCHC RBC-ENTMCNC: 33.2 GM/DL — SIGNIFICANT CHANGE UP (ref 32–36)
MCHC RBC-ENTMCNC: 33.2 GM/DL — SIGNIFICANT CHANGE UP (ref 32–36)
MCV RBC AUTO: 98.1 FL — SIGNIFICANT CHANGE UP (ref 80–100)
MCV RBC AUTO: 98.1 FL — SIGNIFICANT CHANGE UP (ref 80–100)
MONOS+MACROS # FLD: 6 % — SIGNIFICANT CHANGE UP
MONOS+MACROS # FLD: 6 % — SIGNIFICANT CHANGE UP
NEUTROPHILS-BODY FLUID: 93 % — SIGNIFICANT CHANGE UP
NEUTROPHILS-BODY FLUID: 93 % — SIGNIFICANT CHANGE UP
NRBC # BLD: 0 /100 WBCS — SIGNIFICANT CHANGE UP (ref 0–0)
NRBC # BLD: 0 /100 WBCS — SIGNIFICANT CHANGE UP (ref 0–0)
PHOSPHATE SERPL-MCNC: 7.5 MG/DL — HIGH (ref 2.5–4.5)
PHOSPHATE SERPL-MCNC: 7.5 MG/DL — HIGH (ref 2.5–4.5)
PLATELET # BLD AUTO: 220 K/UL — SIGNIFICANT CHANGE UP (ref 150–400)
PLATELET # BLD AUTO: 220 K/UL — SIGNIFICANT CHANGE UP (ref 150–400)
POTASSIUM SERPL-MCNC: 4.8 MMOL/L — SIGNIFICANT CHANGE UP (ref 3.5–5.3)
POTASSIUM SERPL-MCNC: 4.8 MMOL/L — SIGNIFICANT CHANGE UP (ref 3.5–5.3)
POTASSIUM SERPL-SCNC: 4.8 MMOL/L — SIGNIFICANT CHANGE UP (ref 3.5–5.3)
POTASSIUM SERPL-SCNC: 4.8 MMOL/L — SIGNIFICANT CHANGE UP (ref 3.5–5.3)
PROT FLD-MCNC: 3.8 G/DL — SIGNIFICANT CHANGE UP
PROT FLD-MCNC: 3.8 G/DL — SIGNIFICANT CHANGE UP
RBC # BLD: 3.13 M/UL — LOW (ref 4.2–5.8)
RBC # BLD: 3.13 M/UL — LOW (ref 4.2–5.8)
RBC # FLD: 14.9 % — HIGH (ref 10.3–14.5)
RBC # FLD: 14.9 % — HIGH (ref 10.3–14.5)
RCV VOL RI: <2000 CELLS/UL — HIGH (ref 0–5)
RCV VOL RI: <2000 CELLS/UL — HIGH (ref 0–5)
SODIUM SERPL-SCNC: 139 MMOL/L — SIGNIFICANT CHANGE UP (ref 135–145)
SODIUM SERPL-SCNC: 139 MMOL/L — SIGNIFICANT CHANGE UP (ref 135–145)
TOTAL NUCLEATED CELL COUNT, BODY FLUID: 990 CELLS/UL — HIGH (ref 0–5)
TOTAL NUCLEATED CELL COUNT, BODY FLUID: 990 CELLS/UL — HIGH (ref 0–5)
TUBE TYPE: SIGNIFICANT CHANGE UP
TUBE TYPE: SIGNIFICANT CHANGE UP
WBC # BLD: 6.35 K/UL — SIGNIFICANT CHANGE UP (ref 3.8–10.5)
WBC # BLD: 6.35 K/UL — SIGNIFICANT CHANGE UP (ref 3.8–10.5)
WBC # FLD AUTO: 6.35 K/UL — SIGNIFICANT CHANGE UP (ref 3.8–10.5)
WBC # FLD AUTO: 6.35 K/UL — SIGNIFICANT CHANGE UP (ref 3.8–10.5)

## 2023-11-25 PROCEDURE — 93321 DOPPLER ECHO F-UP/LMTD STD: CPT | Mod: 26

## 2023-11-25 PROCEDURE — 99232 SBSQ HOSP IP/OBS MODERATE 35: CPT | Mod: GC

## 2023-11-25 PROCEDURE — 88189 FLOWCYTOMETRY/READ 16 & >: CPT

## 2023-11-25 PROCEDURE — 88291 CYTO/MOLECULAR REPORT: CPT

## 2023-11-25 PROCEDURE — 99233 SBSQ HOSP IP/OBS HIGH 50: CPT

## 2023-11-25 PROCEDURE — 93308 TTE F-UP OR LMTD: CPT | Mod: 26

## 2023-11-25 RX ORDER — CLOPIDOGREL BISULFATE 75 MG/1
75 TABLET, FILM COATED ORAL DAILY
Refills: 0 | Status: DISCONTINUED | OUTPATIENT
Start: 2023-11-25 | End: 2023-12-01

## 2023-11-25 RX ADMIN — Medication 81 MILLIGRAM(S): at 11:05

## 2023-11-25 RX ADMIN — Medication 1334 MILLIGRAM(S): at 17:19

## 2023-11-25 RX ADMIN — BUDESONIDE AND FORMOTEROL FUMARATE DIHYDRATE 2 PUFF(S): 160; 4.5 AEROSOL RESPIRATORY (INHALATION) at 05:26

## 2023-11-25 RX ADMIN — Medication 1 TABLET(S): at 11:05

## 2023-11-25 RX ADMIN — CLOPIDOGREL BISULFATE 75 MILLIGRAM(S): 75 TABLET, FILM COATED ORAL at 11:05

## 2023-11-25 RX ADMIN — Medication 25 MILLIGRAM(S): at 05:25

## 2023-11-25 RX ADMIN — TAMSULOSIN HYDROCHLORIDE 0.4 MILLIGRAM(S): 0.4 CAPSULE ORAL at 21:02

## 2023-11-25 RX ADMIN — ONDANSETRON 4 MILLIGRAM(S): 8 TABLET, FILM COATED ORAL at 15:17

## 2023-11-25 RX ADMIN — Medication 1334 MILLIGRAM(S): at 12:06

## 2023-11-25 RX ADMIN — BUDESONIDE AND FORMOTEROL FUMARATE DIHYDRATE 2 PUFF(S): 160; 4.5 AEROSOL RESPIRATORY (INHALATION) at 17:20

## 2023-11-25 RX ADMIN — Medication 137 MICROGRAM(S): at 05:26

## 2023-11-25 RX ADMIN — ATORVASTATIN CALCIUM 20 MILLIGRAM(S): 80 TABLET, FILM COATED ORAL at 21:02

## 2023-11-25 RX ADMIN — Medication 1334 MILLIGRAM(S): at 08:26

## 2023-11-25 RX ADMIN — TIOTROPIUM BROMIDE 2 PUFF(S): 18 CAPSULE ORAL; RESPIRATORY (INHALATION) at 11:07

## 2023-11-25 NOTE — PROGRESS NOTE ADULT - PROBLEM SELECTOR PLAN 1
New posterior pericardial effusion seen while getting C (11/16). Unknown etiology as of yet  - Hemodynamically stable  - Continue to hold Plavix. Will hold DVT ppx as well.   - s/p pericardial drain placement on 11/24, approximately 800 c of serious fluid was aspirated  - 11/25: [id number] cc of straw-colored fluid from the pericardial drain  - continue to monitor output from pericardial drain  - collect pericardial fluid studies-- gram stain, LDH, protein, cytology  - f/u limited TTE  - cardiology onboard

## 2023-11-25 NOTE — PROGRESS NOTE ADULT - SUBJECTIVE AND OBJECTIVE BOX
Angelina Davis MD  Hospitalist  Available on MS Teams      PROGRESS NOTE:     Patient is a 87y old  Male who presents with a chief complaint of Pericardial drain (25 Nov 2023 13:28)      SUBJECTIVE / OVERNIGHT EVENTS:  No acute events overnight.  Pt this morning has no new symptoms. Continues to have generalized fatigue, unchanged after pericardial drain placement yesterday.     MEDICATIONS  (STANDING):  aspirin enteric coated 81 milliGRAM(s) Oral daily  atorvastatin 20 milliGRAM(s) Oral at bedtime  budesonide  80 MICROgram(s)/formoterol 4.5 MICROgram(s) Inhaler 2 Puff(s) Inhalation two times a day  calcium acetate 1334 milliGRAM(s) Oral three times a day with meals  clopidogrel Tablet 75 milliGRAM(s) Oral daily  levothyroxine 137 MICROGram(s) Oral daily  metoprolol succinate ER 25 milliGRAM(s) Oral daily  Nephro-lenore 1 Tablet(s) Oral daily  tamsulosin 0.4 milliGRAM(s) Oral at bedtime  tiotropium 2.5 MICROgram(s) Inhaler 2 Puff(s) Inhalation daily    MEDICATIONS  (PRN):  albuterol    90 MICROgram(s) HFA Inhaler 2 Puff(s) Inhalation every 6 hours PRN Shortness of Breath and/or Wheezing  fentaNYL    Injectable 25 MICROGram(s) IV Push every 5 minutes PRN Moderate Pain (4 - 6)  guaiFENesin Oral Liquid (Sugar-Free) 200 milliGRAM(s) Oral every 6 hours PRN Cough  melatonin 3 milliGRAM(s) Oral at bedtime PRN Insomnia  midodrine. 10 milliGRAM(s) Oral <User Schedule> PRN Low BP prior to HD  ondansetron Injectable 4 milliGRAM(s) IV Push every 8 hours PRN Nausea and/or Vomiting  oxyCODONE    IR 10 milliGRAM(s) Oral every 6 hours PRN Severe Pain (7 - 10)      CAPILLARY BLOOD GLUCOSE        I&O's Summary    24 Nov 2023 07:01  -  25 Nov 2023 07:00  --------------------------------------------------------  IN: 0 mL / OUT: 150 mL / NET: -150 mL        PHYSICAL EXAM:  Vital Signs Last 24 Hrs  T(C): 37.1 (25 Nov 2023 13:50), Max: 37.1 (25 Nov 2023 13:50)  T(F): 98.7 (25 Nov 2023 13:50), Max: 98.7 (25 Nov 2023 13:50)  HR: 69 (25 Nov 2023 13:50) (64 - 73)  BP: 135/58 (25 Nov 2023 13:50) (128/58 - 165/64)  BP(mean): --  RR: 18 (25 Nov 2023 13:50) (18 - 18)  SpO2: 92% (25 Nov 2023 13:50) (92% - 97%)    Parameters below as of 25 Nov 2023 13:50  Patient On (Oxygen Delivery Method): nasal cannula  O2 Flow (L/min): 3    CONSTITUTIONAL: Well-developed, well-groomed, in no apparent distress  RESPIRATORY: Breathing comfortably; lungs CTA without wheeze/rhonchi/rales  CARDIOVASCULAR: +S1S2, RRR, no M/G/R; no lower extremity edema. There is pericardial drain in the chest, area is dressed and appears clean. There is about 150 cc of straw-colored pericardial fluid in the drain.  VASCULAR: +left upper extremity fistula with palpable thrill  GASTROINTESTINAL: No palpable masses or tenderness  NEUROLOGIC: moves all extremities spontaneously  PSYCHIATRIC: A+O x 4; mood and affect appropriate; appropriate insight and judgment    LABS:                        10.2   6.35  )-----------( 220      ( 25 Nov 2023 06:14 )             30.7     11-25    139  |  96  |  66<H>  ----------------------------<  88  4.8   |  24  |  10.64<H>    Ca    8.3<L>      25 Nov 2023 06:14  Phos  7.5     11-25  Mg     2.4     11-25      PT/INR - ( 24 Nov 2023 07:33 )   PT: 11.3 sec;   INR: 1.08 ratio         PTT - ( 24 Nov 2023 07:33 )  PTT:31.4 sec      Urinalysis Basic - ( 25 Nov 2023 06:14 )    Color: x / Appearance: x / SG: x / pH: x  Gluc: 88 mg/dL / Ketone: x  / Bili: x / Urobili: x   Blood: x / Protein: x / Nitrite: x   Leuk Esterase: x / RBC: x / WBC x   Sq Epi: x / Non Sq Epi: x / Bacteria: x

## 2023-11-25 NOTE — PROGRESS NOTE ADULT - ASSESSMENT
87 year old male with PMHx of HTN, HLD, CAD s/p diagnostic [facility] 06/2023 showing severe mLAD disease with severe OM not amendable to stenting, prior LAD + proximal OM stent, PCI/SKYLAR of mLAD x1 (Dr. Wesley) - 08/2023, PVD, ESRD - on HD MWF who initially presented for ambulatory pericardial drain. Procedure was cancelled due to hyperkalemia to 6.4, and the patient was admitted for further care.    He is s/p nonurgent dialysis on 11/22, now potassium is normalized. Pt is now s/p IR guided pericardial drain placement, on 11/24.

## 2023-11-25 NOTE — PROGRESS NOTE ADULT - SUBJECTIVE AND OBJECTIVE BOX
[facility] DIVISION OF KIDNEY DISEASES AND HYPERTENSION   FOLLOW UP NOTE    --------------------------------------------------------------------------------    SUBJECTIVE / ROS / INTERVAL EVENTS:  - Patient seen and examined at bedside  - s/p IR guided pericardial drain on 11/24 with 800cc removed by IR initially, continues to have significant drainage  - Refused HD yesterday because he was tired after IR procedure  - plan for HD today      PAST HISTORY  --------------------------------------------------------------------------------  No significant changes to PMH, PSH, FHx, SHx, unless otherwise noted    ALLERGIES & MEDICATIONS  --------------------------------------------------------------------------------  Allergies    No Known Allergies    Intolerances    acetaminophen (Other (Mild))    Standing Inpatient Medications  aspirin enteric coated 81 milliGRAM(s) Oral daily  atorvastatin 20 milliGRAM(s) Oral at bedtime  budesonide  80 MICROgram(s)/formoterol 4.5 MICROgram(s) Inhaler 2 Puff(s) Inhalation two times a day  calcium acetate 1334 milliGRAM(s) Oral three times a day with meals  clopidogrel Tablet 75 milliGRAM(s) Oral daily  levothyroxine 137 MICROGram(s) Oral daily  metoprolol succinate ER 25 milliGRAM(s) Oral daily  Nephro-lenore 1 Tablet(s) Oral daily  tamsulosin 0.4 milliGRAM(s) Oral at bedtime  tiotropium 2.5 MICROgram(s) Inhaler 2 Puff(s) Inhalation daily    PRN Inpatient Medications  albuterol    90 MICROgram(s) HFA Inhaler 2 Puff(s) Inhalation every 6 hours PRN  fentaNYL    Injectable 25 MICROGram(s) IV Push every 5 minutes PRN  guaiFENesin Oral Liquid (Sugar-Free) 200 milliGRAM(s) Oral every 6 hours PRN  melatonin 3 milliGRAM(s) Oral at bedtime PRN  midodrine. 10 milliGRAM(s) Oral <User Schedule> PRN  ondansetron Injectable 4 milliGRAM(s) IV Push every 8 hours PRN  oxyCODONE    IR 10 milliGRAM(s) Oral every 6 hours PRN      VITALS  --------------------------------------------------------------------------------  T(C): 36.9 (11-25-23 @ 11:00), Max: 36.9 (11-25-23 @ 11:00)  HR: 69 (11-25-23 @ 11:00) (64 - 75)  BP: 128/58 (11-25-23 @ 11:00) (118/60 - 165/64)  RR: 18 (11-25-23 @ 11:00) (13 - 18)  SpO2: 93% (11-25-23 @ 11:00) (91% - 98%)  Wt(kg): --  Height (cm): 165.1 (11-24-23 @ 12:20)  Weight (kg): 82 (11-24-23 @ 12:20)  BMI (kg/m2): 30.1 (11-24-23 @ 12:20)  BSA (m2): 1.89 (11-24-23 @ 12:20)    11-24-23 @ 07:01  -  11-25-23 @ 07:00  --------------------------------------------------------  IN: 0 mL / OUT: 150 mL / NET: -150 mL      PHYSICAL EXAM:  General: no acute distress  Neuro: no focal deficits  HEENT: NC/AT, anicteric  Pulmonary: lungs CTA B/L  Cardiovascular/Chest: +S1S2, RRR  GI/Abdomen: soft, NT/ND, +bowel sounds  Extremities: No edema  Skin: Warm and dry  Vascular access: LUE AVF with palpable thrill    LABS/STUDIES  --------------------------------------------------------------------------------              10.2   6.35  >-----------<  220      [11-25-23 @ 06:14]              30.7     139  |  96  |  66  ----------------------------<  88      [11-25-23 @ 06:14]  4.8   |  24  |  10.64        Ca     8.3     [11-25-23 @ 06:14]      Mg     2.4     [11-25-23 @ 06:14]      Phos  7.5     [11-25-23 @ 06:14]      PT/INR: PT 11.3 , INR 1.08       [11-24-23 @ 07:33]  PTT: 31.4       [11-24-23 @ 07:33]      Creatinine Trend:  SCr 10.64 [11-25 @ 06:14]  SCr 8.94 [11-24 @ 07:33]  SCr 6.11 [11-23 @ 07:12]  SCr 6.18 [11-23 @ 07:08]  SCr 8.44 [11-22 @ 07:21]    Urinalysis - [11-25-23 @ 06:14]      Color  / Appearance  / SG  / pH       Gluc 88 / Ketone   / Bili  / Urobili        Blood  / Protein  / Leuk Est  / Nitrite       RBC  / WBC  / Hyaline  / Gran  / Sq Epi  / Non Sq Epi  / Bacteria       HBsAg Nonreact      [11-22-23 @ 10:05]

## 2023-11-25 NOTE — PROGRESS NOTE ADULT - ASSESSMENT
87 year old man with HTN, hypothyroidism, ESRD on HD via AVF, CAD s/p PCI to LAD 8/2023 and OM lesion not amenable to PCI who presented with angina like symptoms and was found to have large pericardial effusion. Coronary angiography showed no new obstructive CAD.  S/p pericardial drain placement by IR on 11/24/23, straw colored clear fluid, 800cc initially removed    Recommendations:  - c/w ASA, atorva 20  - plavix held for 5 days prior to procedure per IR recs. Please restart  - continue to observe output from drain, send pericardial fluid from drain for cytology-   - limited TTE today for effusion follow-up  - f/u IR recommendations regarding removal (typically < 50cc in 24h)  - c/w Toprol 25mg  - c/w midodrine on dialysis days    Note not finalized until cosigned by attending 87 year old man with HTN, hypothyroidism, ESRD on HD via AVF, CAD s/p PCI to LAD 8/2023 and OM lesion not amenable to PCI who presented with angina like symptoms and was found to have large pericardial effusion. Coronary angiography showed no new obstructive CAD.  S/p pericardial drain placement by IR on 11/24/23, straw colored clear fluid, 800cc initially removed    Recommendations:  - c/w ASA, atorva 20  - plavix held for 5 days prior to procedure per IR recs. Please restart  - continue to observe output from drain, send pericardial fluid from drain for cytology  - limited TTE today for effusion follow-up  - f/u IR recommendations regarding removal (typically < 50cc in 24h). Still significant output overnight  - c/w Toprol 25mg  - c/w midodrine on dialysis days    Note not finalized until cosigned by attending

## 2023-11-25 NOTE — PROGRESS NOTE ADULT - ASSESSMENT
87 year old man with HTN, hypothyroidism, ESRD on HD via AVF, CAD s/p PCI to LAD 8/2023 and OM lesion not amenable to PCI who presented with angina like symptoms and was found to have large pericardial effusion. Coronary angiography showed no new obstructive CAD.  S/p pericardial drain placement by IR on 11/24/23, straw colored clear fluid, 800cc initially removed.      ESRD on HD MWF  Last HD 11/22 with 2L UF.   Plan for HD today to make up for skipped session yesterday and then restart MWF schedule. Goal 2L UF today with Midodrine prior to HD.  Pericardial effusion s/p IR drain, hemodynamically stable. Management per cardiology.    Anemia of renal disease: Hgb stable 9-10, at goal. Can check iron studies to make sure not deficient.    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 On Phoslo 1334mg TID. Can check PTH and vitamin D level.      Gavino Amaro, DO  Nephrology Fellow  Feel free to contact me directly on TEAMS with any questions.  (After 5pm or on weekends, please call the on-call fellow).

## 2023-11-25 NOTE — PROGRESS NOTE ADULT - PROBLEM SELECTOR PLAN 2
On HD M,W,F. AVF fistula on the LUE  Nephrologist: Dr. Sandra Monte  - Nephrology consulted  - c/w midodrine 10mg MWF for dialysis as needed.

## 2023-11-25 NOTE — PROGRESS NOTE ADULT - SUBJECTIVE AND OBJECTIVE BOX
Bulmaro Dutta MD  Cardiology Fellow  [phone]  All Cardiology service information can be found 24/7 on [email or web address], password: [email or web address]    Patient seen and examined at bedside.  800cc removed by IR initially 11/24  AF HR 60s-70s BP 110s-160s/60s, satting % Ra  Hgb stable 9.7 > 10.2  Drain output:     Review Of Systems: No chest pain, shortness of breath, or palpitations            Current Meds:  albuterol    90 MICROgram(s) HFA Inhaler 2 Puff(s) Inhalation every 6 hours PRN  aspirin enteric coated 81 milliGRAM(s) Oral daily  atorvastatin 20 milliGRAM(s) Oral at bedtime  budesonide  80 MICROgram(s)/formoterol 4.5 MICROgram(s) Inhaler 2 Puff(s) Inhalation two times a day  calcium acetate 1334 milliGRAM(s) Oral three times a day with meals  fentaNYL    Injectable 25 MICROGram(s) IV Push every 5 minutes PRN  guaiFENesin Oral Liquid (Sugar-Free) 200 milliGRAM(s) Oral every 6 hours PRN  levothyroxine 137 MICROGram(s) Oral daily  melatonin 3 milliGRAM(s) Oral at bedtime PRN  metoprolol succinate ER 25 milliGRAM(s) Oral daily  midodrine. 10 milliGRAM(s) Oral <User Schedule> PRN  Nephro-lenore 1 Tablet(s) Oral daily  ondansetron Injectable 4 milliGRAM(s) IV Push every 8 hours PRN  oxyCODONE    IR 10 milliGRAM(s) Oral every 6 hours PRN  tamsulosin 0.4 milliGRAM(s) Oral at bedtime  tiotropium 2.5 MICROgram(s) Inhaler 2 Puff(s) Inhalation daily    Vitals:  T(F): 97.7 (11-25), Max: 97.9 (11-24)  HR: 64 (11-25) (64 - 80)  BP: 165/64 (11-25) (118/60 - 165/64)  RR: 18 (11-25)  SpO2: 93% (11-25)  I&O's Summary    23 Nov 2023 07:01  -  24 Nov 2023 07:00  --------------------------------------------------------  IN: 240 mL / OUT: 0 mL / NET: 240 mL    Physical Exam:  GENERAL: NAD  HEAD: Atraumatic, Normocephalic.  ENT: Moist mucous membranes.  NECK: No JVD.  CHEST/LUNG: Clear to auscultation, Unlabored respirations.  HEART: Regular rate and rhythm; No murmur  ABDOMEN: Soft, Nontender  EXTREMITIES:  warm., UE AVF  PSYCH: Normal affect.  SKIN: No rashes or lesions.                        10.2   6.35  )-----------( 220      ( 25 Nov 2023 06:14 )             30.7     11-25    139  |  96  |  66<H>  ----------------------------<  88  4.8   |  24  |  10.64<H>    Ca    8.3<L>      25 Nov 2023 06:14  Phos  7.5     11-25  Mg     2.4     11-25      PT/INR - ( 24 Nov 2023 07:33 )   PT: 11.3 sec;   INR: 1.08 ratio         PTT - ( 24 Nov 2023 07:33 )  PTT:31.4 sec    TTE 11/16:  CONCLUSIONS:  1. Large pericardial effusion noted adjacent to the posterior left ventricle, large pericardial effusion noted adjacent to the right atrium, small pericardial effusion noted adjacent to the anterior right ventricle, moderate pericardial effusion noted adjacent to the right ventricle and large pericardial effusion noted adjacent to the apex.   Bulmaro Dutta MD  Cardiology Fellow  [phone]  All Cardiology service information can be found 24/7 on [email or web address], password: [email or web address]    Patient seen and examined at bedside.  800cc removed by IR initially 11/24  AF HR 60s-70s BP 110s-160s/60s, satting % Ra  Tele: SR 60s-80s, no events  Hgb stable 9.7 > 10.2  Drain output: 900 cc since 7pm last night, straw colored  Says he has been having nausea and productive cough after drinking water with his pills, that started after procedure. Has mucinex and nausea medication ordered. No stools yet, but no abdominal pain    Review Of Systems: No chest pain, shortness of breath, or palpitations            Current Meds:  albuterol    90 MICROgram(s) HFA Inhaler 2 Puff(s) Inhalation every 6 hours PRN  aspirin enteric coated 81 milliGRAM(s) Oral daily  atorvastatin 20 milliGRAM(s) Oral at bedtime  budesonide  80 MICROgram(s)/formoterol 4.5 MICROgram(s) Inhaler 2 Puff(s) Inhalation two times a day  calcium acetate 1334 milliGRAM(s) Oral three times a day with meals  fentaNYL    Injectable 25 MICROGram(s) IV Push every 5 minutes PRN  guaiFENesin Oral Liquid (Sugar-Free) 200 milliGRAM(s) Oral every 6 hours PRN  levothyroxine 137 MICROGram(s) Oral daily  melatonin 3 milliGRAM(s) Oral at bedtime PRN  metoprolol succinate ER 25 milliGRAM(s) Oral daily  midodrine. 10 milliGRAM(s) Oral <User Schedule> PRN  Nephro-lenore 1 Tablet(s) Oral daily  ondansetron Injectable 4 milliGRAM(s) IV Push every 8 hours PRN  oxyCODONE    IR 10 milliGRAM(s) Oral every 6 hours PRN  tamsulosin 0.4 milliGRAM(s) Oral at bedtime  tiotropium 2.5 MICROgram(s) Inhaler 2 Puff(s) Inhalation daily    Vitals:  T(F): 97.7 (11-25), Max: 97.9 (11-24)  HR: 64 (11-25) (64 - 80)  BP: 165/64 (11-25) (118/60 - 165/64)  RR: 18 (11-25)  SpO2: 93% (11-25)  I&O's Summary    23 Nov 2023 07:01  -  24 Nov 2023 07:00  --------------------------------------------------------  IN: 240 mL / OUT: 0 mL / NET: 240 mL    Physical Exam:  GENERAL: NAD  HEAD: Atraumatic, Normocephalic.  ENT: Moist mucous membranes.  NECK: No JVD.  CHEST/LUNG: Clear to auscultation, Unlabored respirations.  HEART: Regular rate and rhythm; No murmur  ABDOMEN: Soft, Nontender  EXTREMITIES:  warm., UE AVF  PSYCH: Normal affect.  SKIN: No rashes or lesions.                        10.2   6.35  )-----------( 220      ( 25 Nov 2023 06:14 )             30.7     11-25    139  |  96  |  66<H>  ----------------------------<  88  4.8   |  24  |  10.64<H>    Ca    8.3<L>      25 Nov 2023 06:14  Phos  7.5     11-25  Mg     2.4     11-25      PT/INR - ( 24 Nov 2023 07:33 )   PT: 11.3 sec;   INR: 1.08 ratio         PTT - ( 24 Nov 2023 07:33 )  PTT:31.4 sec    TTE 11/16:  CONCLUSIONS:  1. Large pericardial effusion noted adjacent to the posterior left ventricle, large pericardial effusion noted adjacent to the right atrium, small pericardial effusion noted adjacent to the anterior right ventricle, moderate pericardial effusion noted adjacent to the right ventricle and large pericardial effusion noted adjacent to the apex.

## 2023-11-25 NOTE — PROGRESS NOTE ADULT - PROBLEM SELECTOR PLAN 3
Chest pain free. s/p diagnostic cath on 11/16. Has  85 % stenosis in distal Lcx. 40 % stenosis in LAD, unchanged from prior.   - c/w aspirin and statin  - restarting plavix today  - cardiology is onboard, appreciate recs

## 2023-11-26 DIAGNOSIS — R11.2 NAUSEA WITH VOMITING, UNSPECIFIED: ICD-10-CM

## 2023-11-26 LAB
ANION GAP SERPL CALC-SCNC: 15 MMOL/L — SIGNIFICANT CHANGE UP (ref 5–17)
ANION GAP SERPL CALC-SCNC: 15 MMOL/L — SIGNIFICANT CHANGE UP (ref 5–17)
BUN SERPL-MCNC: 47 MG/DL — HIGH (ref 7–23)
BUN SERPL-MCNC: 47 MG/DL — HIGH (ref 7–23)
CALCIUM SERPL-MCNC: 8.2 MG/DL — LOW (ref 8.4–10.5)
CALCIUM SERPL-MCNC: 8.2 MG/DL — LOW (ref 8.4–10.5)
CHLORIDE SERPL-SCNC: 95 MMOL/L — LOW (ref 96–108)
CHLORIDE SERPL-SCNC: 95 MMOL/L — LOW (ref 96–108)
CO2 SERPL-SCNC: 28 MMOL/L — SIGNIFICANT CHANGE UP (ref 22–31)
CO2 SERPL-SCNC: 28 MMOL/L — SIGNIFICANT CHANGE UP (ref 22–31)
CREAT SERPL-MCNC: 7.56 MG/DL — HIGH (ref 0.5–1.3)
CREAT SERPL-MCNC: 7.56 MG/DL — HIGH (ref 0.5–1.3)
EGFR: 7 ML/MIN/1.73M2 — LOW
EGFR: 7 ML/MIN/1.73M2 — LOW
GLUCOSE SERPL-MCNC: 142 MG/DL — HIGH (ref 70–99)
GLUCOSE SERPL-MCNC: 142 MG/DL — HIGH (ref 70–99)
GRAM STN FLD: SIGNIFICANT CHANGE UP
GRAM STN FLD: SIGNIFICANT CHANGE UP
HCT VFR BLD CALC: 30.8 % — LOW (ref 39–50)
HCT VFR BLD CALC: 30.8 % — LOW (ref 39–50)
HGB BLD-MCNC: 10.1 G/DL — LOW (ref 13–17)
HGB BLD-MCNC: 10.1 G/DL — LOW (ref 13–17)
MAGNESIUM SERPL-MCNC: 2.2 MG/DL — SIGNIFICANT CHANGE UP (ref 1.6–2.6)
MAGNESIUM SERPL-MCNC: 2.2 MG/DL — SIGNIFICANT CHANGE UP (ref 1.6–2.6)
MCHC RBC-ENTMCNC: 32.1 PG — SIGNIFICANT CHANGE UP (ref 27–34)
MCHC RBC-ENTMCNC: 32.1 PG — SIGNIFICANT CHANGE UP (ref 27–34)
MCHC RBC-ENTMCNC: 32.8 GM/DL — SIGNIFICANT CHANGE UP (ref 32–36)
MCHC RBC-ENTMCNC: 32.8 GM/DL — SIGNIFICANT CHANGE UP (ref 32–36)
MCV RBC AUTO: 97.8 FL — SIGNIFICANT CHANGE UP (ref 80–100)
MCV RBC AUTO: 97.8 FL — SIGNIFICANT CHANGE UP (ref 80–100)
NIGHT BLUE STAIN TISS: SIGNIFICANT CHANGE UP
NIGHT BLUE STAIN TISS: SIGNIFICANT CHANGE UP
NRBC # BLD: 0 /100 WBCS — SIGNIFICANT CHANGE UP (ref 0–0)
NRBC # BLD: 0 /100 WBCS — SIGNIFICANT CHANGE UP (ref 0–0)
PHOSPHATE SERPL-MCNC: 5.4 MG/DL — HIGH (ref 2.5–4.5)
PHOSPHATE SERPL-MCNC: 5.4 MG/DL — HIGH (ref 2.5–4.5)
PLATELET # BLD AUTO: 205 K/UL — SIGNIFICANT CHANGE UP (ref 150–400)
PLATELET # BLD AUTO: 205 K/UL — SIGNIFICANT CHANGE UP (ref 150–400)
POTASSIUM SERPL-MCNC: 4 MMOL/L — SIGNIFICANT CHANGE UP (ref 3.5–5.3)
POTASSIUM SERPL-MCNC: 4 MMOL/L — SIGNIFICANT CHANGE UP (ref 3.5–5.3)
POTASSIUM SERPL-SCNC: 4 MMOL/L — SIGNIFICANT CHANGE UP (ref 3.5–5.3)
POTASSIUM SERPL-SCNC: 4 MMOL/L — SIGNIFICANT CHANGE UP (ref 3.5–5.3)
RAPID RVP RESULT: SIGNIFICANT CHANGE UP
RAPID RVP RESULT: SIGNIFICANT CHANGE UP
RBC # BLD: 3.15 M/UL — LOW (ref 4.2–5.8)
RBC # BLD: 3.15 M/UL — LOW (ref 4.2–5.8)
RBC # FLD: 14.7 % — HIGH (ref 10.3–14.5)
RBC # FLD: 14.7 % — HIGH (ref 10.3–14.5)
SARS-COV-2 RNA SPEC QL NAA+PROBE: SIGNIFICANT CHANGE UP
SARS-COV-2 RNA SPEC QL NAA+PROBE: SIGNIFICANT CHANGE UP
SODIUM SERPL-SCNC: 138 MMOL/L — SIGNIFICANT CHANGE UP (ref 135–145)
SODIUM SERPL-SCNC: 138 MMOL/L — SIGNIFICANT CHANGE UP (ref 135–145)
SPECIMEN SOURCE: SIGNIFICANT CHANGE UP
WBC # BLD: 6.06 K/UL — SIGNIFICANT CHANGE UP (ref 3.8–10.5)
WBC # BLD: 6.06 K/UL — SIGNIFICANT CHANGE UP (ref 3.8–10.5)
WBC # FLD AUTO: 6.06 K/UL — SIGNIFICANT CHANGE UP (ref 3.8–10.5)
WBC # FLD AUTO: 6.06 K/UL — SIGNIFICANT CHANGE UP (ref 3.8–10.5)

## 2023-11-26 PROCEDURE — 99233 SBSQ HOSP IP/OBS HIGH 50: CPT

## 2023-11-26 PROCEDURE — 74176 CT ABD & PELVIS W/O CONTRAST: CPT | Mod: 26

## 2023-11-26 PROCEDURE — 71250 CT THORAX DX C-: CPT | Mod: 26

## 2023-11-26 RX ORDER — HEPARIN SODIUM 5000 [USP'U]/ML
5000 INJECTION INTRAVENOUS; SUBCUTANEOUS EVERY 8 HOURS
Refills: 0 | Status: DISCONTINUED | OUTPATIENT
Start: 2023-11-26 | End: 2023-11-28

## 2023-11-26 RX ORDER — POLYETHYLENE GLYCOL 3350 17 G/17G
17 POWDER, FOR SOLUTION ORAL DAILY
Refills: 0 | Status: DISCONTINUED | OUTPATIENT
Start: 2023-11-26 | End: 2023-11-28

## 2023-11-26 RX ADMIN — Medication 200 MILLIGRAM(S): at 01:01

## 2023-11-26 RX ADMIN — OXYCODONE HYDROCHLORIDE 10 MILLIGRAM(S): 5 TABLET ORAL at 10:12

## 2023-11-26 RX ADMIN — TIOTROPIUM BROMIDE 2 PUFF(S): 18 CAPSULE ORAL; RESPIRATORY (INHALATION) at 11:27

## 2023-11-26 RX ADMIN — Medication 137 MICROGRAM(S): at 05:07

## 2023-11-26 RX ADMIN — ONDANSETRON 4 MILLIGRAM(S): 8 TABLET, FILM COATED ORAL at 23:09

## 2023-11-26 RX ADMIN — OXYCODONE HYDROCHLORIDE 10 MILLIGRAM(S): 5 TABLET ORAL at 09:12

## 2023-11-26 RX ADMIN — Medication 81 MILLIGRAM(S): at 11:28

## 2023-11-26 RX ADMIN — CLOPIDOGREL BISULFATE 75 MILLIGRAM(S): 75 TABLET, FILM COATED ORAL at 11:28

## 2023-11-26 RX ADMIN — BUDESONIDE AND FORMOTEROL FUMARATE DIHYDRATE 2 PUFF(S): 160; 4.5 AEROSOL RESPIRATORY (INHALATION) at 05:07

## 2023-11-26 RX ADMIN — ONDANSETRON 4 MILLIGRAM(S): 8 TABLET, FILM COATED ORAL at 11:26

## 2023-11-26 RX ADMIN — Medication 1334 MILLIGRAM(S): at 12:01

## 2023-11-26 RX ADMIN — Medication 25 MILLIGRAM(S): at 05:07

## 2023-11-26 NOTE — PROGRESS NOTE ADULT - SUBJECTIVE AND OBJECTIVE BOX
Angelina Davis MD  Hospitalist  Available on MS Teams      PROGRESS NOTE:     Patient is a 87y old  Male who presents with a chief complaint of Pericardial drain (25 Nov 2023 16:23)      SUBJECTIVE / OVERNIGHT EVENTS:  Pt this morning c/o nausea. States after he had breakfast he threw up. He had nausea yesterday but it had subsided. States he was able to have lunch and dinner without issue. Denies abdominal discomfort. States he is constipated.    At bedside was a bucket containing his vomiting. Appears yellow, thick. No blood.    Pericardial drain output: 75 cc in the last 24 hours    MEDICATIONS  (STANDING):  aspirin enteric coated 81 milliGRAM(s) Oral daily  atorvastatin 20 milliGRAM(s) Oral at bedtime  budesonide  80 MICROgram(s)/formoterol 4.5 MICROgram(s) Inhaler 2 Puff(s) Inhalation two times a day  calcium acetate 1334 milliGRAM(s) Oral three times a day with meals  clopidogrel Tablet 75 milliGRAM(s) Oral daily  levothyroxine 137 MICROGram(s) Oral daily  metoprolol succinate ER 25 milliGRAM(s) Oral daily  Nephro-lenore 1 Tablet(s) Oral daily  tamsulosin 0.4 milliGRAM(s) Oral at bedtime  tiotropium 2.5 MICROgram(s) Inhaler 2 Puff(s) Inhalation daily    MEDICATIONS  (PRN):  albuterol    90 MICROgram(s) HFA Inhaler 2 Puff(s) Inhalation every 6 hours PRN Shortness of Breath and/or Wheezing  fentaNYL    Injectable 25 MICROGram(s) IV Push every 5 minutes PRN Moderate Pain (4 - 6)  guaiFENesin Oral Liquid (Sugar-Free) 200 milliGRAM(s) Oral every 6 hours PRN Cough  melatonin 3 milliGRAM(s) Oral at bedtime PRN Insomnia  midodrine. 10 milliGRAM(s) Oral <User Schedule> PRN Low BP prior to HD  ondansetron Injectable 4 milliGRAM(s) IV Push every 8 hours PRN Nausea and/or Vomiting  oxyCODONE    IR 10 milliGRAM(s) Oral every 6 hours PRN Severe Pain (7 - 10)      CAPILLARY BLOOD GLUCOSE      POCT Blood Glucose.: 124 mg/dL (25 Nov 2023 21:30)    I&O's Summary    25 Nov 2023 07:01  -  26 Nov 2023 07:00  --------------------------------------------------------  IN: 500 mL / OUT: 1975 mL / NET: -1475 mL        PHYSICAL EXAM:  Vital Signs Last 24 Hrs  T(C): 37.2 (26 Nov 2023 04:22), Max: 37.2 (26 Nov 2023 04:22)  T(F): 98.9 (26 Nov 2023 04:22), Max: 98.9 (26 Nov 2023 04:22)  HR: 76 (26 Nov 2023 04:22) (69 - 76)  BP: 137/56 (26 Nov 2023 04:22) (114/53 - 137/56)  BP(mean): --  RR: 18 (26 Nov 2023 04:40) (18 - 18)  SpO2: 96% (26 Nov 2023 04:40) (90% - 99%)    Parameters below as of 26 Nov 2023 04:40  Patient On (Oxygen Delivery Method): nasal cannula  O2 Flow (L/min): 3    CONSTITUTIONAL: Well-developed, well-groomed, in no apparent distress  RESPIRATORY: Breathing comfortably; lungs CTA without wheeze/rhonchi/rales. Occasional wet-sounding cough on exam  CARDIOVASCULAR: +S1S2, RRR, no M/G/R; no lower extremity edema. There is pericardial drain in the chest, area is dressed and appears clean. Drain contains serosanguinous fluid that appears clear.  VASCULAR: +left upper extremity fistula with palpable thrill  GASTROINTESTINAL: No palpable masses, no tenderness. Protuberant   NEUROLOGIC: moves all extremities spontaneously  PSYCHIATRIC: A+O x 4; mood and affect appropriate; appropriate insight and judgment      LABS:                        10.1   6.06  )-----------( 205      ( 26 Nov 2023 06:45 )             30.8     11-26    138  |  95<L>  |  47<H>  ----------------------------<  142<H>  4.0   |  28  |  7.56<H>    Ca    8.2<L>      26 Nov 2023 06:44  Phos  5.4     11-26  Mg     2.2     11-26            Urinalysis Basic - ( 26 Nov 2023 06:44 )    Color: x / Appearance: x / SG: x / pH: x  Gluc: 142 mg/dL / Ketone: x  / Bili: x / Urobili: x   Blood: x / Protein: x / Nitrite: x   Leuk Esterase: x / RBC: x / WBC x   Sq Epi: x / Non Sq Epi: x / Bacteria: x        Culture - Acid Fast - Body Fluid w/Smear (collected 25 Nov 2023 18:21)  Source: .Body Fluid Other    Culture - Body Fluid with Gram Stain (collected 25 Nov 2023 18:21)  Source: .Body Fluid Other  Gram Stain (26 Nov 2023 01:51):    polymorphonuclear leukocytes seen    No organisms seen    by cytocentrifuge    Culture - Fungal, Body Fluid (collected 25 Nov 2023 18:21)  Source: Pericardial Pericardial Fluid  Preliminary Report (26 Nov 2023 11:52):    Testing in progress

## 2023-11-26 NOTE — PROGRESS NOTE ADULT - PROBLEM SELECTOR PLAN 2
- continue zofran 4mg q8hr PRN for nausea  - will obtain CT chest, abdomen and pelvis to r/o obstruction  - obtain RVP to r/o URI (pt occasional coughing during exam)

## 2023-11-26 NOTE — PROGRESS NOTE ADULT - PROBLEM SELECTOR PLAN 1
New posterior pericardial effusion seen while getting LHC (11/16). Unknown etiology as of yet  - Hemodynamically stable  - restarted plavix after drain, on aspirin  - s/p pericardial drain placement on 11/24, approximately 800 c of serious fluid was aspirated  - 11/25: [id number] cc of straw-colored fluid from the pericardial drain  - 11/26: about 75 cc in the past 24 hours  - continue to monitor output from pericardial drain  - f/u pericardial fluid studies-- gram stain, LDH, protein, cytology  - limited TTE after drain placement-- small pericardial fluid   - cardiology onboard New posterior pericardial effusion seen while getting C (11/16). Unknown etiology as of yet. Pleural fluid LDH significantly elevated, concerning for exudative fluid  - Hemodynamically stable  - restarted plavix after drain, on aspirin  - s/p pericardial drain placement on 11/24, approximately 800 c of serious fluid was aspirated  - 11/25: [id number] cc of straw-colored fluid from the pericardial drain  - 11/26: about 75 cc in the past 24 hours  - continue to monitor output from pericardial drain  - f/u pericardial fluid studies-- gram stain, LDH, protein, cytology  - limited TTE after drain placement-- small pericardial fluid   - cardiology onboard

## 2023-11-26 NOTE — PROGRESS NOTE ADULT - ASSESSMENT
87 year old male with PMHx of HTN, HLD, CAD s/p diagnostic [facility] 06/2023 showing severe mLAD disease with severe OM not amendable to stenting, prior LAD + proximal OM stent, PCI/SKYLAR of mLAD x1 (Dr. Wesley) - 08/2023, PVD, ESRD - on HD MWF who initially presented for ambulatory pericardial drain. Procedure was cancelled due to hyperkalemia to 6.4, and the patient was admitted for further care.    He is s/p nonurgent dialysis on 11/22, now potassium is normalized. Pt is now s/p IR guided pericardial drain placement, on 11/24.

## 2023-11-27 LAB
ANION GAP SERPL CALC-SCNC: 20 MMOL/L — HIGH (ref 5–17)
ANION GAP SERPL CALC-SCNC: 20 MMOL/L — HIGH (ref 5–17)
BUN SERPL-MCNC: 60 MG/DL — HIGH (ref 7–23)
BUN SERPL-MCNC: 60 MG/DL — HIGH (ref 7–23)
CALCIUM SERPL-MCNC: 8.7 MG/DL — SIGNIFICANT CHANGE UP (ref 8.4–10.5)
CALCIUM SERPL-MCNC: 8.7 MG/DL — SIGNIFICANT CHANGE UP (ref 8.4–10.5)
CHLORIDE SERPL-SCNC: 94 MMOL/L — LOW (ref 96–108)
CHLORIDE SERPL-SCNC: 94 MMOL/L — LOW (ref 96–108)
CO2 SERPL-SCNC: 27 MMOL/L — SIGNIFICANT CHANGE UP (ref 22–31)
CO2 SERPL-SCNC: 27 MMOL/L — SIGNIFICANT CHANGE UP (ref 22–31)
CREAT SERPL-MCNC: 9.89 MG/DL — HIGH (ref 0.5–1.3)
CREAT SERPL-MCNC: 9.89 MG/DL — HIGH (ref 0.5–1.3)
EGFR: 5 ML/MIN/1.73M2 — LOW
EGFR: 5 ML/MIN/1.73M2 — LOW
GLUCOSE SERPL-MCNC: 89 MG/DL — SIGNIFICANT CHANGE UP (ref 70–99)
GLUCOSE SERPL-MCNC: 89 MG/DL — SIGNIFICANT CHANGE UP (ref 70–99)
HCT VFR BLD CALC: 33.3 % — LOW (ref 39–50)
HCT VFR BLD CALC: 33.3 % — LOW (ref 39–50)
HGB BLD-MCNC: 10.8 G/DL — LOW (ref 13–17)
HGB BLD-MCNC: 10.8 G/DL — LOW (ref 13–17)
MAGNESIUM SERPL-MCNC: 2.6 MG/DL — SIGNIFICANT CHANGE UP (ref 1.6–2.6)
MAGNESIUM SERPL-MCNC: 2.6 MG/DL — SIGNIFICANT CHANGE UP (ref 1.6–2.6)
MCHC RBC-ENTMCNC: 31.6 PG — SIGNIFICANT CHANGE UP (ref 27–34)
MCHC RBC-ENTMCNC: 31.6 PG — SIGNIFICANT CHANGE UP (ref 27–34)
MCHC RBC-ENTMCNC: 32.4 GM/DL — SIGNIFICANT CHANGE UP (ref 32–36)
MCHC RBC-ENTMCNC: 32.4 GM/DL — SIGNIFICANT CHANGE UP (ref 32–36)
MCV RBC AUTO: 97.4 FL — SIGNIFICANT CHANGE UP (ref 80–100)
MCV RBC AUTO: 97.4 FL — SIGNIFICANT CHANGE UP (ref 80–100)
NRBC # BLD: 0 /100 WBCS — SIGNIFICANT CHANGE UP (ref 0–0)
NRBC # BLD: 0 /100 WBCS — SIGNIFICANT CHANGE UP (ref 0–0)
PHOSPHATE SERPL-MCNC: 7.3 MG/DL — HIGH (ref 2.5–4.5)
PHOSPHATE SERPL-MCNC: 7.3 MG/DL — HIGH (ref 2.5–4.5)
PLATELET # BLD AUTO: 238 K/UL — SIGNIFICANT CHANGE UP (ref 150–400)
PLATELET # BLD AUTO: 238 K/UL — SIGNIFICANT CHANGE UP (ref 150–400)
POTASSIUM SERPL-MCNC: 4.6 MMOL/L — SIGNIFICANT CHANGE UP (ref 3.5–5.3)
POTASSIUM SERPL-MCNC: 4.6 MMOL/L — SIGNIFICANT CHANGE UP (ref 3.5–5.3)
POTASSIUM SERPL-SCNC: 4.6 MMOL/L — SIGNIFICANT CHANGE UP (ref 3.5–5.3)
POTASSIUM SERPL-SCNC: 4.6 MMOL/L — SIGNIFICANT CHANGE UP (ref 3.5–5.3)
RBC # BLD: 3.42 M/UL — LOW (ref 4.2–5.8)
RBC # BLD: 3.42 M/UL — LOW (ref 4.2–5.8)
RBC # FLD: 14.4 % — SIGNIFICANT CHANGE UP (ref 10.3–14.5)
RBC # FLD: 14.4 % — SIGNIFICANT CHANGE UP (ref 10.3–14.5)
SODIUM SERPL-SCNC: 141 MMOL/L — SIGNIFICANT CHANGE UP (ref 135–145)
SODIUM SERPL-SCNC: 141 MMOL/L — SIGNIFICANT CHANGE UP (ref 135–145)
TM INTERPRETATION: SIGNIFICANT CHANGE UP
TM INTERPRETATION: SIGNIFICANT CHANGE UP
WBC # BLD: 6.42 K/UL — SIGNIFICANT CHANGE UP (ref 3.8–10.5)
WBC # BLD: 6.42 K/UL — SIGNIFICANT CHANGE UP (ref 3.8–10.5)
WBC # FLD AUTO: 6.42 K/UL — SIGNIFICANT CHANGE UP (ref 3.8–10.5)
WBC # FLD AUTO: 6.42 K/UL — SIGNIFICANT CHANGE UP (ref 3.8–10.5)

## 2023-11-27 PROCEDURE — 99233 SBSQ HOSP IP/OBS HIGH 50: CPT

## 2023-11-27 PROCEDURE — 99232 SBSQ HOSP IP/OBS MODERATE 35: CPT | Mod: GC

## 2023-11-27 PROCEDURE — 93010 ELECTROCARDIOGRAM REPORT: CPT

## 2023-11-27 PROCEDURE — 93308 TTE F-UP OR LMTD: CPT | Mod: 26

## 2023-11-27 PROCEDURE — 99233 SBSQ HOSP IP/OBS HIGH 50: CPT | Mod: GC

## 2023-11-27 RX ORDER — CHLORHEXIDINE GLUCONATE 213 G/1000ML
1 SOLUTION TOPICAL
Refills: 0 | Status: DISCONTINUED | OUTPATIENT
Start: 2023-11-27 | End: 2023-12-01

## 2023-11-27 RX ADMIN — Medication 137 MICROGRAM(S): at 05:35

## 2023-11-27 RX ADMIN — Medication 5 MILLIGRAM(S): at 22:31

## 2023-11-27 RX ADMIN — ONDANSETRON 4 MILLIGRAM(S): 8 TABLET, FILM COATED ORAL at 13:23

## 2023-11-27 RX ADMIN — POLYETHYLENE GLYCOL 3350 17 GRAM(S): 17 POWDER, FOR SOLUTION ORAL at 22:31

## 2023-11-27 RX ADMIN — BUDESONIDE AND FORMOTEROL FUMARATE DIHYDRATE 2 PUFF(S): 160; 4.5 AEROSOL RESPIRATORY (INHALATION) at 05:38

## 2023-11-27 RX ADMIN — Medication 25 MILLIGRAM(S): at 05:38

## 2023-11-27 NOTE — PROGRESS NOTE ADULT - PROBLEM SELECTOR PLAN 1
ESRD on HD MWF  - Last HD 11/25 with 2L UF. Pt looks dehydrated and so no UF. Put him back on MWF schedule.  Midodrine prior to HD. Pericardial effusion s/p IR drain, hemodynamically stable. Management per cardiology.  - Anemia of renal disease: Hgb stable 9-10, at goal. Can check iron studies to make sure not deficient.  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 On Phoslo 1334mg TID. Can check PTH and vitamin D level.

## 2023-11-27 NOTE — PROGRESS NOTE ADULT - ASSESSMENT
87 year old male with PMHx of HTN, HLD, CAD s/p diagnostic [facility] 06/2023 showing severe mLAD disease with severe OM not amendable to stenting, prior LAD + proximal OM stent, PCI/SKYLAR of mLAD x1 (Dr. Wesley) - 08/2023, PVD, ESRD - on HD MWF who initially presented for ambulatory pericardial drain. Procedure was cancelled due to hyperkalemia to 6.4, and the patient was admitted for further care.  He is s/p nonurgent dialysis on 11/22, now potassium is normalized. Pt is now s/p IR guided pericardial drain placement, on 11/24.

## 2023-11-27 NOTE — PROGRESS NOTE ADULT - SUBJECTIVE AND OBJECTIVE BOX
Cardiology Progress Note  ------------------------------------------------------------------------------------------  SUBJECTIVE:   - No events overnight.   - Feels uncomfortable with pericardial drain in place. endorsed nausea yesterday and today exacerbated by pain  - pericardial drain continuing to drain blood tinged serous fluid    VS:  T(F): 98 (11-27), Max: 98.4 (11-26)  HR: 71 (11-27) (71 - 77)  BP: 162/72 (11-27) (134/59 - 162/72)  RR: 18 (11-27)  SpO2: 95% (11-27)  I&O's Summary    26 Nov 2023 07:01 - 27 Nov 2023 07:00  --------------------------------------------------------  IN: 0 mL / OUT: 22 mL / NET: -22 mL    27 Nov 2023 07:01  -  27 Nov 2023 10:59  --------------------------------------------------------  IN: 0 mL / OUT: 0 mL / NET: 0 mL      PHYSICAL EXAM:  GENERAL: NAD  HEAD: Atraumatic, Normocephalic.  ENT: Moist mucous membranes.  NECK: No JVD.  CHEST/LUNG: Clear to auscultation, Unlabored respirations.  HEART: Regular rate and rhythm; No murmur  ABDOMEN: Soft, Nontender  EXTREMITIES:  warm., UE AVF  PSYCH: Normal affect.  SKIN: No rashes or lesions.  -------------------------------------------------------------------------------------------  LABS:                          10.8   6.42  )-----------( 238      ( 27 Nov 2023 05:38 )             33.3     11-27    141  |  94<L>  |  60<H>  ----------------------------<  89  4.6   |  27  |  9.89<H>    Ca    8.7      27 Nov 2023 05:37  Phos  7.3     11-27  Mg     2.6     11-27    -------------------------------------------------------------------------------------------  Meds:  albuterol    90 MICROgram(s) HFA Inhaler 2 Puff(s) Inhalation every 6 hours PRN  aspirin enteric coated 81 milliGRAM(s) Oral daily  atorvastatin 20 milliGRAM(s) Oral at bedtime  bisacodyl 5 milliGRAM(s) Oral every 12 hours PRN  budesonide  80 MICROgram(s)/formoterol 4.5 MICROgram(s) Inhaler 2 Puff(s) Inhalation two times a day  calcium acetate 1334 milliGRAM(s) Oral three times a day with meals  clopidogrel Tablet 75 milliGRAM(s) Oral daily  fentaNYL    Injectable 25 MICROGram(s) IV Push every 5 minutes PRN  guaiFENesin Oral Liquid (Sugar-Free) 200 milliGRAM(s) Oral every 6 hours PRN  heparin   Injectable 5000 Unit(s) SubCutaneous every 8 hours  levothyroxine 137 MICROGram(s) Oral daily  melatonin 3 milliGRAM(s) Oral at bedtime PRN  metoprolol succinate ER 25 milliGRAM(s) Oral daily  midodrine. 10 milliGRAM(s) Oral <User Schedule> PRN  Nephro-lenore 1 Tablet(s) Oral daily  ondansetron Injectable 4 milliGRAM(s) IV Push every 8 hours PRN  oxyCODONE    IR 10 milliGRAM(s) Oral every 6 hours PRN  polyethylene glycol 3350 17 Gram(s) Oral daily PRN  tamsulosin 0.4 milliGRAM(s) Oral at bedtime  tiotropium 2.5 MICROgram(s) Inhaler 2 Puff(s) Inhalation daily     Cardiology Progress Note  ------------------------------------------------------------------------------------------  SUBJECTIVE:   - No events overnight.   - Feels uncomfortable with pericardial drain in place. Reports nausea yesterday and today, exacerbated by pain  - pericardial drain continuing to drain blood tinged serous fluid      -------------------------------------------------------------------------------------------  Meds:  albuterol    90 MICROgram(s) HFA Inhaler 2 Puff(s) Inhalation every 6 hours PRN  aspirin enteric coated 81 milliGRAM(s) Oral daily  atorvastatin 20 milliGRAM(s) Oral at bedtime  bisacodyl 5 milliGRAM(s) Oral every 12 hours PRN  budesonide  80 MICROgram(s)/formoterol 4.5 MICROgram(s) Inhaler 2 Puff(s) Inhalation two times a day  calcium acetate 1334 milliGRAM(s) Oral three times a day with meals  clopidogrel Tablet 75 milliGRAM(s) Oral daily  fentaNYL    Injectable 25 MICROGram(s) IV Push every 5 minutes PRN  guaiFENesin Oral Liquid (Sugar-Free) 200 milliGRAM(s) Oral every 6 hours PRN  heparin   Injectable 5000 Unit(s) SubCutaneous every 8 hours  levothyroxine 137 MICROGram(s) Oral daily  melatonin 3 milliGRAM(s) Oral at bedtime PRN  metoprolol succinate ER 25 milliGRAM(s) Oral daily  midodrine. 10 milliGRAM(s) Oral <User Schedule> PRN  Nephro-lenore 1 Tablet(s) Oral daily  ondansetron Injectable 4 milliGRAM(s) IV Push every 8 hours PRN  oxyCODONE    IR 10 milliGRAM(s) Oral every 6 hours PRN  polyethylene glycol 3350 17 Gram(s) Oral daily PRN  tamsulosin 0.4 milliGRAM(s) Oral at bedtime  tiotropium 2.5 MICROgram(s) Inhaler 2 Puff(s) Inhalation daily          VS:  T(F): 98 (11-27), Max: 98.4 (11-26)  HR: 71 (11-27) (71 - 77)  BP: 162/72 (11-27) (134/59 - 162/72)  RR: 18 (11-27)  SpO2: 95% (11-27)    I&O's Summary  26 Nov 2023 07:01  -  27 Nov 2023 07:00  --------------------------------------------------------  IN: 0 mL / OUT: 22 mL / NET: -22 mL      PHYSICAL EXAM:  GENERAL: NAD  HEAD: Atraumatic, Normocephalic.  ENT: Moist mucous membranes.  NECK: No JVD.  CHEST/LUNG: Clear to auscultation, Unlabored respirations.  HEART: Regular rate and rhythm; No murmur  ABDOMEN: Soft, Nontender  EXTREMITIES:  warm., UE AVF  PSYCH: Normal affect.  SKIN: No rashes or lesions.  -------------------------------------------------------------------------------------------    LABS:                      10.8   6.42  )-----------( 238      ( 27 Nov 2023 05:38 )             33.3     11-27  141  |  94<L>  |  60<H>  ----------------------------<  89  4.6   |  27  |  9.89<H>    Ca    8.7      27 Nov 2023 05:37  Phos  7.3     11-27  Mg     2.6     11-27

## 2023-11-27 NOTE — PHYSICAL THERAPY INITIAL EVALUATION ADULT - PLANNED THERAPY INTERVENTIONS, PT EVAL
1. GOAL: In 3 weeks, pt will be able to navigate 3 steps independently./bed mobility training/gait training/transfer training

## 2023-11-27 NOTE — PROGRESS NOTE ADULT - SUBJECTIVE AND OBJECTIVE BOX
SUBJECTIVE / OVERNIGHT EVENTS:  Today is hospital day 6d. There are no new issues or overnight events.   Denies any headache, lightheadedness, vertigo, shortness of breathe, cough, chest pain, palpitations, tachycardia, abdominal pain, nausea, vomiting, diarrhea or constipation currently    HPI:  86yo Male with PMHx of HTN, HLD, CAD s/p diagnostic LHC 06/2023 showing severe mLAD disease with severe OM not amendable to stenting, prior LAD + proximal OM stent, PCI/SKYLAR of mLAD x1 (Dr. Wesley) - 08/2023, PVD, LVEF 65-70%, ESRD - on HD diagnosed 6-years ago MWF schedule via AVF left elbow w/ last session 11/20/2023 at [facility] [place] and follows with Dr. Sandra Monte, and anemia presenting to the hospital for pericardial drain. Procedure cancelled 2/2 hyperkalemia. States tolerated full 3 hr session of HD yesterday. Of note pt was recently admitted for evaluation of worsening CAD w/ CP, SOB, exertional dyspnea. He underwent a LHC and was found to non obstructive CAD. Additionally he was found to have a predominantly posterior pericardial effusion, confirmed on TTE. After discussion between cardiology and IR, the patient is planned for a pericardial window outpatient Tuesday, 11/21 after holding Plavix for 5-days via interventional radiology    (21 Nov 2023 13:19)    MEDICATIONS  (STANDING):  aspirin enteric coated 81 milliGRAM(s) Oral daily  atorvastatin 20 milliGRAM(s) Oral at bedtime  budesonide  80 MICROgram(s)/formoterol 4.5 MICROgram(s) Inhaler 2 Puff(s) Inhalation two times a day  calcium acetate 1334 milliGRAM(s) Oral three times a day with meals  chlorhexidine 2% Cloths 1 Application(s) Topical <User Schedule>  clopidogrel Tablet 75 milliGRAM(s) Oral daily  heparin   Injectable 5000 Unit(s) SubCutaneous every 8 hours  levothyroxine 137 MICROGram(s) Oral daily  metoprolol succinate ER 25 milliGRAM(s) Oral daily  Nephro-lenore 1 Tablet(s) Oral daily  tamsulosin 0.4 milliGRAM(s) Oral at bedtime  tiotropium 2.5 MICROgram(s) Inhaler 2 Puff(s) Inhalation daily    MEDICATIONS  (PRN):  albuterol    90 MICROgram(s) HFA Inhaler 2 Puff(s) Inhalation every 6 hours PRN Shortness of Breath and/or Wheezing  bisacodyl 5 milliGRAM(s) Oral every 12 hours PRN Constipation  fentaNYL    Injectable 25 MICROGram(s) IV Push every 5 minutes PRN Moderate Pain (4 - 6)  guaiFENesin Oral Liquid (Sugar-Free) 200 milliGRAM(s) Oral every 6 hours PRN Cough  melatonin 3 milliGRAM(s) Oral at bedtime PRN Insomnia  midodrine. 10 milliGRAM(s) Oral <User Schedule> PRN Low BP prior to HD  ondansetron Injectable 4 milliGRAM(s) IV Push every 8 hours PRN Nausea and/or Vomiting  oxyCODONE    IR 10 milliGRAM(s) Oral every 6 hours PRN Severe Pain (7 - 10)  polyethylene glycol 3350 17 Gram(s) Oral daily PRN Constipation    HOME MEDICATIONS:  albuterol 90 mcg/inh inhalation aerosol: 2 puff(s) inhaled every 6 hours As needed Shortness of Breath and/or Wheezing (21 Nov 2023 10:59)  diclofenac 18 mg oral capsule: 1 cap(s) orally once a day (21 Nov 2023 10:59)  DuoNeb 0.5 mg-2.5 mg/3 mL inhalation solution: 1 dose(s) by nebulizer every 4 hours as needed for  shortness of breath and/or wheezing (21 Nov 2023 10:59)  Lipitor 20 mg oral tablet: 1 tab(s) orally once a day (at bedtime) (21 Nov 2023 10:59)  Lokelma 10 g oral powder for reconstitution: 10 gram(s) orally Saturday and Sunday (21 Nov 2023 10:59)  midodrine 10 mg oral tablet: 2 tab(s) orally Monday, Wednesday, and Friday as needed for prior to  dialysis (21 Nov 2023 10:59)  PhosLo 667 mg oral tablet: 3 tab(s) orally 3 times a day (21 Nov 2023 10:59)  Plavix 75 mg oral tablet: 1 tab(s) orally once a day (21 Nov 2023 10:49)  Herlinda-Lenore oral tablet: 1 tab(s) orally (21 Nov 2023 10:54)  senna (sennosides) 8.6 mg oral tablet: 1 tab(s) orally once a day (at bedtime) as needed for  constipation (21 Nov 2023 10:59)  Synthroid 137 mcg (0.137 mg) oral tablet: 1 tab(s) orally once a day (21 Nov 2023 10:59)  tamsulosin 0.4 mg oral capsule: 1 cap(s) orally once a day (at bedtime) (21 Nov 2023 10:59)  Toprol-XL 25 mg oral tablet, extended release: 1 tab(s) orally once a day (21 Nov 2023 10:59)  Trelegy Ellipta 100 mcg-62.5 mcg-25 mcg/inh inhalation powder: 1 puff(s) inhaled once a day (21 Nov 2023 10:59)    PHYSICAL EXAM:  Vital Signs Last 24 Hrs  T(C): 36.6 (27 Nov 2023 12:40), Max: 36.9 (26 Nov 2023 21:51)  T(F): 97.8 (27 Nov 2023 12:40), Max: 98.4 (26 Nov 2023 21:51)  HR: 84 (27 Nov 2023 12:40) (71 - 84)  BP: 150/86 (27 Nov 2023 12:40) (134/59 - 162/72)  BP(mean): --  RR: 18 (27 Nov 2023 12:40) (18 - 18)  SpO2: 93% (27 Nov 2023 12:40) (92% - 95%)    Parameters below as of 27 Nov 2023 12:40  Patient On (Oxygen Delivery Method): room air      I&O's Summary    26 Nov 2023 07:01  -  27 Nov 2023 07:00  --------------------------------------------------------  IN: 0 mL / OUT: 22 mL / NET: -22 mL    27 Nov 2023 07:01  -  27 Nov 2023 15:11  --------------------------------------------------------  IN: 0 mL / OUT: 0 mL / NET: 0 mL      CONSTITUTIONAL: Well-groomed, in no apparent distress  EYES: No conjunctival or scleral injection, non-icteric  ENMT: No external nasal lesions; Normal outer ears  NECK: Supple; Trachea midline  RESPIRATORY: Normal respiratory effort; lungs are clear to auscultation bilaterally without wheeze/rhonchi/rales  CARDIOVASCULAR: Regular rate and rhythm, normal S1 and S2, no murmur/rub/gallop; No lower extremity edema  GASTROINTESTINAL: Non-distended; No palpable masses; No tenderness; No rebound/guarding  MUSCULOSKELETAL:  Normal gait;  NEUROLOGY: A+O to person, place, and time; no gross motor deficits   PSYCHIATRY: Mood and Affect appropriate    LABS:                        10.8   6.42  )-----------( 238      ( 27 Nov 2023 05:38 )             33.3     11-27    141  |  94<L>  |  60<H>  ----------------------------<  89  4.6   |  27  |  9.89<H>    Ca    8.7      27 Nov 2023 05:37  Phos  7.3     11-27  Mg     2.6     11-27            Urinalysis Basic - ( 27 Nov 2023 05:37 )    Color: x / Appearance: x / SG: x / pH: x  Gluc: 89 mg/dL / Ketone: x  / Bili: x / Urobili: x   Blood: x / Protein: x / Nitrite: x   Leuk Esterase: x / RBC: x / WBC x   Sq Epi: x / Non Sq Epi: x / Bacteria: x        Culture - Acid Fast - Body Fluid w/Smear (collected 25 Nov 2023 18:21)  Source: .Body Fluid Other    Culture - Body Fluid with Gram Stain (collected 25 Nov 2023 18:21)  Source: .Body Fluid Other  Gram Stain (26 Nov 2023 01:51):    polymorphonuclear leukocytes seen    No organisms seen    by cytocentrifuge  Preliminary Report (26 Nov 2023 18:55):    No growth    Culture - Fungal, Body Fluid (collected 25 Nov 2023 18:21)  Source: Pericardial Pericardial Fluid  Preliminary Report (26 Nov 2023 11:52):    Testing in progress      [facility]-2: [facility] (26 Nov 2023 12:30)      RADIOLOGY & ADDITIONAL TESTS:  EKG  12 Lead ECG:   Ventricular Rate 70 BPM    Atrial Rate 70 BPM    P-R Interval 156 ms    QRS Duration 80 ms    Q-T Interval 406 ms    QTC Calculation(Bazett) 438 ms    P Axis 67 degrees    R Axis 10 degrees    T Axis 70 degrees    Diagnosis Line NORMAL SINUS RHYTHM  NORMAL ECG  WHEN COMPARED WITH ECG OF 22-JUN-2023 09:15,  NO SIGNIFICANT CHANGE WAS FOUND  Confirmed by WENDI GUPTA SAJAN ([id number]) on 11/16/2023 11:58:19 AM (11-16-23 @ 07:13)  12 Lead ECG:   Ventricular Rate 65 BPM    Atrial Rate 65 BPM    P-R Interval 152 ms    QRS Duration 84 ms    Q-T Interval 398 ms    QTC Calculation(Bazett) 413 ms    P Axis 60 degrees    R Axis 19 degrees    T Axis 56 degrees    Diagnosis Line NORMAL SINUS RHYTHM  NORMAL ECG  WHEN COMPARED WITH ECG OF 22-JUN-2023 07:30, (UNCONFIRMED)  NO SIGNIFICANT CHANGE WAS FOUND  Confirmed by MD Bhardwaj Jeffrey ([id number]) on 6/22/2023 5:34:50 PM (06-22-23 @ 09:15)    CT Chest No Cont:   ACC: [id number] EXAM:  CT ABDOMEN AND PELVIS OC   ORDERED BY: NELLIE MANZANARES     ACC: [id number] EXAM:  CT CHEST   ORDERED BY: NELLIE MANZANARES     PROCEDURE DATE:  11/26/2023          INTERPRETATION:  CLINICAL INFORMATION: End-stage renal disease.   Pericardial effusion status post drain placement. Nausea and vomiting.    COMPARISON: CT chest 11/16/2023    CONTRAST/COMPLICATIONS:  IV Contrast: NONE  Oral Contrast: Omnipaque 300  Complications: None reported at time of study completion    PROCEDURE:  CT of theChest, Abdomen and Pelvis was performed.  Sagittal and coronal reformats were performed.    FINDINGS:  CHEST:  LUNGS AND LARGE AIRWAYS: Patent central airways. Centrilobular emphysema.   A few scattered less than 5 mm pulmonary nodules as at recent prior   imaging.  PLEURA: Small bilateral pleural effusions, left greater than right, new.  VESSELS: Atherosclerotic changes of the aorta and coronary arteries.  HEART: Heart size is normal. Significant interval decrease in pericardial   effusion with only small fluid remaining. Pericardial drain in place.  MEDIASTINUM AND ODALYS: No lymphadenopathy.  CHEST WALL AND LOWER NECK: Thyroidectomy.    ABDOMEN AND PELVIS:  LIVER: A subcentimeter hypodense focus in the right hepatic lobe, too   small to characterize and without change.  BILE DUCTS: Normal caliber.  GALLBLADDER: Cholelithiasis.  SPLEEN: Within normal limits.  PANCREAS: Within normal limits.  ADRENALS: Within normal limits.  KIDNEYS/URETERS: Atrophic kidneys. Bilateral renal cysts as well as a 3.1   cm high density lesion exophytic to the left kidney upper pole also   likely a cyst.    BLADDER: Within normal limits.  REPRODUCTIVE ORGANS: Enlarged prostate    BOWEL: No bowel obstruction. Appendix is normal.  PERITONEUM: No ascites.  VESSELS: Within normal limits.  RETROPERITONEUM/LYMPH NODES: No lymphadenopathy.  ABDOMINAL WALL: Fat-containing left inguinal hernia.  BONES: Degenerative changes.    IMPRESSION:  Significant interval decrease in size of a pericardial effusion status   post drain placement.    No acute intra-abdominal pathology.        --- End of Report ---            STARR RODARTE MD; Attending Radiologist  This document has been electronically signed. Nov 26 2023  4:53PM (11-26-23 @ 16:19)  CT Chest No Cont:   ACC: [id number] EXAM:  CT CHEST   ORDERED BY: SHARRI DUONG     PROCEDURE DATE:  11/16/2023          INTERPRETATION:  CLINICAL INFORMATION: Shortness breath. Posterior   pericardial effusion. History of CAD status post stents 6/22/2023, ESRD   on dialysis, CHF, pericardial effusion, COPD.    COMPARISON: None.    CONTRAST/COMPLICATIONS:  IV Contrast: NONE  Oral Contrast: NONE  Complications: None reported at time of study completion    PROCEDURE:  CT scan of the chest was obtained without intravenous contrast.    FINDINGS:    LYMPH NODES: No lymphadenopathy.    HEART/VASCULATURE: The heart is normal in size. Moderate to large   pericardial effusion. There is no flattening of the free wall the right   ventricle.    Atherosclerotic calcification of the aorta and coronary arteries. Status   post coronary stents. Mitral annular calcifications. Also aortic arch and   left-sided descending thoracic aorta. The aorta is tortuous.    AIRWAYS/LUNGS/PLEURA: Trace debris in the central airways. Mild   centrilobular emphysema. Multiple scattered sub-6 mm pulmonary nodules.   For reference: Right apical 0.4 cm nodule (3-23) and 0.4 cm nodule   (3-27), left lower lobe 0.3 cm nodule (3-67).    No pleural effusion. Calcified pleural plaque along the periphery of the   left thoracic cavity, which may be seen in setting of asbestosis exposure.    UPPER ABDOMEN: Bilateral renal cysts. Subcentimeter hyperattenuating   lesion of the left kidney favored to represent a proteinaceous cyst.   Coarse calcifications of the liver. Subcentimeter hypoattenuating lesions   of the liver too small to characterize.    BONES/SOFT TISSUES: Thyroidectomy. Degenerative changes. Sclerotic focus   of the sternum, likely bone island.    IMPRESSION:    1.  Moderateto large pericardial effusion.        --- End of Report ---          ROLAND TREJO MD; Resident Radiologist  This document has been electronically signed.  TATIANA LITTLE MD; Attending Radiologist  This document has been electronically signed. Nov 16 2023  3:25PM (11-16-23 @ 09:25)

## 2023-11-27 NOTE — PROGRESS NOTE ADULT - SUBJECTIVE AND OBJECTIVE BOX
[facility] Division of Kidney Diseases & Hypertension  FOLLOW UP NOTE  [phone]--------------------------------------------------------------------------------  Chief Complaint:ST elevation myocardial infarction (STEMI)        24 hour events/subjective:  Pt says that he is in pain and feels nauseous with the pain medications. No other acute overnight events. No other. complaints. Denies vomiting/ diarrhea/ abdominal pain/ chest pain/ confusion.        PAST HISTORY  --------------------------------------------------------------------------------  No significant changes to PMH, PSH, FHx, SHx, unless otherwise noted    ALLERGIES & MEDICATIONS  --------------------------------------------------------------------------------  Allergies    No Known Allergies    Intolerances    acetaminophen (Other (Mild))    Standing Inpatient Medications  aspirin enteric coated 81 milliGRAM(s) Oral daily  atorvastatin 20 milliGRAM(s) Oral at bedtime  budesonide  80 MICROgram(s)/formoterol 4.5 MICROgram(s) Inhaler 2 Puff(s) Inhalation two times a day  calcium acetate 1334 milliGRAM(s) Oral three times a day with meals  chlorhexidine 2% Cloths 1 Application(s) Topical <User Schedule>  clopidogrel Tablet 75 milliGRAM(s) Oral daily  heparin   Injectable 5000 Unit(s) SubCutaneous every 8 hours  levothyroxine 137 MICROGram(s) Oral daily  metoprolol succinate ER 25 milliGRAM(s) Oral daily  Nephro-lenore 1 Tablet(s) Oral daily  tamsulosin 0.4 milliGRAM(s) Oral at bedtime  tiotropium 2.5 MICROgram(s) Inhaler 2 Puff(s) Inhalation daily    PRN Inpatient Medications  albuterol    90 MICROgram(s) HFA Inhaler 2 Puff(s) Inhalation every 6 hours PRN  bisacodyl 5 milliGRAM(s) Oral every 12 hours PRN  fentaNYL    Injectable 25 MICROGram(s) IV Push every 5 minutes PRN  guaiFENesin Oral Liquid (Sugar-Free) 200 milliGRAM(s) Oral every 6 hours PRN  melatonin 3 milliGRAM(s) Oral at bedtime PRN  midodrine. 10 milliGRAM(s) Oral <User Schedule> PRN  ondansetron Injectable 4 milliGRAM(s) IV Push every 8 hours PRN  oxyCODONE    IR 10 milliGRAM(s) Oral every 6 hours PRN  polyethylene glycol 3350 17 Gram(s) Oral daily PRN      REVIEW OF SYSTEMS  --------------------------------------------------------------------------------  As above.     VITALS/PHYSICAL EXAM  --------------------------------------------------------------------------------  T(C): 36.6 (11-27-23 @ 12:40), Max: 36.9 (11-26-23 @ 21:51)  HR: 84 (11-27-23 @ 12:40) (71 - 84)  BP: 150/86 (11-27-23 @ 12:40) (134/59 - 162/72)  RR: 18 (11-27-23 @ 12:40) (18 - 18)  SpO2: 93% (11-27-23 @ 12:40) (92% - 95%)  Wt(kg): --        11-26-23 @ 07:01  -  11-27-23 @ 07:00  --------------------------------------------------------  IN: 0 mL / OUT: 22 mL / NET: -22 mL    11-27-23 @ 07:01  -  11-27-23 @ 14:36  --------------------------------------------------------  IN: 0 mL / OUT: 0 mL / NET: 0 mL      Physical Exam:  General: no acute distress  Neuro: no focal deficits  HEENT: NC/AT, anicteric  Pulmonary: lungs CTA B/L, Rt chest tube +  Cardiovascular/Chest: +S1S2, RRR  GI/Abdomen: soft, NT/ND, +bowel sounds  Extremities: No edema  Skin: Warm and dry  Vascular access: LUE AVF with palpable thrill    LABS/STUDIES  --------------------------------------------------------------------------------              10.8   6.42  >-----------<  238      [11-27-23 @ 05:38]              33.3     141  |  94  |  60  ----------------------------<  89      [11-27-23 @ 05:37]  4.6   |  27  |  9.89        Ca     8.7     [11-27-23 @ 05:37]      Mg     2.6     [11-27-23 @ 05:37]      Phos  7.3     [11-27-23 @ 05:37]            Creatinine Trend:  SCr 9.89 [11-27 @ 05:37]  SCr 7.56 [11-26 @ 06:44]  SCr 10.64 [11-25 @ 06:14]  SCr 8.94 [11-24 @ 07:33]  SCr 6.11 [11-23 @ 07:12]    Urinalysis - [11-27-23 @ 05:37]      Color  / Appearance  / SG  / pH       Gluc 89 / Ketone   / Bili  / Urobili        Blood  / Protein  / Leuk Est  / Nitrite       RBC  / WBC  / Hyaline  / Gran  / Sq Epi  / Non Sq Epi  / Bacteria         HBsAg Nonreact      [11-22-23 @ 10:05]

## 2023-11-27 NOTE — PROGRESS NOTE ADULT - PROBLEM SELECTOR PLAN 4
Chest pain free. s/p diagnostic cath on 11/16. Has  85 % stenosis in distal Lcx. 40 % stenosis in LAD, unchanged from prior.   - c/w aspirin, plavix, statin. Palvix resumed 11/26  - cardiology is onboard, appreciate recs

## 2023-11-27 NOTE — PHYSICAL THERAPY INITIAL EVALUATION ADULT - PERTINENT HX OF CURRENT PROBLEM, REHAB EVAL
87 y.o. Male PMH HTN, HLD, CAD s/p diagnostic LHC 06/2023 showing severe mLAD disease with severe OM not amendable to stenting, prior LAD + proximal OM stent, PCI/SKYLAR of mLAD x1 (Dr. Wesley) - 08/2023, PVD, LVEF 65-70%, ESRD - on HD diagnosed 6-years ago MWF schedule via AVF left elbow w/ last session 11/20/2023 at [facility] and follows with Dr. Sandra Monte, and anemia presenting to the hospital for pericardial drain. Procedure cancelled 2/2 hyperkalemia. States tolerated full 3 hr session of HD yesterday. Of note pt was recently admitted for evaluation of worsening CAD w/ CP, SOB, exertional dyspnea. He underwent a LHC and was found to non obstructive CAD. Additionally he was found to have a predominantly posterior pericardial effusion, confirmed on TTE. After discussion between cardiology and IR, the patient is planned for a pericardial window outpatient Tuesday, 11/21 after holding Plavix for 5-days via interventional radiology. Initially postponed 2/2 elevated potassium. Now s/p pericardial drain on 11/24/23.

## 2023-11-27 NOTE — PROGRESS NOTE ADULT - SUBJECTIVE AND OBJECTIVE BOX
Interventional Radiology Follow-Up Note    This is a 87 year old man with HTN, hypothyroidism, and ESRD on HD via AVF.  Also, hx. of CAD s/p PCI to LAD 8/2023 and with OM lesion not amenable to PCI.  Presented with angina like symptoms and was found to have large pericardial effusion. Coronary angiography showed no new obstructive CAD. S/P pericardial drain placement by IR on 11/24/23 by MD Haynes, straw colored clear fluid, 800cc initially removed.    S: Patient seen and examined @ bedside. No complaints offered.     Medication:   aspirin enteric coated: (11-26)  clopidogrel Tablet: (11-26)  metoprolol succinate ER: (11-27)    Vitals:   T(F): 97.8, Max: 98.4 (21:51)  HR: 84  BP: 150/86  RR: 18  SpO2: 93%    Physical Exam:  General: Nontoxic, in NAD, A&O x3.  Drain Device: Drain intact attached to pleuravac, waterseal. Dressing clean, dry, intact.    24hr Drain output: 22ml    LABS:  WBC 6.42 / Hgb 10.8 / Hct 33.3 / Plt 238  Na -- / K -- / CO2 -- / Cl -- / BUN -- / Cr -- / Glucose --  ALT -- / AST -- / Alk Phos -- / Tbili --  Ptt -- / Pt -- / INR --    Preliminary Report:    No growth                            10.8   6.42  )-----------( 238      ( 27 Nov 2023 05:38 )             33.3     11-27    141  |  94<L>  |  60<H>  ----------------------------<  89  4.6   |  27  |  9.89<H>    Ca    8.7      27 Nov 2023 05:37  Phos  7.3     11-27  Mg     2.6     11-27              Assessment/Plan:  87y Male admitted with ST elevation myocardial infarction (STEMI)    Pt most recently s/p pericardial drain on 11/24 by MD Haynes.     -continue global management per primary team  -monitor h/h; transfuse as needed  -trend vs/labs  -once pericardial drain < 10ml/in 24 hrs and repeat echo with resolved/minimal effusion and stable hemodynamics will then plan to remove. IR will continue to follow.   -change dressing q3 days or when dressing is saturated       Please call IR at extension [phone] with any questions, concerns, or issues regarding above.

## 2023-11-27 NOTE — PROGRESS NOTE ADULT - ASSESSMENT
87 year old man with HTN, hypothyroidism, ESRD on HD via AVF, CAD s/p PCI to LAD 8/2023 and OM lesion not amenable to PCI who presented with angina like symptoms and was found to have large pericardial effusion. Coronary angiography showed no new obstructive CAD.  S/p pericardial drain placement by IR on 11/24/23, straw colored clear fluid, 800cc initially removed    Recommendations:  - c/w ASA, plavix, atorva 20  - continue to observe output from drain, follow up pericardial fluid studies  - f/u IR recommendations regarding removal (typically < 50cc in 24h). Still significant output overnight  - limited TTE following removal to monitor effusion  - c/w Toprol 25mg  - c/w midodrine on dialysis days    Darin Haney MD  PGY-4, Cardiology Fellow    Please check [email or web address]: [email or web address]" for cardiology service schedule and contact information.   *Please note that all recommendations are incomplete until attending attestation* 87 year old man with HTN, hypothyroidism, and ESRD on HD via AVF.  Also, hx. of CAD s/p PCI to LAD 8/2023 and with OM lesion not amenable to PCI.  Presented with angina like symptoms and was found to have large pericardial effusion. Coronary angiography showed no new obstructive CAD.  S/P pericardial drain placement by IR on 11/24/23, straw colored clear fluid, 800cc initially removed      Recommendations:  - c/w ASA, Plavix, atorvastatin 20  - continue to observe output from drain, follow up pericardial fluid studies  - f/u IR recommendations regarding removal (typically < 50cc in 24h). Still significant output overnight  - limited TTE following removal to monitor effusion  - c/w Toprol XL 25mg qd  - c/w midodrine on dialysis days      Darin Haney MD  PGY-4, Cardiology Fellow    Plan discussed with cardiology fellow.  Patient seen and examined.  Hx., exam and labs as above.  I agree with the assessment and recommendations, which I have reviewed and edited where appropriate.  Tyrell Cuevas M.D.  Cardiology Attending, Consult Service    For Cardiology consults and questions, all Cardiology service information can be found 24/7 on [email or web address] - use password: [email or web address] to log in.  87 year old man with HTN, hypothyroidism, and ESRD on HD via AVF.  Also, hx. of CAD s/p PCI to LAD 8/2023 and with OM lesion not amenable to PCI.    Presented with angina like symptoms and was found to have large pericardial effusion. Coronary angiography showed no new obstructive CAD.  S/P pericardial drain placement by IR on 11/24/23, straw colored clear fluid, 800cc initially removed      Recommendations:  - c/w ASA, Plavix, atorvastatin 20 mg. qd  - continue to observe output from drain, follow up pericardial fluid studies  - f/u IR recommendations regarding removal (typically < 50cc in 24h). Still significant output overnight  - limited TTE following removal to monitor effusion  - c/w Toprol XL 25mg qd  - c/w midodrine on dialysis days      Darin Haney MD  PGY-4, Cardiology Fellow    Plan discussed with cardiology fellow.  Patient seen and examined.  Hx., exam and labs as above.  I agree with the assessment and recommendations, which I have reviewed and edited where appropriate.  Tyrell Cuevas M.D.  Cardiology Attending, Consult Service    For Cardiology consults and questions, all Cardiology service information can be found 24/7 on [email or web address] - use password: [email or web address] to log in.     Plan discussed with cardiology fellow.  Patient seen and examined.  Hx., exam and labs as above.  I agree with the assessment and recommendations, which I have reviewed and edited where appropriate.  Tyrell Cuevas M.D.  Cardiology Attending, Consult Service    For Cardiology consults and questions, all Cardiology service information can be found 24/7 on [email or web address] - use password: [employer] to log in.

## 2023-11-27 NOTE — PROGRESS NOTE ADULT - ASSESSMENT
87 year old man with HTN, hypothyroidism, ESRD on HD via AVF, CAD s/p PCI to LAD 8/2023 and OM lesion not amenable to PCI who presented with angina like symptoms and was found to have large pericardial effusion. Coronary angiography showed no new obstructive CAD.  S/p pericardial drain placement by IR on 11/24/23, straw colored clear fluid, 800cc initially removed.

## 2023-11-27 NOTE — PROGRESS NOTE ADULT - PROBLEM SELECTOR PLAN 2
- continue zofran 4mg q8hr PRN for nausea  - obtain CT chest, abdomen and pelvis showed no obstruction

## 2023-11-27 NOTE — PROGRESS NOTE ADULT - PROBLEM SELECTOR PLAN 1
New posterior pericardial effusion seen while getting C (11/16). Unknown etiology as of yet. Pleural fluid LDH significantly elevated, concerning for exudative fluid  - Hemodynamically stable  - restarted plavix after drain, on aspirin  - s/p pericardial drain placement on 11/24, approximately 800 c of serious fluid was aspirated  - 11/25: [id number] cc of straw-colored fluid from the pericardial drain  - 11/26: about 75 cc in the past 24 hours  - continue to monitor output from pericardial drain  - f/u pericardial fluid studies-- gram stain, LDH, protein, cytology  - limited TTE after drain placement-- small pericardial fluid   - cardiology onboard

## 2023-11-27 NOTE — PHYSICAL THERAPY INITIAL EVALUATION ADULT - ADDITIONAL COMMENTS
Pt resides in an apartment w/ spouse, 3 steps to enter (+HR). PTA pt was independent w/ all functional mobility & ADL's. Did not use an AD for ambulation.

## 2023-11-28 DIAGNOSIS — J96.01 ACUTE RESPIRATORY FAILURE WITH HYPOXIA: ICD-10-CM

## 2023-11-28 DIAGNOSIS — J44.1 CHRONIC OBSTRUCTIVE PULMONARY DISEASE WITH (ACUTE) EXACERBATION: ICD-10-CM

## 2023-11-28 LAB
APTT BLD: 29.2 SEC — SIGNIFICANT CHANGE UP (ref 24.5–35.6)
APTT BLD: 29.2 SEC — SIGNIFICANT CHANGE UP (ref 24.5–35.6)
CK MB CFR SERPL CALC: 2.8 NG/ML — SIGNIFICANT CHANGE UP (ref 0–6.7)
CK MB CFR SERPL CALC: 2.8 NG/ML — SIGNIFICANT CHANGE UP (ref 0–6.7)
CK MB CFR SERPL CALC: 3.3 NG/ML — SIGNIFICANT CHANGE UP (ref 0–6.7)
CK MB CFR SERPL CALC: 3.3 NG/ML — SIGNIFICANT CHANGE UP (ref 0–6.7)
CK SERPL-CCNC: 72 U/L — SIGNIFICANT CHANGE UP (ref 30–200)
CK SERPL-CCNC: 72 U/L — SIGNIFICANT CHANGE UP (ref 30–200)
CK SERPL-CCNC: 88 U/L — SIGNIFICANT CHANGE UP (ref 30–200)
CK SERPL-CCNC: 88 U/L — SIGNIFICANT CHANGE UP (ref 30–200)
HCT VFR BLD CALC: 28.2 % — LOW (ref 39–50)
HCT VFR BLD CALC: 28.2 % — LOW (ref 39–50)
HCT VFR BLD CALC: 34.8 % — LOW (ref 39–50)
HCT VFR BLD CALC: 34.8 % — LOW (ref 39–50)
HGB BLD-MCNC: 11.2 G/DL — LOW (ref 13–17)
HGB BLD-MCNC: 11.2 G/DL — LOW (ref 13–17)
HGB BLD-MCNC: 9.1 G/DL — LOW (ref 13–17)
HGB BLD-MCNC: 9.1 G/DL — LOW (ref 13–17)
INR BLD: 1.96 RATIO — HIGH (ref 0.85–1.18)
INR BLD: 1.96 RATIO — HIGH (ref 0.85–1.18)
MCHC RBC-ENTMCNC: 31.3 PG — SIGNIFICANT CHANGE UP (ref 27–34)
MCHC RBC-ENTMCNC: 32.2 GM/DL — SIGNIFICANT CHANGE UP (ref 32–36)
MCHC RBC-ENTMCNC: 32.2 GM/DL — SIGNIFICANT CHANGE UP (ref 32–36)
MCHC RBC-ENTMCNC: 32.3 GM/DL — SIGNIFICANT CHANGE UP (ref 32–36)
MCHC RBC-ENTMCNC: 32.3 GM/DL — SIGNIFICANT CHANGE UP (ref 32–36)
MCV RBC AUTO: 96.9 FL — SIGNIFICANT CHANGE UP (ref 80–100)
MCV RBC AUTO: 96.9 FL — SIGNIFICANT CHANGE UP (ref 80–100)
MCV RBC AUTO: 97.2 FL — SIGNIFICANT CHANGE UP (ref 80–100)
MCV RBC AUTO: 97.2 FL — SIGNIFICANT CHANGE UP (ref 80–100)
MRSA PCR RESULT.: SIGNIFICANT CHANGE UP
MRSA PCR RESULT.: SIGNIFICANT CHANGE UP
NRBC # BLD: 0 /100 WBCS — SIGNIFICANT CHANGE UP (ref 0–0)
PLATELET # BLD AUTO: 235 K/UL — SIGNIFICANT CHANGE UP (ref 150–400)
PLATELET # BLD AUTO: 235 K/UL — SIGNIFICANT CHANGE UP (ref 150–400)
PLATELET # BLD AUTO: 283 K/UL — SIGNIFICANT CHANGE UP (ref 150–400)
PLATELET # BLD AUTO: 283 K/UL — SIGNIFICANT CHANGE UP (ref 150–400)
PROTHROM AB SERPL-ACNC: 20.2 SEC — HIGH (ref 9.5–13)
PROTHROM AB SERPL-ACNC: 20.2 SEC — HIGH (ref 9.5–13)
RBC # BLD: 2.91 M/UL — LOW (ref 4.2–5.8)
RBC # BLD: 2.91 M/UL — LOW (ref 4.2–5.8)
RBC # BLD: 3.58 M/UL — LOW (ref 4.2–5.8)
RBC # BLD: 3.58 M/UL — LOW (ref 4.2–5.8)
RBC # FLD: 14.1 % — SIGNIFICANT CHANGE UP (ref 10.3–14.5)
S AUREUS DNA NOSE QL NAA+PROBE: SIGNIFICANT CHANGE UP
S AUREUS DNA NOSE QL NAA+PROBE: SIGNIFICANT CHANGE UP
TROPONIN T, HIGH SENSITIVITY RESULT: 108 NG/L — HIGH (ref 0–51)
TROPONIN T, HIGH SENSITIVITY RESULT: 108 NG/L — HIGH (ref 0–51)
TROPONIN T, HIGH SENSITIVITY RESULT: 91 NG/L — HIGH (ref 0–51)
TROPONIN T, HIGH SENSITIVITY RESULT: 91 NG/L — HIGH (ref 0–51)
WBC # BLD: 6.44 K/UL — SIGNIFICANT CHANGE UP (ref 3.8–10.5)
WBC # BLD: 6.44 K/UL — SIGNIFICANT CHANGE UP (ref 3.8–10.5)
WBC # BLD: 6.45 K/UL — SIGNIFICANT CHANGE UP (ref 3.8–10.5)
WBC # BLD: 6.45 K/UL — SIGNIFICANT CHANGE UP (ref 3.8–10.5)
WBC # FLD AUTO: 6.44 K/UL — SIGNIFICANT CHANGE UP (ref 3.8–10.5)
WBC # FLD AUTO: 6.44 K/UL — SIGNIFICANT CHANGE UP (ref 3.8–10.5)
WBC # FLD AUTO: 6.45 K/UL — SIGNIFICANT CHANGE UP (ref 3.8–10.5)
WBC # FLD AUTO: 6.45 K/UL — SIGNIFICANT CHANGE UP (ref 3.8–10.5)

## 2023-11-28 PROCEDURE — 93321 DOPPLER ECHO F-UP/LMTD STD: CPT | Mod: 26

## 2023-11-28 PROCEDURE — 93308 TTE F-UP OR LMTD: CPT | Mod: 26

## 2023-11-28 PROCEDURE — 71045 X-RAY EXAM CHEST 1 VIEW: CPT | Mod: 26

## 2023-11-28 PROCEDURE — 93010 ELECTROCARDIOGRAM REPORT: CPT

## 2023-11-28 PROCEDURE — 99233 SBSQ HOSP IP/OBS HIGH 50: CPT

## 2023-11-28 PROCEDURE — 99231 SBSQ HOSP IP/OBS SF/LOW 25: CPT

## 2023-11-28 PROCEDURE — 99233 SBSQ HOSP IP/OBS HIGH 50: CPT | Mod: GC

## 2023-11-28 RX ORDER — LEVOTHYROXINE SODIUM 125 MCG
105 TABLET ORAL AT BEDTIME
Refills: 0 | Status: DISCONTINUED | OUTPATIENT
Start: 2023-11-28 | End: 2023-12-01

## 2023-11-28 RX ORDER — CEFTRIAXONE 500 MG/1
1000 INJECTION, POWDER, FOR SOLUTION INTRAMUSCULAR; INTRAVENOUS EVERY 24 HOURS
Refills: 0 | Status: DISCONTINUED | OUTPATIENT
Start: 2023-11-29 | End: 2023-12-01

## 2023-11-28 RX ORDER — PANTOPRAZOLE SODIUM 20 MG/1
40 TABLET, DELAYED RELEASE ORAL
Refills: 0 | Status: DISCONTINUED | OUTPATIENT
Start: 2023-11-28 | End: 2023-12-01

## 2023-11-28 RX ORDER — METOPROLOL TARTRATE 50 MG
5 TABLET ORAL ONCE
Refills: 0 | Status: COMPLETED | OUTPATIENT
Start: 2023-11-28 | End: 2023-11-28

## 2023-11-28 RX ORDER — SODIUM CHLORIDE 9 MG/ML
500 INJECTION INTRAMUSCULAR; INTRAVENOUS; SUBCUTANEOUS ONCE
Refills: 0 | Status: DISCONTINUED | OUTPATIENT
Start: 2023-11-28 | End: 2023-11-28

## 2023-11-28 RX ORDER — PANTOPRAZOLE SODIUM 20 MG/1
40 TABLET, DELAYED RELEASE ORAL
Refills: 0 | Status: DISCONTINUED | OUTPATIENT
Start: 2023-11-28 | End: 2023-11-28

## 2023-11-28 RX ORDER — IPRATROPIUM/ALBUTEROL SULFATE 18-103MCG
3 AEROSOL WITH ADAPTER (GRAM) INHALATION EVERY 6 HOURS
Refills: 0 | Status: DISCONTINUED | OUTPATIENT
Start: 2023-11-28 | End: 2023-12-01

## 2023-11-28 RX ORDER — CEFTRIAXONE 500 MG/1
1000 INJECTION, POWDER, FOR SOLUTION INTRAMUSCULAR; INTRAVENOUS ONCE
Refills: 0 | Status: COMPLETED | OUTPATIENT
Start: 2023-11-28 | End: 2023-11-28

## 2023-11-28 RX ORDER — SODIUM CHLORIDE 9 MG/ML
4 INJECTION INTRAMUSCULAR; INTRAVENOUS; SUBCUTANEOUS EVERY 12 HOURS
Refills: 0 | Status: COMPLETED | OUTPATIENT
Start: 2023-11-28 | End: 2023-12-01

## 2023-11-28 RX ORDER — ACETAMINOPHEN 500 MG
1000 TABLET ORAL ONCE
Refills: 0 | Status: COMPLETED | OUTPATIENT
Start: 2023-11-28 | End: 2023-11-28

## 2023-11-28 RX ORDER — AZITHROMYCIN 500 MG/1
500 TABLET, FILM COATED ORAL ONCE
Refills: 0 | Status: COMPLETED | OUTPATIENT
Start: 2023-11-28 | End: 2023-11-28

## 2023-11-28 RX ORDER — SODIUM CHLORIDE 9 MG/ML
250 INJECTION INTRAMUSCULAR; INTRAVENOUS; SUBCUTANEOUS ONCE
Refills: 0 | Status: COMPLETED | OUTPATIENT
Start: 2023-11-28 | End: 2023-11-28

## 2023-11-28 RX ORDER — CEFTRIAXONE 500 MG/1
INJECTION, POWDER, FOR SOLUTION INTRAMUSCULAR; INTRAVENOUS
Refills: 0 | Status: DISCONTINUED | OUTPATIENT
Start: 2023-11-28 | End: 2023-12-01

## 2023-11-28 RX ORDER — AZITHROMYCIN 500 MG/1
250 TABLET, FILM COATED ORAL EVERY 24 HOURS
Refills: 0 | Status: DISCONTINUED | OUTPATIENT
Start: 2023-11-29 | End: 2023-12-01

## 2023-11-28 RX ORDER — METOPROLOL TARTRATE 50 MG
2.5 TABLET ORAL EVERY 6 HOURS
Refills: 0 | Status: DISCONTINUED | OUTPATIENT
Start: 2023-11-28 | End: 2023-11-30

## 2023-11-28 RX ORDER — METOPROLOL TARTRATE 50 MG
5 TABLET ORAL ONCE
Refills: 0 | Status: DISCONTINUED | OUTPATIENT
Start: 2023-11-28 | End: 2023-11-30

## 2023-11-28 RX ORDER — AZITHROMYCIN 500 MG/1
TABLET, FILM COATED ORAL
Refills: 0 | Status: DISCONTINUED | OUTPATIENT
Start: 2023-11-28 | End: 2023-12-01

## 2023-11-28 RX ADMIN — Medication 40 MILLIGRAM(S): at 16:35

## 2023-11-28 RX ADMIN — Medication 5 MILLIGRAM(S): at 16:42

## 2023-11-28 RX ADMIN — SODIUM CHLORIDE 125 MILLILITER(S): 9 INJECTION INTRAMUSCULAR; INTRAVENOUS; SUBCUTANEOUS at 17:51

## 2023-11-28 RX ADMIN — ONDANSETRON 4 MILLIGRAM(S): 8 TABLET, FILM COATED ORAL at 08:33

## 2023-11-28 RX ADMIN — Medication 81 MILLIGRAM(S): at 12:48

## 2023-11-28 RX ADMIN — Medication 1200 MILLIGRAM(S): at 12:47

## 2023-11-28 RX ADMIN — Medication 1000 MILLIGRAM(S): at 17:14

## 2023-11-28 RX ADMIN — PANTOPRAZOLE SODIUM 40 MILLIGRAM(S): 20 TABLET, DELAYED RELEASE ORAL at 12:47

## 2023-11-28 RX ADMIN — OXYCODONE HYDROCHLORIDE 10 MILLIGRAM(S): 5 TABLET ORAL at 09:30

## 2023-11-28 RX ADMIN — Medication 200 MILLIGRAM(S): at 16:35

## 2023-11-28 RX ADMIN — PANTOPRAZOLE SODIUM 40 MILLIGRAM(S): 20 TABLET, DELAYED RELEASE ORAL at 17:04

## 2023-11-28 RX ADMIN — OXYCODONE HYDROCHLORIDE 10 MILLIGRAM(S): 5 TABLET ORAL at 08:34

## 2023-11-28 RX ADMIN — HEPARIN SODIUM 5000 UNIT(S): 5000 INJECTION INTRAVENOUS; SUBCUTANEOUS at 12:47

## 2023-11-28 RX ADMIN — Medication 400 MILLIGRAM(S): at 17:04

## 2023-11-28 RX ADMIN — AZITHROMYCIN 255 MILLIGRAM(S): 500 TABLET, FILM COATED ORAL at 16:35

## 2023-11-28 RX ADMIN — Medication 1 TABLET(S): at 12:48

## 2023-11-28 RX ADMIN — CLOPIDOGREL BISULFATE 75 MILLIGRAM(S): 75 TABLET, FILM COATED ORAL at 12:47

## 2023-11-28 RX ADMIN — Medication 1334 MILLIGRAM(S): at 12:48

## 2023-11-28 RX ADMIN — CEFTRIAXONE 100 MILLIGRAM(S): 500 INJECTION, POWDER, FOR SOLUTION INTRAMUSCULAR; INTRAVENOUS at 16:35

## 2023-11-28 RX ADMIN — Medication 2.5 MILLIGRAM(S): at 17:50

## 2023-11-28 RX ADMIN — Medication 105 MICROGRAM(S): at 21:50

## 2023-11-28 RX ADMIN — BUDESONIDE AND FORMOTEROL FUMARATE DIHYDRATE 2 PUFF(S): 160; 4.5 AEROSOL RESPIRATORY (INHALATION) at 17:08

## 2023-11-28 RX ADMIN — CHLORHEXIDINE GLUCONATE 1 APPLICATION(S): 213 SOLUTION TOPICAL at 05:13

## 2023-11-28 NOTE — CHART NOTE - NSCHARTNOTEFT_GEN_A_CORE
Medicine NP Note     SERENITY MAYER  MRN-[id number]  Allergies    No Known Allergies    Intolerances    acetaminophen (Other (Mild))     Called to evaluate patient for Afib RVR sustaining in 150s. Pt states he is very upset. Has been having some indigestion and continues to cough/gag up mucus. RN states no vomit, clear mucus spitting noted. Pt states he feels pain in chest. Unable to rate or describe pain. Pt refused beta blocker this am due to nausea. Recent Pericardial drain removal today. Denies abdominal pain, no stool in 5 days, denies dizziness, diaphoresis.     Vital Signs Last 24 Hrs  T(C): 36.5 (11-28-23 @ 16:00), Max: 37 (11-27-23 @ 20:03)  T(F): 97.7 (11-28-23 @ 16:00), Max: 98.6 (11-27-23 @ 20:03)  HR: 149 (11-28-23 @ 16:16) (61 - 158)  BP: 149/78 (11-28-23 @ 16:00) (132/66 - 168/75)  BP(mean): 115 (11-27-23 @ 19:09) (115 - 115)  RR: 19 (11-28-23 @ 16:00) (18 - 19)  SpO2: 94% (11-28-23 @ 16:00) (93% - 94%)  Daily     Daily   I&O's Summary    27 Nov 2023 07:01  -  28 Nov 2023 07:00  --------------------------------------------------------  IN: 800 mL / OUT: 800 mL / NET: 0 mL                          10.8   6.42  )-----------( 238      ( 27 Nov 2023 05:38 )             33.3     11-27    141  |  94<L>  |  60<H>  ----------------------------<  89  4.6   |  27  |  9.89<H>    Ca    8.7      27 Nov 2023 05:37  Phos  7.3     11-27  Mg     2.6     11-27        Radiology:< from: CT Abdomen and Pelvis w/ Oral Cont (11.26.23 @ 16:19) >  Significant interval decrease in size of a pericardial effusion status   post drain placement. No acute intra-abdominal pathology.    < end of copied text >    PHYSICAL EXAM:  GENERAL: NAD, well-developed  HEAD:  Atraumatic, Normocephalic  EYES: EOMI, PERRLA, conjunctiva and sclera clear  NECK: Supple, No JVD  CHEST/LUNG: Clear to auscultation bilaterally; No wheeze  HEART: Regular rate and rhythm; No murmurs, rubs, or gallops  ABDOMEN: Soft, Nontender, Nondistended; Bowel sounds present  EXTREMITIES:  2+ Peripheral Pulses, No clubbing, cyanosis, or edema  PSYCH: AAOx3  NEUROLOGY: non-focal  SKIN: No rashes or lesions    Assessment/Plan: HPI:  88yo Male with PMHx of HTN, HLD, CAD s/p diagnostic LHC 06/2023 showing severe mLAD disease with severe OM not amendable to stenting, prior LAD + proximal OM stent, PCI/SKYLAR of mLAD x1 (Dr. Wesley) - 08/2023, PVD, LVEF 65-70%, ESRD - on HD diagnosed 6-years ago MWF schedule via AVF left elbow w/ last session 11/20/2023 at [facility] and follows with Dr. Sandra Monte, and anemia presenting to the hospital for pericardial drain. Procedure cancelled 2/2 hyperkalemia. States tolerated full 3 hr session of HD yesterday. Of note pt was recently admitted for evaluation of worsening CAD w/ CP, SOB, exertional dyspnea. He underwent a LHC and was found to non obstructive CAD. Additionally he was found to have a predominantly posterior pericardial effusion, confirmed on TTE. After discussion between cardiology and IR, the patient is planned for a pericardial window outpatient Tuesday, 11/21 after holding Plavix for 5-days via interventional radiology    (21 Nov 2023 13:19) Medicine NP Note     SERENITY MAYER  MRN-[id number]  Allergies    No Known Allergies    Intolerances    acetaminophen (Other (Mild))     Called to evaluate patient for Afib RVR sustaining in 150s. Pt states he is very upset. Has been having some indigestion and continues to cough/gag up mucus. Noted large brown watery emesis at bedside. Pt admits to chest pain and throat pain. Unable to rate or describe pain. Pt refused beta blocker this am due to nausea. Recent Pericardial drain removal today. Denies abdominal pain, no stool in 5 days, denies dizziness, diaphoresis.     Vital Signs Last 24 Hrs  T(C): 36.5 (11-28-23 @ 16:00), Max: 37 (11-27-23 @ 20:03)  T(F): 97.7 (11-28-23 @ 16:00), Max: 98.6 (11-27-23 @ 20:03)  HR: 149 (11-28-23 @ 16:16) (61 - 158)  BP: 149/78 (11-28-23 @ 16:00) (132/66 - 168/75)  BP(mean): 115 (11-27-23 @ 19:09) (115 - 115)  RR: 19 (11-28-23 @ 16:00) (18 - 19)    27 Nov 2023 07:01  -  28 Nov 2023 07:00  --------------------------------------------------------  IN: 800 mL / OUT: 800 mL / NET: 0 mL                          10.8   6.42  )-----------( 238      ( 27 Nov 2023 05:38 )             33.3     11-27    141  |  94<L>  |  60<H>  ----------------------------<  89  4.6   |  27  |  9.89<H>    Ca    8.7      27 Nov 2023 05:37  Phos  7.3     11-27  Mg     2.6     11-27      Radiology:< from: CT Abdomen and Pelvis w/ Oral Cont (11.26.23 @ 16:19) >  ABDOMEN AND PELVIS:  LIVER: A subcentimeter hypodense focus in the right hepatic lobe, too   small to characterize and without change.  BILE DUCTS: Normal caliber.  GALLBLADDER: Cholelithiasis.  SPLEEN: Within normal limits.  PANCREAS: Within normal limits.  ADRENALS: Within normal limits.  KIDNEYS/URETERS: Atrophic kidneys. Bilateral renal cysts as well as a 3.1   cm high density lesion exophytic to the left kidney upper pole also   likely a cyst.  BLADDER: Within normal limits.  REPRODUCTIVE ORGANS: Enlarged prostate    BOWEL: No bowel obstruction. Appendix is normal.  PERITONEUM: No ascites.  VESSELS: Within normal limits.  RETROPERITONEUM/LYMPH NODES: No lymphadenopathy.  ABDOMINAL WALL: Fat-containing left inguinal hernia.  BONES: Degenerative changes.  Significant interval decrease in size of a pericardial effusion status   post drain placement. No acute intra-abdominal pathology.    < end of copied text >    PHYSICAL EXAM:  GENERAL: Vomited during exam, brown liquid. No foul smell  CHEST/LUNG: Clear to auscultation bilaterally; No wheeze  HEART: Tachycardia, irregular   ABDOMEN: Soft distension, BS present   EXTREMITIES:  2+ Peripheral Pulses, No clubbing, cyanosis, or edema  PSYCH: AAOx3  NEUROLOGY: non-focal  SKIN: No rashes or lesions    Assessment/Plan: HPI:  88yo Male with PMHx of HTN, HLD, CAD s/p diagnostic [facility] 06/2023 showing severe mLAD disease with severe OM not amendable to stenting, prior LAD + proximal OM stent, PCI/SKYLAR of mLAD x1 (Dr. Wesley) - 08/2023, PVD, LVEF 65-70%, ESRD - on HD diagnosed 6-years ago MWF schedule via AVF left elbow w/ last session 11/20/2023 at [facility] [place] and follows with Dr. Sandra Monte, and anemia presenting to the hospital for pericardial drain ( cath 11/16/23 with effusion), procedure postponed due to hyperkalemia, now status post pericardial drain removal today with post procedural CP , New Afib RVR and persistent nausea, vomiting , possible GIB/Gastritis?    1. Afib RVR in the setting of recent pericardial drain removal: Discussed with cardiology fellow. New afib RVR likely driven by the irritation of the drain coming out. Will given lopressor 5mg x 1 now for HR 140s, /79. Start lopressor 2.5mg IV Q 6 as patient refusing oral tablets due to n/v. Noted dark vomit, unclear if GIB. Will hold on heparin for now. Discussed with Dr. Monahan who agrees. Discussed with Melissa from IR, will do stat cxr, echo and labs now. ( echo department called to facilitate test) . Vital signs Q 4 hours.     2. Chest pain- in the setting of Afib RVR. Will check Cardiac enzymes. Pt with recent cardiac cath 11/16/23 Patent LAD stents. Unchanged OM and small diag disease. Will give tylenol IV ( patient daughter states not a true allergy to tylenol, just does not like pain medications)     3. N/V- No abdominal pain or cramping. Patient with episode of dark vomit while assessment being done. Similar last night. No foul smell. Bowel sounds present and patient states he is passing gas. Possible gastritis? CT 11/26 without obstruction. Protonix IV BID , No GI consult at this time. However if symptoms persist, patient will likely need.     4. Couging /mucus production - patient with hx of COPD. Per Dr. Monahan , to start ceftriaxone and azithro for possible exacerbation/bronchitis.     Will follow closely and sign out with detail to proceeding team Medicine NP Note     SERENITY MAYER  MRN-[id number]  Allergies    No Known Allergies    Intolerances    acetaminophen (Other (Mild))     Called to evaluate patient for Afib RVR sustaining in 150s. Pt states he is very upset. Has been having some indigestion and continues to cough/gag up mucus. Noted large brown watery emesis at bedside. Pt admits to chest pain and throat pain. Unable to rate or describe pain. Pt refused beta blocker this am due to nausea. Recent Pericardial drain removal today. Denies abdominal pain, no stool in 5 days, denies dizziness, diaphoresis.     Vital Signs Last 24 Hrs  T(C): 36.5 (11-28-23 @ 16:00), Max: 37 (11-27-23 @ 20:03)  T(F): 97.7 (11-28-23 @ 16:00), Max: 98.6 (11-27-23 @ 20:03)  HR: 149 (11-28-23 @ 16:16) (61 - 158)  BP: 149/78 (11-28-23 @ 16:00) (132/66 - 168/75)  BP(mean): 115 (11-27-23 @ 19:09) (115 - 115)  RR: 19 (11-28-23 @ 16:00) (18 - 19)    27 Nov 2023 07:01  -  28 Nov 2023 07:00  --------------------------------------------------------  IN: 800 mL / OUT: 800 mL / NET: 0 mL                          10.8   6.42  )-----------( 238      ( 27 Nov 2023 05:38 )             33.3     11-27    141  |  94<L>  |  60<H>  ----------------------------<  89  4.6   |  27  |  9.89<H>    Ca    8.7      27 Nov 2023 05:37  Phos  7.3     11-27  Mg     2.6     11-27      Radiology:< from: CT Abdomen and Pelvis w/ Oral Cont (11.26.23 @ 16:19) >  ABDOMEN AND PELVIS:  LIVER: A subcentimeter hypodense focus in the right hepatic lobe, too   small to characterize and without change.  BILE DUCTS: Normal caliber.  GALLBLADDER: Cholelithiasis.  SPLEEN: Within normal limits.  PANCREAS: Within normal limits.  ADRENALS: Within normal limits.  KIDNEYS/URETERS: Atrophic kidneys. Bilateral renal cysts as well as a 3.1   cm high density lesion exophytic to the left kidney upper pole also   likely a cyst.  BLADDER: Within normal limits.  REPRODUCTIVE ORGANS: Enlarged prostate    BOWEL: No bowel obstruction. Appendix is normal.  PERITONEUM: No ascites.  VESSELS: Within normal limits.  RETROPERITONEUM/LYMPH NODES: No lymphadenopathy.  ABDOMINAL WALL: Fat-containing left inguinal hernia.  BONES: Degenerative changes.  Significant interval decrease in size of a pericardial effusion status   post drain placement. No acute intra-abdominal pathology.    < end of copied text >    PHYSICAL EXAM:  GENERAL: Vomited during exam, brown liquid. No foul smell  CHEST/LUNG: Clear to auscultation bilaterally; No wheeze  HEART: Tachycardia, irregular   ABDOMEN: Soft distension, BS present   EXTREMITIES:  2+ Peripheral Pulses, No clubbing, cyanosis, or edema  PSYCH: AAOx3  NEUROLOGY: non-focal  SKIN: No rashes or lesions    Assessment/Plan: HPI:  88yo Male with PMHx of HTN, HLD, CAD s/p diagnostic [facility] 06/2023 showing severe mLAD disease with severe OM not amendable to stenting, prior LAD + proximal OM stent, PCI/SKYLAR of mLAD x1 (Dr. Wesley) - 08/2023, PVD, LVEF 65-70%, ESRD - on HD diagnosed 6-years ago MWF schedule via AVF left elbow w/ last session 11/20/2023 at [facility] [place] and follows with Dr. Sandra Monte, and anemia presenting to the hospital for pericardial drain ( cath 11/16/23 with effusion), procedure postponed due to hyperkalemia, now status post pericardial drain removal today with post procedural CP , New Afib RVR and persistent nausea, vomiting , possible GIB/Gastritis?    1. Afib RVR in the setting of recent pericardial drain removal: Discussed with cardiology fellow. New afib RVR likely driven by the irritation of the drain coming out. Will given lopressor 5mg x 1 now for HR 140s, /79. Start lopressor 2.5mg IV Q 6 as patient refusing oral tablets due to n/v. Noted dark vomit, unclear if GIB. Will hold on heparin for now. Discussed with Dr. Monahan who agrees. Discussed with Melissa from IR, will do stat cxr, echo and labs now. ( echo department called to facilitate test) . Vital signs Q 4 hours.     2. Chest pain- in the setting of Afib RVR. Will check Cardiac enzymes. Pt with recent cardiac cath 11/16/23 Patent LAD stents. Unchanged OM and small diag disease. Will give tylenol IV ( patient daughter states not a true allergy to tylenol, just does not like pain medications)     3. N/V- No abdominal pain or cramping. Patient with episode of dark vomit while assessment being done. Similar last night. No foul smell. Bowel sounds present and patient states he is passing gas. Possible gastritis? CT 11/26 without obstruction. Protonix IV BID , No GI consult at this time. However if symptoms persist, patient will likely need. IVF NPO for now     4. Couging /mucus production - patient with hx of COPD. Per Dr. Monahan , to start ceftriaxone and azithro for possible exacerbation/bronchitis.     Will follow closely and sign out with detail to proceeding team Medicine NP Note     SERENITY MAYER  MRN-[id number]  Allergies    No Known Allergies    Intolerances    acetaminophen (Other (Mild))     Called to evaluate patient for Afib RVR sustaining in 150s. Pt states he is very upset. Has been having some indigestion and continues to cough/gag up mucus. Noted large brown watery emesis at bedside. Pt admits to chest pain and throat pain. Unable to rate or describe pain. Pt refused beta blocker this am due to nausea. Recent Pericardial drain removal today. Denies abdominal pain, no stool in 5 days, denies dizziness, diaphoresis.     Vital Signs Last 24 Hrs  T(C): 36.5 (11-28-23 @ 16:00), Max: 37 (11-27-23 @ 20:03)  T(F): 97.7 (11-28-23 @ 16:00), Max: 98.6 (11-27-23 @ 20:03)  HR: 149 (11-28-23 @ 16:16) (61 - 158)  BP: 149/78 (11-28-23 @ 16:00) (132/66 - 168/75)  BP(mean): 115 (11-27-23 @ 19:09) (115 - 115)  RR: 19 (11-28-23 @ 16:00) (18 - 19)    27 Nov 2023 07:01  -  28 Nov 2023 07:00  --------------------------------------------------------  IN: 800 mL / OUT: 800 mL / NET: 0 mL                          10.8   6.42  )-----------( 238      ( 27 Nov 2023 05:38 )             33.3     11-27    141  |  94<L>  |  60<H>  ----------------------------<  89  4.6   |  27  |  9.89<H>    Ca    8.7      27 Nov 2023 05:37  Phos  7.3     11-27  Mg     2.6     11-27      Radiology:< from: CT Abdomen and Pelvis w/ Oral Cont (11.26.23 @ 16:19) >  ABDOMEN AND PELVIS:  LIVER: A subcentimeter hypodense focus in the right hepatic lobe, too   small to characterize and without change.  BILE DUCTS: Normal caliber.  GALLBLADDER: Cholelithiasis.  SPLEEN: Within normal limits.  PANCREAS: Within normal limits.  ADRENALS: Within normal limits.  KIDNEYS/URETERS: Atrophic kidneys. Bilateral renal cysts as well as a 3.1   cm high density lesion exophytic to the left kidney upper pole also   likely a cyst.  BLADDER: Within normal limits.  REPRODUCTIVE ORGANS: Enlarged prostate    BOWEL: No bowel obstruction. Appendix is normal.  PERITONEUM: No ascites.  VESSELS: Within normal limits.  RETROPERITONEUM/LYMPH NODES: No lymphadenopathy.  ABDOMINAL WALL: Fat-containing left inguinal hernia.  BONES: Degenerative changes.  Significant interval decrease in size of a pericardial effusion status   post drain placement. No acute intra-abdominal pathology.    < end of copied text >    PHYSICAL EXAM:  GENERAL: Vomited during exam, brown liquid. No foul smell  CHEST/LUNG: Clear to auscultation bilaterally; No wheeze  HEART: Tachycardia, irregular   ABDOMEN: Soft distension, BS present   EXTREMITIES:  2+ Peripheral Pulses, No clubbing, cyanosis, or edema  PSYCH: AAOx3  NEUROLOGY: non-focal  SKIN: No rashes or lesions    Assessment/Plan: HPI:  88yo Male with PMHx of HTN, HLD, CAD s/p diagnostic [facility] 06/2023 showing severe mLAD disease with severe OM not amendable to stenting, prior LAD + proximal OM stent, PCI/SKYLAR of mLAD x1 (Dr. Wesley) - 08/2023, PVD, LVEF 65-70%, ESRD - on HD diagnosed 6-years ago MWF schedule via AVF left elbow w/ last session 11/20/2023 at [facility] [place] and follows with Dr. Sandra Monte, and anemia presenting to the hospital for pericardial drain ( cath 11/16/23 with effusion), procedure postponed due to hyperkalemia, now status post pericardial drain removal today with post procedural CP , New Afib RVR and persistent nausea, vomiting , possible GIB/Gastritis?    1. Afib RVR in the setting of recent pericardial drain removal: Discussed with cardiology fellow. New afib RVR likely driven by the irritation of the drain coming out. Will given lopressor 5mg x 1 now for HR 140s, /79. Start lopressor 2.5mg IV Q 6 as patient refusing oral tablets due to n/v. Noted dark vomit, unclear if GIB. Will hold on heparin for now. Discussed with Dr. Monahan who agrees. Discussed with Melissa from IR, will do stat cxr, echo and labs now. ( echo department called to facilitate test) . Vital signs Q 4 hours.     2. Chest pain- in the setting of Afib RVR. Will check Cardiac enzymes. Pt with recent cardiac cath 11/16/23 Patent LAD stents. Unchanged OM and small diag disease. Will give tylenol IV ( patient daughter states not a true allergy to tylenol, just does not like pain medications)     3. N/V- No abdominal pain or cramping. Patient with episode of dark vomit while assessment being done. Similar last night. No foul smell. Bowel sounds present and patient states he is passing gas. Possible gastritis? CT 11/26 without obstruction. Protonix IV BID , No GI consult at this time. However if symptoms persist, patient will likely need. Pt ESRD/HD patient, will give small bolus as NPO    4. Couging /mucus production - patient with hx of COPD. Per Dr. Monahan , to start ceftriaxone and azithro for possible exacerbation/bronchitis.     Will follow closely and sign out with detail to proceeding team

## 2023-11-28 NOTE — PROGRESS NOTE ADULT - PROBLEM SELECTOR PLAN 3
Increase in sputum production c/w COPD exacerbation. On trelegy at home  - CTX/azithromycin and prednisone 40 mg daily started 11/28, plan for 5 day course  - c/w therapeutic inhaler equivalents while inpatient  - hypertonic saline, froy ATC New posterior pericardial effusion seen while getting LHC (11/16). Unknown etiology as of yet. Pleural fluid LDH significantly elevated, concerning for exudative fluid  - Hemodynamically stable  - restarted plavix after drain, on aspirin  - s/p pericardial drain placement on 11/24, approximately 800 c of serious fluid was aspirated  - 11/25: [id number] cc of straw-colored fluid from the pericardial drain  - 11/26: about 75 cc in the past 24 hours  - s/p removal of IR drain 11/28, repeat limited TTE to assess effusion in AM  - cardiology onboard

## 2023-11-28 NOTE — PROGRESS NOTE ADULT - SUBJECTIVE AND OBJECTIVE BOX
[facility] Division of Hospital Medicine  Demarcus Monahan MD  Available via MS Teams    SUBJECTIVE / OVERNIGHT EVENTS: Copious phlegm and coughing, causing him to vomit. Denies any chest pain. Minimal drainage from pericardial drain. Peruvian  unavailable. Daughter at bedside providing translation. No bowel movement yesterday. Reportedly had "dark" emesis overnight. States miralax makes him sick.     ADDITIONAL REVIEW OF SYSTEMS:    MEDICATIONS  (STANDING):  albuterol/ipratropium for Nebulization 3 milliLiter(s) Nebulizer every 6 hours  aspirin enteric coated 81 milliGRAM(s) Oral daily  atorvastatin 20 milliGRAM(s) Oral at bedtime  bisacodyl 5 milliGRAM(s) Oral at bedtime  budesonide  80 MICROgram(s)/formoterol 4.5 MICROgram(s) Inhaler 2 Puff(s) Inhalation two times a day  calcium acetate 1334 milliGRAM(s) Oral three times a day with meals  chlorhexidine 2% Cloths 1 Application(s) Topical <User Schedule>  clopidogrel Tablet 75 milliGRAM(s) Oral daily  guaiFENesin Oral Liquid (Sugar-Free) 200 milliGRAM(s) Oral every 6 hours  heparin   Injectable 5000 Unit(s) SubCutaneous every 8 hours  levothyroxine 137 MICROGram(s) Oral daily  metoprolol succinate ER 25 milliGRAM(s) Oral daily  Nephro-lenore 1 Tablet(s) Oral daily  pantoprazole    Tablet 40 milliGRAM(s) Oral before breakfast  sodium chloride 3%  Inhalation 4 milliLiter(s) Inhalation every 12 hours  tamsulosin 0.4 milliGRAM(s) Oral at bedtime  tiotropium 2.5 MICROgram(s) Inhaler 2 Puff(s) Inhalation daily    MEDICATIONS  (PRN):  albuterol    90 MICROgram(s) HFA Inhaler 2 Puff(s) Inhalation every 6 hours PRN Shortness of Breath and/or Wheezing  bisacodyl 5 milliGRAM(s) Oral every 12 hours PRN Constipation  melatonin 3 milliGRAM(s) Oral at bedtime PRN Insomnia  midodrine. 10 milliGRAM(s) Oral <User Schedule> PRN Low BP prior to HD  ondansetron Injectable 4 milliGRAM(s) IV Push every 8 hours PRN Nausea and/or Vomiting  oxyCODONE    IR 10 milliGRAM(s) Oral every 6 hours PRN Severe Pain (7 - 10)      I&O's Summary    27 Nov 2023 07:01  -  28 Nov 2023 07:00  --------------------------------------------------------  IN: 800 mL / OUT: 800 mL / NET: 0 mL      PHYSICAL EXAM:  Vital Signs Last 24 Hrs  T(C): 36.6 (28 Nov 2023 12:30), Max: 37 (27 Nov 2023 20:03)  T(F): 97.9 (28 Nov 2023 12:30), Max: 98.6 (27 Nov 2023 20:03)  HR: 75 (28 Nov 2023 12:30) (61 - 75)  BP: 132/66 (28 Nov 2023 12:30) (132/66 - 168/75)  BP(mean): 115 (27 Nov 2023 19:09) (115 - 115)  RR: 18 (28 Nov 2023 12:30) (18 - 18)  SpO2: 94% (28 Nov 2023 12:30) (93% - 94%)    Parameters below as of 28 Nov 2023 12:30  Patient On (Oxygen Delivery Method): nasal cannula  O2 Flow (L/min): 2    CONSTITUTIONAL: coughing up thick phlegm   RESPIRATORY: coughing, expiratory wheezing  CARDIOVASCULAR: Regular rate and rhythm, normal S1 and S2, no murmur/rub/gallop; No lower extremity edema  ABDOMEN: Nontender to palpation, normoactive bowel sounds  MUSCULOSKELETAL: no clubbing or cyanosis of digits; no joint swelling or tenderness to palpation  PSYCH: A+O to person, place, and time; affect appropriate  NEUROLOGY: CN 2-12 are intact and symmetric; no gross sensory deficits   SKIN: No rashes; no palpable lesions    LABS:                        10.8   6.42  )-----------( 238      ( 27 Nov 2023 05:38 )             33.3     11-27    141  |  94<L>  |  60<H>  ----------------------------<  89  4.6   |  27  |  9.89<H>    Ca    8.7      27 Nov 2023 05:37  Phos  7.3     11-27  Mg     2.6     11-27            Urinalysis Basic - ( 27 Nov 2023 05:37 )    Color: x / Appearance: x / SG: x / pH: x  Gluc: 89 mg/dL / Ketone: x  / Bili: x / Urobili: x   Blood: x / Protein: x / Nitrite: x   Leuk Esterase: x / RBC: x / WBC x   Sq Epi: x / Non Sq Epi: x / Bacteria: x        Culture - Acid Fast - Body Fluid w/Smear (collected 25 Nov 2023 18:21)  Source: .Body Fluid Other    Culture - Body Fluid with Gram Stain (collected 25 Nov 2023 18:21)  Source: .Body Fluid Other  Gram Stain (26 Nov 2023 01:51):    polymorphonuclear leukocytes seen    No organisms seen    by cytocentrifuge  Preliminary Report (26 Nov 2023 18:55):    No growth    Culture - Fungal, Body Fluid (collected 25 Nov 2023 18:21)  Source: Pericardial Pericardial Fluid  Preliminary Report (26 Nov 2023 11:52):    Testing in progress      SARS-CoV-2: [facility] (26 Nov 2023 12:30)            RADIOLOGY & ADDITIONAL TESTS:  New Results Reviewed Today:   New Imaging Personally Reviewed Today:  New Electrocardiogram Personally Reviewed Today:  Prior or Outpatient Records Reviewed Today:    COMMUNICATION:  Care Discussed with Consultants/Other Providers and Details of Discussion: Discussed with ACP  Discussions with Patient/Family:  PCP Communication:

## 2023-11-28 NOTE — PROGRESS NOTE ADULT - PROBLEM SELECTOR PLAN 6
Chest pain free. s/p diagnostic cath on 11/16. Has  85 % stenosis in distal Lcx. 40 % stenosis in LAD, unchanged from prior  - c/w aspirin, plavix, statin. Palvix resumed 11/26  - cardiology is onboard, appreciate recs

## 2023-11-28 NOTE — CHART NOTE - NSCHARTNOTEFT_GEN_A_CORE
Pericardial drain removed by IR today at bedside.  - recommend limited TTE tomorrow AM  -- if stable, no further cardiac evaluation needed  - please set up follow up with cardiology. patient will require follow up TTE as outpatient for monitoring    Darin Haney MD  PGY-4, Cardiology Fellow    Please check [email or web address] password: [email or web address]" for cardiology service schedule and contact information.   *Please note that all recommendations are incomplete until attending attestation*

## 2023-11-28 NOTE — PROGRESS NOTE ADULT - PROBLEM SELECTOR PLAN 1
New posterior pericardial effusion seen while getting LHC (11/16). Unknown etiology as of yet. Pleural fluid LDH significantly elevated, concerning for exudative fluid  - Hemodynamically stable  - restarted plavix after drain, on aspirin  - s/p pericardial drain placement on 11/24, approximately 800 c of serious fluid was aspirated  - 11/25: [id number] cc of straw-colored fluid from the pericardial drain  - 11/26: about 75 cc in the past 24 hours  - s/p removal of IR drain 11/28, repeat limited TTE to assess effusion in AM  - cardiology onboard Increase in sputum production c/w COPD exacerbation. On trelegy at home  - CTX/azithromycin and solumedrol IV 40 mg daily started 11/28, plan for 5 day course  - c/w therapeutic inhaler equivalents while inpatient  - hypertonic saline, froy ATC

## 2023-11-28 NOTE — PROGRESS NOTE ADULT - SUBJECTIVE AND OBJECTIVE BOX
Interventional Radiology Follow-Up Note    This is a 87 year old man with HTN, hypothyroidism, and ESRD on HD via AVF.  Also, hx. of CAD s/p PCI to LAD 8/2023 and with OM lesion not amenable to PCI.  Presented with angina like symptoms and was found to have large pericardial effusion. Coronary angiography showed no new obstructive CAD. S/P pericardial drain placement by IR on 11/24/23 by MD Haynes, straw colored clear fluid, 800cc initially removed.    S: Patient seen and examined @ bedside. c/o pain at pericardial drain insertion site    Medication:     aspirin enteric coated: (11-28)  clopidogrel Tablet: (11-28)  heparin   Injectable: (11-28)  metoprolol succinate ER: (11-27)    Vitals:   T(F): 97.9, Max: 98.6 (20:03)  HR: 75  BP: 132/66  RR: 18  SpO2: 94%    Physical Exam:  General: Nontoxic, in NAD, A&O x3.  Resp: bilateral breath sounds  Drain Device: Drain intact attached to pleuravac, waterseal. Dressing clean, dry, intact. Pericardial drain removed at bedside without incident. Dry sterile dressing applied.     24hr Drain output: 0ml    LABS:                            10.8   6.42  )-----------( 238      ( 27 Nov 2023 05:38 )             33.3     11-27    141  |  94<L>  |  60<H>  ----------------------------<  89  4.6   |  27  |  9.89<H>    Ca    8.7      27 Nov 2023 05:37  Phos  7.3     11-27  Mg     2.6     11-27              Assessment/Plan:  This is a 87 year old man with HTN, hypothyroidism, and ESRD on HD via AVF.  Also, hx. of CAD s/p PCI to LAD 8/2023 and with OM lesion not amenable to PCI.  Presented with angina like symptoms and was found to have large pericardial effusion. Coronary angiography showed no new obstructive CAD. S/P pericardial drain placement by IR on 11/24/23 by MD Haynes, straw colored clear fluid, 800cc initially removed.        -continue global management per primary team  - CT from 11/26 Significant interval decrease in pericardial effusion with only small fluid remaining. Pericardial drain in place  -  TTE W or WO Ultrasound Enhancing Agent 11/27 > 1. Compared to the transthoracic echocardiogram performed on 11/25/2023 there have been no significant interval changes.  2. Small pericardial effusion noted adjacent to the posterior left ventricle and small pericardial effusion noted adjacent to the right atrium with no evidence of hemodynamic compromise.  - Discussed CT and TTE findings with MD Demarco, given minimal effusion on CT and echo and minimial drain output, decision was made to remove pericardial drain at bedside. Pericardial drain removed without inicident, dry sterile dressing applied. Keep dressing dry for 48hours. Pt states pain is much improved post removal.   - IR will sign off  - Greater than 50% of the encounter was spent counseling and/or coordination of care on drain management and follow up.        Please call IR at extension [phone] with any questions, concerns, or issues regarding above.       Interventional Radiology Follow-Up Note    This is a 87 year old man with HTN, hypothyroidism, and ESRD on HD via AVF.  Also, hx. of CAD s/p PCI to LAD 8/2023 and with OM lesion not amenable to PCI.  Presented with angina like symptoms and was found to have large pericardial effusion. Coronary angiography showed no new obstructive CAD. S/P pericardial drain placement by IR on 11/24/23 by MD Haynes, straw colored clear fluid, 800cc initially removed.    S: Patient seen and examined @ bedside. c/o pain at pericardial drain insertion site    Medication:     aspirin enteric coated: (11-28)  clopidogrel Tablet: (11-28)  heparin   Injectable: (11-28)  metoprolol succinate ER: (11-27)    Vitals:   T(F): 97.9, Max: 98.6 (20:03)  HR: 75  BP: 132/66  RR: 18  SpO2: 94%    Physical Exam:  General: Nontoxic, in NAD, A&O x3.  Resp: bilateral breath sounds  Drain Device: Drain intact attached to pleuravac, waterseal. Dressing clean, dry, intact. Pericardial drain removed at bedside without incident. Dry sterile dressing applied.     24hr Drain output: 0ml    LABS:                            10.8   6.42  )-----------( 238      ( 27 Nov 2023 05:38 )             33.3     11-27    141  |  94<L>  |  60<H>  ----------------------------<  89  4.6   |  27  |  9.89<H>    Ca    8.7      27 Nov 2023 05:37  Phos  7.3     11-27  Mg     2.6     11-27              Assessment/Plan:  This is a 87 year old man with HTN, hypothyroidism, and ESRD on HD via AVF.  Also, hx. of CAD s/p PCI to LAD 8/2023 and with OM lesion not amenable to PCI.  Presented with angina like symptoms and was found to have large pericardial effusion. Coronary angiography showed no new obstructive CAD. S/P pericardial drain placement by IR on 11/24/23 by MD Haynes, straw colored clear fluid, 800cc initially removed.        -continue global management per primary team  - CT from 11/26 Significant interval decrease in pericardial effusion with only small fluid remaining. Pericardial drain in place  -  TTE W or WO Ultrasound Enhancing Agent 11/27 > 1. Compared to the transthoracic echocardiogram performed on 11/25/2023 there have been no significant interval changes.  2. Small pericardial effusion noted adjacent to the posterior left ventricle and small pericardial effusion noted adjacent to the right atrium with no evidence of hemodynamic compromise.  - Discussed CT and TTE findings with MD Demarco, given minimal effusion on CT and echo and minimial drain output, decision was made to remove pericardial drain at bedside. Pericardial drain removed without inicident, dry sterile dressing applied. Keep dressing dry for 48hours. Pt states pain is much improved post removal.   - IR will sign off  - Greater than 50% of the encounter was spent counseling and/or coordination of care on drain management and follow up.        Please call IR at extension [phone] with any questions, concerns, or issues regarding above.       Interventional Radiology Follow-Up Note    This is a 87 year old man with HTN, hypothyroidism, and ESRD on HD via AVF.  Also, hx. of CAD s/p PCI to LAD 8/2023 and with OM lesion not amenable to PCI.  Presented with angina like symptoms and was found to have large pericardial effusion. Coronary angiography showed no new obstructive CAD. S/P pericardial drain placement by IR on 11/24/23 by MD Haynes, straw colored clear fluid, 800cc initially removed.    S: Patient seen and examined @ bedside. c/o pain at pericardial drain insertion site    Medication:     aspirin enteric coated: (11-28)  clopidogrel Tablet: (11-28)  heparin   Injectable: (11-28)  metoprolol succinate ER: (11-27)    Vitals:   T(F): 97.9, Max: 98.6 (20:03)  HR: 75  BP: 132/66  RR: 18  SpO2: 94%    Physical Exam:  General: Nontoxic, in NAD, A&O x3.  Resp: bilateral breath sounds  Drain Device: Drain intact attached to pleuravac, waterseal. Dressing clean, dry, intact. Pericardial drain removed at bedside without incident. Dry sterile dressing applied.     24hr Drain output: 0ml    LABS:                            10.8   6.42  )-----------( 238      ( 27 Nov 2023 05:38 )             33.3     11-27    141  |  94<L>  |  60<H>  ----------------------------<  89  4.6   |  27  |  9.89<H>    Ca    8.7      27 Nov 2023 05:37  Phos  7.3     11-27  Mg     2.6     11-27              Assessment/Plan:  This is a 87 year old man with HTN, hypothyroidism, and ESRD on HD via AVF.  Also, hx. of CAD s/p PCI to LAD 8/2023 and with OM lesion not amenable to PCI.  Presented with angina like symptoms and was found to have large pericardial effusion. Coronary angiography showed no new obstructive CAD. S/P pericardial drain placement by IR on 11/24/23 by MD Haynes, straw colored clear fluid, 800cc initially removed.        -continue global management per primary team  - CT from 11/26 Significant interval decrease in pericardial effusion with only small fluid remaining. Pericardial drain in place  -  TTE W or WO Ultrasound Enhancing Agent 11/27 > 1. Compared to the transthoracic echocardiogram performed on 11/25/2023 there have been no significant interval changes.  2. Small pericardial effusion noted adjacent to the posterior left ventricle and small pericardial effusion noted adjacent to the right atrium with no evidence of hemodynamic compromise.  - Discussed CT and TTE findings with MD Demarco, given minimal effusion on CT and echo and minimial drain output, decision was made to remove pericardial drain at bedside. Pericardial drain removed without inicident, dry sterile dressing applied. Keep dressing dry for 48hours. Pt states pain is much improved post removal.   - IR will sign off  - Greater than 50% of the encounter was spent counseling and/or coordination of care on drain management and follow up.        Please call IR at extension [phone] with any questions, concerns, or issues regarding above.       Interventional Radiology Follow-Up Note    This is a 87 year old man with HTN, hypothyroidism, and ESRD on HD via AVF.  Also, hx. of CAD s/p PCI to LAD 8/2023 and with OM lesion not amenable to PCI.  Presented with angina like symptoms and was found to have large pericardial effusion. Coronary angiography showed no new obstructive CAD. S/P pericardial drain placement by IR on 11/24/23 by MD Haynes, straw colored clear fluid, 800cc initially removed.    S: Patient seen and examined @ bedside. c/o pain at pericardial drain insertion site    Medication:     aspirin enteric coated: (11-28)  clopidogrel Tablet: (11-28)  heparin   Injectable: (11-28)  metoprolol succinate ER: (11-27)    Vitals:   T(F): 97.9, Max: 98.6 (20:03)  HR: 75  BP: 132/66  RR: 18  SpO2: 94%    Physical Exam:  General: Nontoxic, in NAD, A&O x3.  Resp: bilateral breath sounds  Drain Device: Drain intact attached to pleuravac, waterseal. Dressing clean, dry, intact. Pericardial drain removed at bedside without incident. Dry sterile dressing applied.     24hr Drain output: 0ml    LABS:                            10.8   6.42  )-----------( 238      ( 27 Nov 2023 05:38 )             33.3     11-27    141  |  94<L>  |  60<H>  ----------------------------<  89  4.6   |  27  |  9.89<H>    Ca    8.7      27 Nov 2023 05:37  Phos  7.3     11-27  Mg     2.6     11-27              Assessment/Plan:  This is a 87 year old man with HTN, hypothyroidism, and ESRD on HD via AVF.  Also, hx. of CAD s/p PCI to LAD 8/2023 and with OM lesion not amenable to PCI.  Presented with angina like symptoms and was found to have large pericardial effusion. Coronary angiography showed no new obstructive CAD. S/P pericardial drain placement by IR on 11/24/23 by MD Haynes, straw colored clear fluid, 800cc initially removed.        -continue global management per primary team  - CT from 11/26 Significant interval decrease in pericardial effusion with only small fluid remaining. Pericardial drain in place  -  TTE W or WO Ultrasound Enhancing Agent 11/27 > 1. Compared to the transthoracic echocardiogram performed on 11/25/2023 there have been no significant interval changes.  2. Small pericardial effusion noted adjacent to the posterior left ventricle and small pericardial effusion noted adjacent to the right atrium with no evidence of hemodynamic compromise.  - Discussed CT and TTE findings with MD Demarco, given minimal effusion on CT and echo and minimial drain output, decision was made to remove pericardial drain at bedside. Pericardial drain removed without inicident, dry sterile dressing applied. Keep dressing dry for 48hours. Pt states pain is much improved post removal.   - IR will sign off  - Greater than 50% of the encounter was spent counseling and/or coordination of care on drain management and follow up.        Please call IR at extension [phone] with any questions, concerns, or issues regarding above.       Interventional Radiology Follow-Up Note    This is a 87 year old man with HTN, hypothyroidism, and ESRD on HD via AVF.  Also, hx. of CAD s/p PCI to LAD 8/2023 and with OM lesion not amenable to PCI.  Presented with angina like symptoms and was found to have large pericardial effusion. Coronary angiography showed no new obstructive CAD. S/P pericardial drain placement by IR on 11/24/23 by MD Haynes, straw colored clear fluid, 800cc initially removed.    S: Patient seen and examined @ bedside. c/o pain at pericardial drain insertion site    Medication:     aspirin enteric coated: (11-28)  clopidogrel Tablet: (11-28)  heparin   Injectable: (11-28)  metoprolol succinate ER: (11-27)    Vitals:   T(F): 97.9, Max: 98.6 (20:03)  HR: 75  BP: 132/66  RR: 18  SpO2: 94%    Physical Exam:  General: Nontoxic, in NAD, A&O x3.  Resp: bilateral breath sounds  Drain Device: Drain intact attached to pleuravac, waterseal. Dressing clean, dry, intact. Pericardial drain removed at bedside without incident. Dry sterile dressing applied.     24hr Drain output: 0ml    LABS:                            10.8   6.42  )-----------( 238      ( 27 Nov 2023 05:38 )             33.3     11-27    141  |  94<L>  |  60<H>  ----------------------------<  89  4.6   |  27  |  9.89<H>    Ca    8.7      27 Nov 2023 05:37  Phos  7.3     11-27  Mg     2.6     11-27              Assessment/Plan:  This is a 87 year old man with HTN, hypothyroidism, and ESRD on HD via AVF.  Also, hx. of CAD s/p PCI to LAD 8/2023 and with OM lesion not amenable to PCI.  Presented with angina like symptoms and was found to have large pericardial effusion. Coronary angiography showed no new obstructive CAD. S/P pericardial drain placement by IR on 11/24/23 by MD Haynes, straw colored clear fluid, 800cc initially removed.        -continue global management per primary team  - CT from 11/26 Significant interval decrease in pericardial effusion with only small fluid remaining. Pericardial drain in place  -  TTE W or WO Ultrasound Enhancing Agent 11/27 > 1. Compared to the transthoracic echocardiogram performed on 11/25/2023 there have been no significant interval changes.  2. Small pericardial effusion noted adjacent to the posterior left ventricle and small pericardial effusion noted adjacent to the right atrium with no evidence of hemodynamic compromise.  - Discussed CT and TTE findings with MD Demarco, given minimal effusion on CT and echo and minimial drain output, decision was made to remove pericardial drain at bedside. Pericardial drain removed without inicident, dry sterile dressing applied. Keep dressing dry for 48hours. Pt states pain is much improved post removal.   - IR will sign off  - Greater than 50% of the encounter was spent counseling and/or coordination of care on drain management and follow up.        Please call IR at extension [phone] with any questions, concerns, or issues regarding above.       Interventional Radiology Follow-Up Note    This is a 87 year old man with HTN, hypothyroidism, and ESRD on HD via AVF.  Also, hx. of CAD s/p PCI to LAD 8/2023 and with OM lesion not amenable to PCI.  Presented with angina like symptoms and was found to have large pericardial effusion. Coronary angiography showed no new obstructive CAD. S/P pericardial drain placement by IR on 11/24/23 by MD Haynes, straw colored clear fluid, 800cc initially removed.    S: Patient seen and examined @ bedside. c/o pain at pericardial drain insertion site    Medication:     aspirin enteric coated: (11-28)  clopidogrel Tablet: (11-28)  heparin   Injectable: (11-28)  metoprolol succinate ER: (11-27)    Vitals:   T(F): 97.9, Max: 98.6 (20:03)  HR: 75  BP: 132/66  RR: 18  SpO2: 94%    Physical Exam:  General: Nontoxic, in NAD, A&O x3.  Resp: bilateral breath sounds  Drain Device: Drain intact attached to pleuravac, waterseal. Dressing clean, dry, intact. Pericardial drain removed at bedside without incident. Dry sterile dressing applied.     24hr Drain output: 0ml    LABS:                            10.8   6.42  )-----------( 238      ( 27 Nov 2023 05:38 )             33.3     11-27    141  |  94<L>  |  60<H>  ----------------------------<  89  4.6   |  27  |  9.89<H>    Ca    8.7      27 Nov 2023 05:37  Phos  7.3     11-27  Mg     2.6     11-27              Assessment/Plan:  This is a 87 year old man with HTN, hypothyroidism, and ESRD on HD via AVF.  Also, hx. of CAD s/p PCI to LAD 8/2023 and with OM lesion not amenable to PCI.  Presented with angina like symptoms and was found to have large pericardial effusion. Coronary angiography showed no new obstructive CAD. S/P pericardial drain placement by IR on 11/24/23 by MD Haynes, straw colored clear fluid, 800cc initially removed.        -continue global management per primary team  - CT from 11/26 Significant interval decrease in pericardial effusion with only small fluid remaining. Pericardial drain in place  -  TTE W or WO Ultrasound Enhancing Agent 11/27 > 1. Compared to the transthoracic echocardiogram performed on 11/25/2023 there have been no significant interval changes.  2. Small pericardial effusion noted adjacent to the posterior left ventricle and small pericardial effusion noted adjacent to the right atrium with no evidence of hemodynamic compromise.  - Discussed CT and TTE findings with MD Demarco, given minimal effusion on CT and echo and minimial drain output, decision was made to remove pericardial drain at bedside. Pericardial drain removed without inicident, dry sterile dressing applied. Keep dressing dry for 48hours. Pt states pain is much improved post removal.   - IR will sign off  - Greater than 50% of the encounter was spent counseling and/or coordination of care on drain management and follow up.        Please call IR at extension [phone] with any questions, concerns, or issues regarding above.

## 2023-11-28 NOTE — PROGRESS NOTE ADULT - PROBLEM SELECTOR PLAN 4
Reportedly in setting of excessive coughing  - continue zofran 4mg q8hr PRN for nausea  - obtain CT chest, abdomen and pelvis showed no obstruction

## 2023-11-28 NOTE — CHART NOTE - NSCHARTNOTEFT_GEN_A_CORE
Called by primary team, patient with new onset afib HR in 140's. Patient seen and examined at bedside. Reports mid burning like chest pain. had multiple episodes of emesis today mostly clear with mucous however, had recent episode of dark brown mucous like emesis. Patient reports having similar episode earlier this morning. Dressing over prior pericardial site c/d/i, no hematoma appreciated. Patient s/p lopressor with response, no in Sinus rhythm.     -Recommended STAT echo to assess pericardial effusion- now done awaiting report.   -STAT cbc and coags  -pain management per primary team  -Will follow up

## 2023-11-28 NOTE — PROGRESS NOTE ADULT - PROBLEM SELECTOR PLAN 2
Placed on NC 11/25, hx of COPD not on home O2  COPD exacerbation  Maintain SaO2 88-92% with supplemental O2  Treat COPD exacerbation below Placed on NC 11/25, hx of COPD not on home O2  COPD exacerbation  Maintain SaO2 88-92% with supplemental O2  Treat COPD exacerbation as above

## 2023-11-28 NOTE — PROGRESS NOTE ADULT - ASSESSMENT
87 year old male with PMHx of HTN, HLD, CAD s/p diagnostic [facility] [place] 06/2023 showing severe mLAD disease with severe OM not amendable to stenting, prior LAD + proximal OM stent, PCI/SKYLAR of mLAD x1 (Dr. Wesley) - 08/2023, PVD, ESRD - on HD MWF who initially presented for ambulatory pericardial drain. Procedure was cancelled due to hyperkalemia to 6.4, and the patient was admitted for further care.  He is s/p nonurgent dialysis on 11/22, now potassium is normalized. Pt is now s/p IR guided pericardial drain placement, on 11/24.

## 2023-11-28 NOTE — PROGRESS NOTE ADULT - TIME BILLING
- Ordering, reviewing, and interpreting labs, testing, and imaging.  - Independently obtaining a review of systems and performing a physical exam  - Reviewing consultant documentation/recommendations.  - Counselling and educating patient regarding interpretation of aforementioned items and plan of care.
- Ordering, reviewing, and/or interpreting labs, testing, and/or imaging  - Independently obtaining a review of systems and performing a physical exam  - Reviewing consultant documentation/recommendations where applicable  - Counselling and educating patient and/or family regarding interpretation of aforementioned items and plan of care
- Ordering, reviewing, and interpreting labs, testing, and imaging.  - Independently obtaining a review of systems and performing a physical exam  - Counselling and educating patient regarding interpretation of aforementioned items and plan of care.
- Ordering, reviewing, and/or interpreting labs, testing, and/or imaging  - Independently obtaining a review of systems and performing a physical exam  - Reviewing consultant documentation/recommendations where applicable  - Counselling and educating patient and/or family regarding interpretation of aforementioned items and plan of care
- Ordering, reviewing, and interpreting labs, testing, and imaging.  - Independently obtaining a review of systems and performing a physical exam  - Reviewing consultant documentation/recommendations.  - Counselling and educating patient regarding interpretation of aforementioned items and plan of care.
- Ordering, reviewing, and interpreting labs, testing, and imaging.  - Independently obtaining a review of systems and performing a physical exam  - Reviewing consultant documentation/recommendations.  - Counselling and educating patient regarding interpretation of aforementioned items and plan of care.
- Ordering, reviewing, and interpreting labs, testing, and imaging.  - Independently obtaining a review of systems and performing a physical exam  - Reviewing consultant documentation/recommendations in addition to discussing plan of care with consultants.  - Counselling and educating patient regarding interpretation of aforementioned items and plan of care.

## 2023-11-29 DIAGNOSIS — I48.0 PAROXYSMAL ATRIAL FIBRILLATION: ICD-10-CM

## 2023-11-29 LAB
24R-OH-CALCIDIOL SERPL-MCNC: 29.2 NG/ML — LOW (ref 30–80)
24R-OH-CALCIDIOL SERPL-MCNC: 29.2 NG/ML — LOW (ref 30–80)
ANION GAP SERPL CALC-SCNC: 17 MMOL/L — SIGNIFICANT CHANGE UP (ref 5–17)
ANION GAP SERPL CALC-SCNC: 17 MMOL/L — SIGNIFICANT CHANGE UP (ref 5–17)
BASE EXCESS BLDV CALC-SCNC: 5.2 MMOL/L — HIGH (ref -2–3)
BASE EXCESS BLDV CALC-SCNC: 5.2 MMOL/L — HIGH (ref -2–3)
BLD GP AB SCN SERPL QL: NEGATIVE — SIGNIFICANT CHANGE UP
BLD GP AB SCN SERPL QL: NEGATIVE — SIGNIFICANT CHANGE UP
BUN SERPL-MCNC: 64 MG/DL — HIGH (ref 7–23)
BUN SERPL-MCNC: 64 MG/DL — HIGH (ref 7–23)
CA-I SERPL-SCNC: 0.98 MMOL/L — LOW (ref 1.15–1.33)
CA-I SERPL-SCNC: 0.98 MMOL/L — LOW (ref 1.15–1.33)
CALCIUM SERPL-MCNC: 8.1 MG/DL — LOW (ref 8.4–10.5)
CALCIUM SERPL-MCNC: 8.1 MG/DL — LOW (ref 8.4–10.5)
CALCIUM SERPL-MCNC: 8.4 MG/DL — SIGNIFICANT CHANGE UP (ref 8.4–10.5)
CALCIUM SERPL-MCNC: 8.4 MG/DL — SIGNIFICANT CHANGE UP (ref 8.4–10.5)
CHLORIDE BLDV-SCNC: 96 MMOL/L — SIGNIFICANT CHANGE UP (ref 96–108)
CHLORIDE BLDV-SCNC: 96 MMOL/L — SIGNIFICANT CHANGE UP (ref 96–108)
CHLORIDE SERPL-SCNC: 91 MMOL/L — LOW (ref 96–108)
CHLORIDE SERPL-SCNC: 91 MMOL/L — LOW (ref 96–108)
CO2 BLDV-SCNC: 32 MMOL/L — HIGH (ref 22–26)
CO2 BLDV-SCNC: 32 MMOL/L — HIGH (ref 22–26)
CO2 SERPL-SCNC: 31 MMOL/L — SIGNIFICANT CHANGE UP (ref 22–31)
CO2 SERPL-SCNC: 31 MMOL/L — SIGNIFICANT CHANGE UP (ref 22–31)
CREAT SERPL-MCNC: 8.78 MG/DL — HIGH (ref 0.5–1.3)
CREAT SERPL-MCNC: 8.78 MG/DL — HIGH (ref 0.5–1.3)
EGFR: 5 ML/MIN/1.73M2 — LOW
EGFR: 5 ML/MIN/1.73M2 — LOW
FERRITIN SERPL-MCNC: 1451 NG/ML — HIGH (ref 30–400)
FERRITIN SERPL-MCNC: 1451 NG/ML — HIGH (ref 30–400)
GAS PNL BLDV: 134 MMOL/L — LOW (ref 136–145)
GAS PNL BLDV: 134 MMOL/L — LOW (ref 136–145)
GAS PNL BLDV: SIGNIFICANT CHANGE UP
GLUCOSE BLDV-MCNC: 118 MG/DL — HIGH (ref 70–99)
GLUCOSE BLDV-MCNC: 118 MG/DL — HIGH (ref 70–99)
GLUCOSE SERPL-MCNC: 104 MG/DL — HIGH (ref 70–99)
GLUCOSE SERPL-MCNC: 104 MG/DL — HIGH (ref 70–99)
GRAM STN FLD: ABNORMAL
GRAM STN FLD: ABNORMAL
HCO3 BLDV-SCNC: 31 MMOL/L — HIGH (ref 22–29)
HCO3 BLDV-SCNC: 31 MMOL/L — HIGH (ref 22–29)
HCT VFR BLD CALC: 32.6 % — LOW (ref 39–50)
HCT VFR BLD CALC: 32.6 % — LOW (ref 39–50)
HCT VFR BLDA CALC: 26 % — LOW (ref 39–51)
HCT VFR BLDA CALC: 26 % — LOW (ref 39–51)
HGB BLD CALC-MCNC: 8.6 G/DL — LOW (ref 12.6–17.4)
HGB BLD CALC-MCNC: 8.6 G/DL — LOW (ref 12.6–17.4)
HGB BLD-MCNC: 11 G/DL — LOW (ref 13–17)
HGB BLD-MCNC: 11 G/DL — LOW (ref 13–17)
INR BLD: 1.15 RATIO — SIGNIFICANT CHANGE UP (ref 0.85–1.18)
INR BLD: 1.15 RATIO — SIGNIFICANT CHANGE UP (ref 0.85–1.18)
IRON SATN MFR SERPL: 41 % — SIGNIFICANT CHANGE UP (ref 16–55)
IRON SATN MFR SERPL: 41 % — SIGNIFICANT CHANGE UP (ref 16–55)
IRON SATN MFR SERPL: 78 UG/DL — SIGNIFICANT CHANGE UP (ref 45–165)
IRON SATN MFR SERPL: 78 UG/DL — SIGNIFICANT CHANGE UP (ref 45–165)
LACTATE BLDV-MCNC: 1.7 MMOL/L — SIGNIFICANT CHANGE UP (ref 0.5–2)
LACTATE BLDV-MCNC: 1.7 MMOL/L — SIGNIFICANT CHANGE UP (ref 0.5–2)
MAGNESIUM SERPL-MCNC: 2.4 MG/DL — SIGNIFICANT CHANGE UP (ref 1.6–2.6)
MAGNESIUM SERPL-MCNC: 2.4 MG/DL — SIGNIFICANT CHANGE UP (ref 1.6–2.6)
MCHC RBC-ENTMCNC: 32.1 PG — SIGNIFICANT CHANGE UP (ref 27–34)
MCHC RBC-ENTMCNC: 32.1 PG — SIGNIFICANT CHANGE UP (ref 27–34)
MCHC RBC-ENTMCNC: 33.7 GM/DL — SIGNIFICANT CHANGE UP (ref 32–36)
MCHC RBC-ENTMCNC: 33.7 GM/DL — SIGNIFICANT CHANGE UP (ref 32–36)
MCV RBC AUTO: 95 FL — SIGNIFICANT CHANGE UP (ref 80–100)
MCV RBC AUTO: 95 FL — SIGNIFICANT CHANGE UP (ref 80–100)
NRBC # BLD: 0 /100 WBCS — SIGNIFICANT CHANGE UP (ref 0–0)
NRBC # BLD: 0 /100 WBCS — SIGNIFICANT CHANGE UP (ref 0–0)
PCO2 BLDV: 51 MMHG — SIGNIFICANT CHANGE UP (ref 42–55)
PCO2 BLDV: 51 MMHG — SIGNIFICANT CHANGE UP (ref 42–55)
PH BLDV: 7.39 — SIGNIFICANT CHANGE UP (ref 7.32–7.43)
PH BLDV: 7.39 — SIGNIFICANT CHANGE UP (ref 7.32–7.43)
PHOSPHATE SERPL-MCNC: 8.7 MG/DL — HIGH (ref 2.5–4.5)
PHOSPHATE SERPL-MCNC: 8.7 MG/DL — HIGH (ref 2.5–4.5)
PLATELET # BLD AUTO: 334 K/UL — SIGNIFICANT CHANGE UP (ref 150–400)
PLATELET # BLD AUTO: 334 K/UL — SIGNIFICANT CHANGE UP (ref 150–400)
PO2 BLDV: 48 MMHG — HIGH (ref 25–45)
PO2 BLDV: 48 MMHG — HIGH (ref 25–45)
POTASSIUM BLDV-SCNC: 5.3 MMOL/L — HIGH (ref 3.5–5.1)
POTASSIUM BLDV-SCNC: 5.3 MMOL/L — HIGH (ref 3.5–5.1)
POTASSIUM SERPL-MCNC: 5.1 MMOL/L — SIGNIFICANT CHANGE UP (ref 3.5–5.3)
POTASSIUM SERPL-MCNC: 5.1 MMOL/L — SIGNIFICANT CHANGE UP (ref 3.5–5.3)
POTASSIUM SERPL-SCNC: 5.1 MMOL/L — SIGNIFICANT CHANGE UP (ref 3.5–5.3)
POTASSIUM SERPL-SCNC: 5.1 MMOL/L — SIGNIFICANT CHANGE UP (ref 3.5–5.3)
PROTHROM AB SERPL-ACNC: 12.6 SEC — SIGNIFICANT CHANGE UP (ref 9.5–13)
PROTHROM AB SERPL-ACNC: 12.6 SEC — SIGNIFICANT CHANGE UP (ref 9.5–13)
PTH-INTACT FLD-MCNC: 133 PG/ML — HIGH (ref 15–65)
PTH-INTACT FLD-MCNC: 133 PG/ML — HIGH (ref 15–65)
RBC # BLD: 3.43 M/UL — LOW (ref 4.2–5.8)
RBC # BLD: 3.43 M/UL — LOW (ref 4.2–5.8)
RBC # FLD: 14 % — SIGNIFICANT CHANGE UP (ref 10.3–14.5)
RBC # FLD: 14 % — SIGNIFICANT CHANGE UP (ref 10.3–14.5)
RH IG SCN BLD-IMP: POSITIVE — SIGNIFICANT CHANGE UP
RH IG SCN BLD-IMP: POSITIVE — SIGNIFICANT CHANGE UP
SAO2 % BLDV: 70.6 % — SIGNIFICANT CHANGE UP (ref 67–88)
SAO2 % BLDV: 70.6 % — SIGNIFICANT CHANGE UP (ref 67–88)
SODIUM SERPL-SCNC: 139 MMOL/L — SIGNIFICANT CHANGE UP (ref 135–145)
SODIUM SERPL-SCNC: 139 MMOL/L — SIGNIFICANT CHANGE UP (ref 135–145)
SPECIMEN SOURCE: SIGNIFICANT CHANGE UP
SPECIMEN SOURCE: SIGNIFICANT CHANGE UP
TIBC SERPL-MCNC: 189 UG/DL — LOW (ref 220–430)
TIBC SERPL-MCNC: 189 UG/DL — LOW (ref 220–430)
TROPONIN T, HIGH SENSITIVITY RESULT: 111 NG/L — HIGH (ref 0–51)
TROPONIN T, HIGH SENSITIVITY RESULT: 111 NG/L — HIGH (ref 0–51)
UIBC SERPL-MCNC: 111 UG/DL — SIGNIFICANT CHANGE UP (ref 110–370)
UIBC SERPL-MCNC: 111 UG/DL — SIGNIFICANT CHANGE UP (ref 110–370)
WBC # BLD: 8.19 K/UL — SIGNIFICANT CHANGE UP (ref 3.8–10.5)
WBC # BLD: 8.19 K/UL — SIGNIFICANT CHANGE UP (ref 3.8–10.5)
WBC # FLD AUTO: 8.19 K/UL — SIGNIFICANT CHANGE UP (ref 3.8–10.5)
WBC # FLD AUTO: 8.19 K/UL — SIGNIFICANT CHANGE UP (ref 3.8–10.5)

## 2023-11-29 PROCEDURE — 99232 SBSQ HOSP IP/OBS MODERATE 35: CPT | Mod: GC

## 2023-11-29 PROCEDURE — 99231 SBSQ HOSP IP/OBS SF/LOW 25: CPT

## 2023-11-29 PROCEDURE — 99233 SBSQ HOSP IP/OBS HIGH 50: CPT

## 2023-11-29 PROCEDURE — 93321 DOPPLER ECHO F-UP/LMTD STD: CPT | Mod: 26

## 2023-11-29 PROCEDURE — 93308 TTE F-UP OR LMTD: CPT | Mod: 26

## 2023-11-29 RX ORDER — SALIVA SUBSTITUTE COMB NO.11 351 MG
5 POWDER IN PACKET (EA) MUCOUS MEMBRANE THREE TIMES A DAY
Refills: 0 | Status: DISCONTINUED | OUTPATIENT
Start: 2023-11-29 | End: 2023-12-01

## 2023-11-29 RX ADMIN — CEFTRIAXONE 100 MILLIGRAM(S): 500 INJECTION, POWDER, FOR SOLUTION INTRAMUSCULAR; INTRAVENOUS at 20:37

## 2023-11-29 RX ADMIN — PANTOPRAZOLE SODIUM 40 MILLIGRAM(S): 20 TABLET, DELAYED RELEASE ORAL at 18:46

## 2023-11-29 RX ADMIN — Medication 81 MILLIGRAM(S): at 11:38

## 2023-11-29 RX ADMIN — AZITHROMYCIN 250 MILLIGRAM(S): 500 TABLET, FILM COATED ORAL at 19:15

## 2023-11-29 RX ADMIN — Medication 2.5 MILLIGRAM(S): at 23:25

## 2023-11-29 RX ADMIN — Medication 3 MILLILITER(S): at 19:18

## 2023-11-29 RX ADMIN — CLOPIDOGREL BISULFATE 75 MILLIGRAM(S): 75 TABLET, FILM COATED ORAL at 11:38

## 2023-11-29 RX ADMIN — Medication 1 TABLET(S): at 11:38

## 2023-11-29 RX ADMIN — TIOTROPIUM BROMIDE 2 PUFF(S): 18 CAPSULE ORAL; RESPIRATORY (INHALATION) at 11:46

## 2023-11-29 RX ADMIN — SODIUM CHLORIDE 4 MILLILITER(S): 9 INJECTION INTRAMUSCULAR; INTRAVENOUS; SUBCUTANEOUS at 19:18

## 2023-11-29 RX ADMIN — Medication 2.5 MILLIGRAM(S): at 00:09

## 2023-11-29 RX ADMIN — BUDESONIDE AND FORMOTEROL FUMARATE DIHYDRATE 2 PUFF(S): 160; 4.5 AEROSOL RESPIRATORY (INHALATION) at 19:17

## 2023-11-29 RX ADMIN — Medication 40 MILLIGRAM(S): at 19:13

## 2023-11-29 RX ADMIN — Medication 2.5 MILLIGRAM(S): at 18:42

## 2023-11-29 RX ADMIN — Medication 5 MILLILITER(S): at 19:12

## 2023-11-29 RX ADMIN — CHLORHEXIDINE GLUCONATE 1 APPLICATION(S): 213 SOLUTION TOPICAL at 05:29

## 2023-11-29 RX ADMIN — Medication 2.5 MILLIGRAM(S): at 07:40

## 2023-11-29 RX ADMIN — Medication 2.5 MILLIGRAM(S): at 11:39

## 2023-11-29 RX ADMIN — PANTOPRAZOLE SODIUM 40 MILLIGRAM(S): 20 TABLET, DELAYED RELEASE ORAL at 06:36

## 2023-11-29 NOTE — PROGRESS NOTE ADULT - PROBLEM SELECTOR PLAN 6
Chest pain free. s/p diagnostic cath on 11/16. Has  85 % stenosis in distal Lcx. 40 % stenosis in LAD, unchanged from prior  - c/w aspirin, plavix, statin. Palvix resumed 11/26  - cardiology is onboard, appreciate recs On HD M,W,F. AVF fistula on the LUE  Nephrologist: Dr. Sandra Monte  - Nephrology consulted  - c/w midodrine 10mg MWF for dialysis as needed.

## 2023-11-29 NOTE — PROGRESS NOTE ADULT - SUBJECTIVE AND OBJECTIVE BOX
[facility] Division of Hospital Medicine  Demarcus Monahan MD  Available via MS Teams    SUBJECTIVE / OVERNIGHT EVENTS: Yesterday patient went in to stable afib with RVR rates up to 150s, spontaneously went back into sinus rhythm. Rates now controlled. Still with nausea and emesis. Upon clarification with patient's daughter, pt has a long standing history of post-tussive emesis. Pt reports feeling better today, chest discomfort improved after drain removal.      MEDICATIONS  (STANDING):  albuterol/ipratropium for Nebulization 3 milliLiter(s) Nebulizer every 6 hours  aspirin enteric coated 81 milliGRAM(s) Oral daily  atorvastatin 20 milliGRAM(s) Oral at bedtime  azithromycin  IVPB      azithromycin  IVPB 250 milliGRAM(s) IV Intermittent every 24 hours  Biotene Dry Mouth Oral Rinse 5 milliLiter(s) Swish and Spit three times a day  bisacodyl 5 milliGRAM(s) Oral at bedtime  budesonide  80 MICROgram(s)/formoterol 4.5 MICROgram(s) Inhaler 2 Puff(s) Inhalation two times a day  calcium acetate 1334 milliGRAM(s) Oral three times a day with meals  cefTRIAXone   IVPB      cefTRIAXone   IVPB 1000 milliGRAM(s) IV Intermittent every 24 hours  chlorhexidine 2% Cloths 1 Application(s) Topical <User Schedule>  clopidogrel Tablet 75 milliGRAM(s) Oral daily  guaiFENesin Oral Liquid (Sugar-Free) 200 milliGRAM(s) Oral every 6 hours  levothyroxine Injectable 105 MICROGram(s) IV Push at bedtime  methylPREDNISolone sodium succinate Injectable 40 milliGRAM(s) IV Push every 24 hours  metoprolol tartrate Injectable 2.5 milliGRAM(s) IV Push every 6 hours  metoprolol tartrate Injectable 5 milliGRAM(s) IV Push once  Nephro-lenore 1 Tablet(s) Oral daily  pantoprazole  Injectable 40 milliGRAM(s) IV Push two times a day  sodium chloride 3%  Inhalation 4 milliLiter(s) Inhalation every 12 hours  tamsulosin 0.4 milliGRAM(s) Oral at bedtime  tiotropium 2.5 MICROgram(s) Inhaler 2 Puff(s) Inhalation daily    MEDICATIONS  (PRN):  albuterol    90 MICROgram(s) HFA Inhaler 2 Puff(s) Inhalation every 6 hours PRN Shortness of Breath and/or Wheezing  bisacodyl 5 milliGRAM(s) Oral every 12 hours PRN Constipation  melatonin 3 milliGRAM(s) Oral at bedtime PRN Insomnia  midodrine. 10 milliGRAM(s) Oral <User Schedule> PRN Low BP prior to HD  ondansetron Injectable 4 milliGRAM(s) IV Push every 8 hours PRN Nausea and/or Vomiting  oxyCODONE    IR 10 milliGRAM(s) Oral every 6 hours PRN Severe Pain (7 - 10)      I&O's Summary  T(C): 36.6 (11-29-23 @ 14:00), Max: 37 (11-29-23 @ 10:39)  HR: 63 (11-29-23 @ 14:00) (62 - 158)  BP: 158/75 (11-29-23 @ 14:00) (97/62 - 170/73)  RR: 18 (11-29-23 @ 14:00) (18 - 19)  SpO2: 94% (11-29-23 @ 14:00) (94% - 96%)    CONSTITUTIONAL: coughing up thick phlegm   RESPIRATORY: coughing, expiratory wheezing  CARDIOVASCULAR: Regular rate and rhythm, normal S1 and S2, no murmur/rub/gallop; No lower extremity edema  ABDOMEN: Nontender to palpation, normoactive bowel sounds  MUSCULOSKELETAL: no clubbing or cyanosis of digits; no joint swelling or tenderness to palpation  PSYCH: A+O to person, place, and time; affect appropriate  NEUROLOGY: CN 2-12 are intact and symmetric; no gross sensory deficits   SKIN: No rashes; no palpable lesions    LABS:                        10.8   6.42  )-----------( 238      ( 27 Nov 2023 05:38 )             33.3     11-27    141  |  94<L>  |  60<H>  ----------------------------<  89  4.6   |  27  |  9.89<H>    Ca    8.7      27 Nov 2023 05:37  Phos  7.3     11-27  Mg     2.6     11-27            Urinalysis Basic - ( 27 Nov 2023 05:37 )    Color: x / Appearance: x / SG: x / pH: x  Gluc: 89 mg/dL / Ketone: x  / Bili: x / Urobili: x   Blood: x / Protein: x / Nitrite: x   Leuk Esterase: x / RBC: x / WBC x   Sq Epi: x / Non Sq Epi: x / Bacteria: x        Culture - Acid Fast - Body Fluid w/Smear (collected 25 Nov 2023 18:21)  Source: .Body Fluid Other    Culture - Body Fluid with Gram Stain (collected 25 Nov 2023 18:21)  Source: .Body Fluid Other  Gram Stain (26 Nov 2023 01:51):    polymorphonuclear leukocytes seen    No organisms seen    by cytocentrifuge  Preliminary Report (26 Nov 2023 18:55):    No growth    Culture - Fungal, Body Fluid (collected 25 Nov 2023 18:21)  Source: Pericardial Pericardial Fluid  Preliminary Report (26 Nov 2023 11:52):    Testing in progress      SARS-CoV-2: [facility] (26 Nov 2023 12:30)            RADIOLOGY & ADDITIONAL TESTS:  New Results Reviewed Today:   New Imaging Personally Reviewed Today:  New Electrocardiogram Personally Reviewed Today:  Prior or Outpatient Records Reviewed Today:    COMMUNICATION:  Care Discussed with Consultants/Other Providers and Details of Discussion: Discussed with ACP  Discussions with Patient/Family:  PCP Communication: [facility] Division of Hospital Medicine  Demarcus Monahan MD  Available via MS Teams    SUBJECTIVE / OVERNIGHT EVENTS: Yesterday patient went in to stable afib with RVR rates up to 150s, spontaneously went back into sinus rhythm. Rates now controlled. Still with nausea and multiple episodes of emesis. Upon clarification with patient's daughter, pt has a long standing history of post-tussive emesis. Pt reports feeling better today, chest discomfort improved after drain removal.      MEDICATIONS  (STANDING):  albuterol/ipratropium for Nebulization 3 milliLiter(s) Nebulizer every 6 hours  aspirin enteric coated 81 milliGRAM(s) Oral daily  atorvastatin 20 milliGRAM(s) Oral at bedtime  azithromycin  IVPB      azithromycin  IVPB 250 milliGRAM(s) IV Intermittent every 24 hours  Biotene Dry Mouth Oral Rinse 5 milliLiter(s) Swish and Spit three times a day  bisacodyl 5 milliGRAM(s) Oral at bedtime  budesonide  80 MICROgram(s)/formoterol 4.5 MICROgram(s) Inhaler 2 Puff(s) Inhalation two times a day  calcium acetate 1334 milliGRAM(s) Oral three times a day with meals  cefTRIAXone   IVPB      cefTRIAXone   IVPB 1000 milliGRAM(s) IV Intermittent every 24 hours  chlorhexidine 2% Cloths 1 Application(s) Topical <User Schedule>  clopidogrel Tablet 75 milliGRAM(s) Oral daily  guaiFENesin Oral Liquid (Sugar-Free) 200 milliGRAM(s) Oral every 6 hours  levothyroxine Injectable 105 MICROGram(s) IV Push at bedtime  methylPREDNISolone sodium succinate Injectable 40 milliGRAM(s) IV Push every 24 hours  metoprolol tartrate Injectable 2.5 milliGRAM(s) IV Push every 6 hours  metoprolol tartrate Injectable 5 milliGRAM(s) IV Push once  Nephro-lenore 1 Tablet(s) Oral daily  pantoprazole  Injectable 40 milliGRAM(s) IV Push two times a day  sodium chloride 3%  Inhalation 4 milliLiter(s) Inhalation every 12 hours  tamsulosin 0.4 milliGRAM(s) Oral at bedtime  tiotropium 2.5 MICROgram(s) Inhaler 2 Puff(s) Inhalation daily    MEDICATIONS  (PRN):  albuterol    90 MICROgram(s) HFA Inhaler 2 Puff(s) Inhalation every 6 hours PRN Shortness of Breath and/or Wheezing  bisacodyl 5 milliGRAM(s) Oral every 12 hours PRN Constipation  melatonin 3 milliGRAM(s) Oral at bedtime PRN Insomnia  midodrine. 10 milliGRAM(s) Oral <User Schedule> PRN Low BP prior to HD  ondansetron Injectable 4 milliGRAM(s) IV Push every 8 hours PRN Nausea and/or Vomiting  oxyCODONE    IR 10 milliGRAM(s) Oral every 6 hours PRN Severe Pain (7 - 10)      I&O's Summary  T(C): 36.6 (11-29-23 @ 14:00), Max: 37 (11-29-23 @ 10:39)  HR: 63 (11-29-23 @ 14:00) (62 - 158)  BP: 158/75 (11-29-23 @ 14:00) (97/62 - 170/73)  RR: 18 (11-29-23 @ 14:00) (18 - 19)  SpO2: 94% (11-29-23 @ 14:00) (94% - 96%)    CONSTITUTIONAL: coughing up thick phlegm   RESPIRATORY: coughing, expiratory wheezing  CARDIOVASCULAR: Regular rate and rhythm, normal S1 and S2, no murmur/rub/gallop; No lower extremity edema  ABDOMEN: Nontender to palpation, normoactive bowel sounds  MUSCULOSKELETAL: no clubbing or cyanosis of digits; no joint swelling or tenderness to palpation  PSYCH: A+O to person, place, and time; affect appropriate  NEUROLOGY: CN 2-12 are intact and symmetric; no gross sensory deficits   SKIN: No rashes; no palpable lesions    LABS:                        11.0   8.19  )-----------( 334      ( 29 Nov 2023 12:34 )             32.6     11-29    139  |  91<L>  |  64<H>  ----------------------------<  104<H>  5.1   |  31  |  8.78<H>    Ca    8.4      29 Nov 2023 12:34  Phos  8.7     11-29  Mg     2.4     11-29      PT/INR - ( 29 Nov 2023 12:34 )   PT: 12.6 sec;   INR: 1.15 ratio         PTT - ( 28 Nov 2023 17:54 )  PTT:29.2 sec  CARDIAC MARKERS ( 28 Nov 2023 23:06 )  x     / x     / 88 U/L / x     / 3.3 ng/mL  CARDIAC MARKERS ( 28 Nov 2023 17:56 )  x     / x     / 72 U/L / x     / 2.8 ng/mL      Urinalysis Basic - ( 29 Nov 2023 12:34 )    Color: x / Appearance: x / SG: x / pH: x  Gluc: 104 mg/dL / Ketone: x  / Bili: x / Urobili: x   Blood: x / Protein: x / Nitrite: x   Leuk Esterase: x / RBC: x / WBC x   Sq Epi: x / Non Sq Epi: x / Bacteria: x        SARS-CoV-2: [facility] (26 Nov 2023 12:30)      Venous: 11-29-23 @ 11:00 FiO2: -- Oxygen Sat% 70.6      RADIOLOGY & ADDITIONAL TESTS:  New Results Reviewed Today:   New Imaging Personally Reviewed Today:  New Electrocardiogram Personally Reviewed Today:  Prior or Outpatient Records Reviewed Today:    COMMUNICATION:  Care Discussed with Consultants/Other Providers and Details of Discussion: Discussed with ACP  Discussions with Patient/Family:  PCP Communication:

## 2023-11-29 NOTE — PROGRESS NOTE ADULT - PROBLEM SELECTOR PLAN 1
ESRD on HD MWF  - Last HD 11/27 with 0 UF. Pt looks dehydrated and so no UF. Put him back on MWF schedule.   HD today. With 1L UF goal.    Midodrine prior to HD. Pericardial effusion s/p IR drain, hemodynamically stable. Management per cardiology.  - Anemia of renal disease: Hgb stable 9-10, at goal. Can check iron studies to make sure not deficient.  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 On Phoslo 1334mg TID. Can check PTH and vitamin D level.

## 2023-11-29 NOTE — PROVIDER CONTACT NOTE (MEDICATION) - BACKGROUND
Pt admitted for STEMI. PMH of htn, hld, cad. Pericardial drain was put in on 11.24 and removed on 11/28.
Pt admitted for STEMI. PMH of htn, hld, CAD.

## 2023-11-29 NOTE — PROGRESS NOTE ADULT - PROBLEM SELECTOR PLAN 3
New posterior pericardial effusion seen while getting LHC (11/16). Unknown etiology as of yet. Pleural fluid LDH significantly elevated, concerning for exudative fluid  - Hemodynamically stable  - restarted plavix after drain, on aspirin  - s/p pericardial drain placement on 11/24, approximately 800 c of serious fluid was aspirated  - 11/25: [id number] cc of straw-colored fluid from the pericardial drain  - 11/26: about 75 cc in the past 24 hours  - s/p removal of IR drain 11/28, repeat limited TTE to assess effusion in AM  - cardiology onboard Episode of paroxysmal afib, now in sinus rhythm 2/2 irritation from drain removal  - per cards, no need for AC as provoked episode    # Trop elevation  - had elevation in trop 2/2 demand from afib, elevated from ESRD. also cath 11/16 with no new obstructive CAD  - no need to further trend

## 2023-11-29 NOTE — PROGRESS NOTE ADULT - SUBJECTIVE AND OBJECTIVE BOX
[facility] Division of Kidney Diseases & Hypertension  FOLLOW UP NOTE  [phone]--------------------------------------------------------------------------------  Chief Complaint:ST elevation myocardial infarction (STEMI)        24 hour events/subjective:  Pt was seen and examined at the bedside. No acute overnight events. No fresh complaints.   Pt denies SOB/ Constipation/ Diarrhea/ Nausea/ Vomiting/ abdominal pain/ chest pain/ tingling/ numbness.           PAST HISTORY  --------------------------------------------------------------------------------  No significant changes to PMH, PSH, FHx, SHx, unless otherwise noted    ALLERGIES & MEDICATIONS  --------------------------------------------------------------------------------  Allergies    No Known Allergies    Intolerances    acetaminophen (Other (Mild))    Standing Inpatient Medications  albuterol/ipratropium for Nebulization 3 milliLiter(s) Nebulizer every 6 hours  aspirin enteric coated 81 milliGRAM(s) Oral daily  atorvastatin 20 milliGRAM(s) Oral at bedtime  azithromycin  IVPB      azithromycin  IVPB 250 milliGRAM(s) IV Intermittent every 24 hours  Biotene Dry Mouth Oral Rinse 5 milliLiter(s) Swish and Spit three times a day  bisacodyl 5 milliGRAM(s) Oral at bedtime  budesonide  80 MICROgram(s)/formoterol 4.5 MICROgram(s) Inhaler 2 Puff(s) Inhalation two times a day  calcium acetate 1334 milliGRAM(s) Oral three times a day with meals  cefTRIAXone   IVPB      cefTRIAXone   IVPB 1000 milliGRAM(s) IV Intermittent every 24 hours  chlorhexidine 2% Cloths 1 Application(s) Topical <User Schedule>  clopidogrel Tablet 75 milliGRAM(s) Oral daily  guaiFENesin Oral Liquid (Sugar-Free) 200 milliGRAM(s) Oral every 6 hours  levothyroxine Injectable 105 MICROGram(s) IV Push at bedtime  methylPREDNISolone sodium succinate Injectable 40 milliGRAM(s) IV Push every 24 hours  metoprolol tartrate Injectable 2.5 milliGRAM(s) IV Push every 6 hours  metoprolol tartrate Injectable 5 milliGRAM(s) IV Push once  Nephro-lenore 1 Tablet(s) Oral daily  pantoprazole  Injectable 40 milliGRAM(s) IV Push two times a day  sodium chloride 3%  Inhalation 4 milliLiter(s) Inhalation every 12 hours  tamsulosin 0.4 milliGRAM(s) Oral at bedtime  tiotropium 2.5 MICROgram(s) Inhaler 2 Puff(s) Inhalation daily    PRN Inpatient Medications  albuterol    90 MICROgram(s) HFA Inhaler 2 Puff(s) Inhalation every 6 hours PRN  bisacodyl 5 milliGRAM(s) Oral every 12 hours PRN  melatonin 3 milliGRAM(s) Oral at bedtime PRN  midodrine. 10 milliGRAM(s) Oral <User Schedule> PRN  ondansetron Injectable 4 milliGRAM(s) IV Push every 8 hours PRN  oxyCODONE    IR 10 milliGRAM(s) Oral every 6 hours PRN      REVIEW OF SYSTEMS  --------------------------------------------------------------------------------  as above      All other systems were reviewed and are negative, except as noted.    VITALS/PHYSICAL EXAM  --------------------------------------------------------------------------------  T(C): 36.4 (11-29-23 @ 17:10), Max: 37 (11-29-23 @ 10:39)  HR: 63 (11-29-23 @ 17:10) (62 - 67)  BP: 110/75 (11-29-23 @ 17:10) (110/75 - 170/73)  RR: 18 (11-29-23 @ 17:10) (18 - 18)  SpO2: 92% (11-29-23 @ 17:10) (92% - 96%)  Wt(kg): --        11-29-23 @ 07:01  -  11-29-23 @ 18:35  --------------------------------------------------------  IN: 800 mL / OUT: 1800 mL / NET: -1000 mL      Physical Exam:      LABS/STUDIES  --------------------------------------------------------------------------------              11.0   8.19  >-----------<  334      [11-29-23 @ 12:34]              32.6     139  |  91  |  64  ----------------------------<  104      [11-29-23 @ 12:34]  5.1   |  31  |  8.78        Ca     8.4     [11-29-23 @ 12:34]      Mg     2.4     [11-29-23 @ 12:34]      Phos  8.7     [11-29-23 @ 12:34]      PT/INR: PT 12.6 , INR 1.15       [11-29-23 @ 12:34]  PTT: 29.2       [11-28-23 @ 17:54]    CK 88      [11-28-23 @ 23:06]    Creatinine Trend:  SCr 8.78 [11-29 @ 12:34]  SCr 9.89 [11-27 @ 05:37]  SCr 7.56 [11-26 @ 06:44]  SCr 10.64 [11-25 @ 06:14]  SCr 8.94 [11-24 @ 07:33]    Urinalysis - [11-29-23 @ 12:34]      Color  / Appearance  / SG  / pH       Gluc 104 / Ketone   / Bili  / Urobili        Blood  / Protein  / Leuk Est  / Nitrite       RBC  / WBC  / Hyaline  / Gran  / Sq Epi  / Non Sq Epi  / Bacteria       Iron 78, TIBC 189, %sat 41      [11-29-23 @ 12:34]  Ferritin 1451      [11-29-23 @ 12:34]  PTH -- (Ca 8.1)      [11-29-23 @ 12:34]   133  Vitamin D (25OH) 29.2      [11-29-23 @ 12:34]       [facility] Division of Kidney Diseases & Hypertension  FOLLOW UP NOTE  [phone]--------------------------------------------------------------------------------  Chief Complaint:ST elevation myocardial infarction (STEMI)        24 hour events/subjective:  Pt was seen and examined at the bedside. No acute overnight events. No fresh complaints.   Pt denies SOB/ Constipation/ Diarrhea/ Nausea/ Vomiting/ abdominal pain/ chest pain/ tingling/ numbness.           PAST HISTORY  --------------------------------------------------------------------------------  No significant changes to PMH, PSH, FHx, SHx, unless otherwise noted    ALLERGIES & MEDICATIONS  --------------------------------------------------------------------------------  Allergies    No Known Allergies    Intolerances    acetaminophen (Other (Mild))    Standing Inpatient Medications  albuterol/ipratropium for Nebulization 3 milliLiter(s) Nebulizer every 6 hours  aspirin enteric coated 81 milliGRAM(s) Oral daily  atorvastatin 20 milliGRAM(s) Oral at bedtime  azithromycin  IVPB      azithromycin  IVPB 250 milliGRAM(s) IV Intermittent every 24 hours  Biotene Dry Mouth Oral Rinse 5 milliLiter(s) Swish and Spit three times a day  bisacodyl 5 milliGRAM(s) Oral at bedtime  budesonide  80 MICROgram(s)/formoterol 4.5 MICROgram(s) Inhaler 2 Puff(s) Inhalation two times a day  calcium acetate 1334 milliGRAM(s) Oral three times a day with meals  cefTRIAXone   IVPB      cefTRIAXone   IVPB 1000 milliGRAM(s) IV Intermittent every 24 hours  chlorhexidine 2% Cloths 1 Application(s) Topical <User Schedule>  clopidogrel Tablet 75 milliGRAM(s) Oral daily  guaiFENesin Oral Liquid (Sugar-Free) 200 milliGRAM(s) Oral every 6 hours  levothyroxine Injectable 105 MICROGram(s) IV Push at bedtime  methylPREDNISolone sodium succinate Injectable 40 milliGRAM(s) IV Push every 24 hours  metoprolol tartrate Injectable 2.5 milliGRAM(s) IV Push every 6 hours  metoprolol tartrate Injectable 5 milliGRAM(s) IV Push once  Nephro-lenore 1 Tablet(s) Oral daily  pantoprazole  Injectable 40 milliGRAM(s) IV Push two times a day  sodium chloride 3%  Inhalation 4 milliLiter(s) Inhalation every 12 hours  tamsulosin 0.4 milliGRAM(s) Oral at bedtime  tiotropium 2.5 MICROgram(s) Inhaler 2 Puff(s) Inhalation daily    PRN Inpatient Medications  albuterol    90 MICROgram(s) HFA Inhaler 2 Puff(s) Inhalation every 6 hours PRN  bisacodyl 5 milliGRAM(s) Oral every 12 hours PRN  melatonin 3 milliGRAM(s) Oral at bedtime PRN  midodrine. 10 milliGRAM(s) Oral <User Schedule> PRN  ondansetron Injectable 4 milliGRAM(s) IV Push every 8 hours PRN  oxyCODONE    IR 10 milliGRAM(s) Oral every 6 hours PRN      REVIEW OF SYSTEMS  --------------------------------------------------------------------------------  as above      All other systems were reviewed and are negative, except as noted.    VITALS/PHYSICAL EXAM  --------------------------------------------------------------------------------  T(C): 36.4 (11-29-23 @ 17:10), Max: 37 (11-29-23 @ 10:39)  HR: 63 (11-29-23 @ 17:10) (62 - 67)  BP: 110/75 (11-29-23 @ 17:10) (110/75 - 170/73)  RR: 18 (11-29-23 @ 17:10) (18 - 18)  SpO2: 92% (11-29-23 @ 17:10) (92% - 96%)  Wt(kg): --        11-29-23 @ 07:01  -  11-29-23 @ 18:35  --------------------------------------------------------  IN: 800 mL / OUT: 1800 mL / NET: -1000 mL      Physical Exam:      LABS/STUDIES  --------------------------------------------------------------------------------              11.0   8.19  >-----------<  334      [11-29-23 @ 12:34]              32.6     139  |  91  |  64  ----------------------------<  104      [11-29-23 @ 12:34]  5.1   |  31  |  8.78        Ca     8.4     [11-29-23 @ 12:34]      Mg     2.4     [11-29-23 @ 12:34]      Phos  8.7     [11-29-23 @ 12:34]      PT/INR: PT 12.6 , INR 1.15       [11-29-23 @ 12:34]  PTT: 29.2       [11-28-23 @ 17:54]    CK 88      [11-28-23 @ 23:06]    Creatinine Trend:  SCr 8.78 [11-29 @ 12:34]  SCr 9.89 [11-27 @ 05:37]  SCr 7.56 [11-26 @ 06:44]  SCr 10.64 [11-25 @ 06:14]  SCr 8.94 [11-24 @ 07:33]    Urinalysis - [11-29-23 @ 12:34]      Color  / Appearance  / SG  / pH       Gluc 104 / Ketone   / Bili  / Urobili        Blood  / Protein  / Leuk Est  / Nitrite       RBC  / WBC  / Hyaline  / Gran  / Sq Epi  / Non Sq Epi  / Bacteria       Iron 78, TIBC 189, %sat 41      [11-29-23 @ 12:34]  Ferritin 1451      [11-29-23 @ 12:34]  PTH -- (Ca 8.1)      [11-29-23 @ 12:34]   133  Vitamin D (25OH) 29.2      [11-29-23 @ 12:34]       [facility] Division of Kidney Diseases & Hypertension  FOLLOW UP NOTE  [phone]--------------------------------------------------------------------------------  Chief Complaint:ST elevation myocardial infarction (STEMI)        24 hour events/subjective:  Pt was seen and examined at the bedside. No acute overnight events. No fresh complaints.   Pt denies SOB/ Constipation/ Diarrhea/ Nausea/ Vomiting/ abdominal pain/ chest pain/ tingling/ numbness.           PAST HISTORY  --------------------------------------------------------------------------------  No significant changes to PMH, PSH, FHx, SHx, unless otherwise noted    ALLERGIES & MEDICATIONS  --------------------------------------------------------------------------------  Allergies    No Known Allergies    Intolerances    acetaminophen (Other (Mild))    Standing Inpatient Medications  albuterol/ipratropium for Nebulization 3 milliLiter(s) Nebulizer every 6 hours  aspirin enteric coated 81 milliGRAM(s) Oral daily  atorvastatin 20 milliGRAM(s) Oral at bedtime  azithromycin  IVPB      azithromycin  IVPB 250 milliGRAM(s) IV Intermittent every 24 hours  Biotene Dry Mouth Oral Rinse 5 milliLiter(s) Swish and Spit three times a day  bisacodyl 5 milliGRAM(s) Oral at bedtime  budesonide  80 MICROgram(s)/formoterol 4.5 MICROgram(s) Inhaler 2 Puff(s) Inhalation two times a day  calcium acetate 1334 milliGRAM(s) Oral three times a day with meals  cefTRIAXone   IVPB      cefTRIAXone   IVPB 1000 milliGRAM(s) IV Intermittent every 24 hours  chlorhexidine 2% Cloths 1 Application(s) Topical <User Schedule>  clopidogrel Tablet 75 milliGRAM(s) Oral daily  guaiFENesin Oral Liquid (Sugar-Free) 200 milliGRAM(s) Oral every 6 hours  levothyroxine Injectable 105 MICROGram(s) IV Push at bedtime  methylPREDNISolone sodium succinate Injectable 40 milliGRAM(s) IV Push every 24 hours  metoprolol tartrate Injectable 2.5 milliGRAM(s) IV Push every 6 hours  metoprolol tartrate Injectable 5 milliGRAM(s) IV Push once  Nephro-lenore 1 Tablet(s) Oral daily  pantoprazole  Injectable 40 milliGRAM(s) IV Push two times a day  sodium chloride 3%  Inhalation 4 milliLiter(s) Inhalation every 12 hours  tamsulosin 0.4 milliGRAM(s) Oral at bedtime  tiotropium 2.5 MICROgram(s) Inhaler 2 Puff(s) Inhalation daily    PRN Inpatient Medications  albuterol    90 MICROgram(s) HFA Inhaler 2 Puff(s) Inhalation every 6 hours PRN  bisacodyl 5 milliGRAM(s) Oral every 12 hours PRN  melatonin 3 milliGRAM(s) Oral at bedtime PRN  midodrine. 10 milliGRAM(s) Oral <User Schedule> PRN  ondansetron Injectable 4 milliGRAM(s) IV Push every 8 hours PRN  oxyCODONE    IR 10 milliGRAM(s) Oral every 6 hours PRN      REVIEW OF SYSTEMS  --------------------------------------------------------------------------------  as above      All other systems were reviewed and are negative, except as noted.    VITALS/PHYSICAL EXAM  --------------------------------------------------------------------------------  T(C): 36.4 (11-29-23 @ 17:10), Max: 37 (11-29-23 @ 10:39)  HR: 63 (11-29-23 @ 17:10) (62 - 67)  BP: 110/75 (11-29-23 @ 17:10) (110/75 - 170/73)  RR: 18 (11-29-23 @ 17:10) (18 - 18)  SpO2: 92% (11-29-23 @ 17:10) (92% - 96%)  Wt(kg): --        11-29-23 @ 07:01  -  11-29-23 @ 18:35  --------------------------------------------------------  IN: 800 mL / OUT: 1800 mL / NET: -1000 mL      Physical Exam:      LABS/STUDIES  --------------------------------------------------------------------------------              11.0   8.19  >-----------<  334      [11-29-23 @ 12:34]              32.6     139  |  91  |  64  ----------------------------<  104      [11-29-23 @ 12:34]  5.1   |  31  |  8.78        Ca     8.4     [11-29-23 @ 12:34]      Mg     2.4     [11-29-23 @ 12:34]      Phos  8.7     [11-29-23 @ 12:34]      PT/INR: PT 12.6 , INR 1.15       [11-29-23 @ 12:34]  PTT: 29.2       [11-28-23 @ 17:54]    CK 88      [11-28-23 @ 23:06]    Creatinine Trend:  SCr 8.78 [11-29 @ 12:34]  SCr 9.89 [11-27 @ 05:37]  SCr 7.56 [11-26 @ 06:44]  SCr 10.64 [11-25 @ 06:14]  SCr 8.94 [11-24 @ 07:33]    Urinalysis - [11-29-23 @ 12:34]      Color  / Appearance  / SG  / pH       Gluc 104 / Ketone   / Bili  / Urobili        Blood  / Protein  / Leuk Est  / Nitrite       RBC  / WBC  / Hyaline  / Gran  / Sq Epi  / Non Sq Epi  / Bacteria       Iron 78, TIBC 189, %sat 41      [11-29-23 @ 12:34]  Ferritin 1451      [11-29-23 @ 12:34]  PTH -- (Ca 8.1)      [11-29-23 @ 12:34]   133  Vitamin D (25OH) 29.2      [11-29-23 @ 12:34]

## 2023-11-29 NOTE — PROGRESS NOTE ADULT - SUBJECTIVE AND OBJECTIVE BOX
Interventional Radiology Follow-Up Note    This is a 87 year old man with HTN, hypothyroidism, and ESRD on HD via AVF.  Also, hx. of CAD s/p PCI to LAD 8/2023 and with OM lesion not amenable to PCI.  Presented with angina like symptoms and was found to have large pericardial effusion. Coronary angiography showed no new obstructive CAD. S/P pericardial drain placement by IR on 11/24/23 by MD Haynes, straw colored clear fluid, 800cc initially removed.    S: Patient seen and examined @ bedside. c/o that keeps getting woken up, still c/o burning pain near throat, denies chest pain or pain where pericardial drain site was.     Medication:     aspirin enteric coated: (11-29)  azithromycin  IVPB: (11-28)  cefTRIAXone   IVPB: (11-28)  clopidogrel Tablet: (11-29)  heparin   Injectable: (11-28)  metoprolol tartrate Injectable: (11-28)  metoprolol tartrate Injectable: (11-29)    Vitals:   T(F): 98.6, Max: 98.6 (10:39)  HR: 63  BP: 164/65  RR: 18  SpO2: 94%    Physical Exam:  General: Nontoxic, in NAD, A&O x3.  Resp: bilateral breath sounds  Left anterior chest with dressing, clean, dry, intact.          LABS:                          11.2   6.44  )-----------( 283      ( 28 Nov 2023 23:06 )             34.8           PT/INR - ( 28 Nov 2023 17:54 )   PT: 20.2 sec;   INR: 1.96 ratio         PTT - ( 28 Nov 2023 17:54 )  PTT:29.2 sec        Assessment and Plan: This is a 87 year old man with HTN, hypothyroidism, and ESRD on HD via AVF.  Also, hx. of CAD s/p PCI to LAD 8/2023 and with OM lesion not amenable to PCI.  Presented with angina like symptoms and was found to have large pericardial effusion. Coronary angiography showed no new obstructive CAD. S/P pericardial drain placement by IR on 11/24/23 by MD Haynes, straw colored clear fluid, 800cc initially removed. Drain removed yesterday 11/28 without incident, a few hours post removal of pericardial drain, IR called by primary team, patient with new onset afib HR in 140's treated with lopressor and converted to SR. TTE done showing no pericardial effusion. H/H stable. Dressing intact, no hematoma noted.    -IR will sign off  -continue global management per primary team  -seen by cardiology     - Greater than 50% of the encounter was spent counseling and/or coordination of care on drain management and follow up.        Please call IR at extension [phone] with any questions, concerns, or issues regarding above.

## 2023-11-29 NOTE — PROGRESS NOTE ADULT - NSPROGADDITIONALINFOA_GEN_ALL_CORE
Discussed with ACP, Ermelinda Renteria.
Discussed with ACP, Mackenzie Torres.
Discussed with ACP, Ermelinda Renteria.
Discussed with ACP, Barrie Hagan.
Discussed with ACP, Staci Mcintyre.
The necessity of the time spent during the encounter on this date of service was due to:   - Ordering, reviewing, and interpreting labs, testing, and imaging  - Independently obtaining a review of systems and performing a physical exam  - Reviewing prior hospitalization and where necessary, outpatient records  - Reviewing consultant recommendations/communicating with consultants  - Counselling and educating patient and family regarding interpretation of aforementioned items and plan of care    Time-based billing (NON-critical care). Total minutes spent: 60
The necessity of the time spent during the encounter on this date of service was due to:   - Ordering, reviewing, and interpreting labs, testing, and imaging  - Independently obtaining a review of systems and performing a physical exam  - Reviewing prior hospitalization and where necessary, outpatient records  - Reviewing consultant recommendations/communicating with consultants  - Counselling and educating patient and family regarding interpretation of aforementioned items and plan of care    Time-based billing (NON-critical care). Total minutes spent: 55

## 2023-11-29 NOTE — PROGRESS NOTE ADULT - PROBLEM SELECTOR PLAN 1
Increase in sputum production c/w COPD exacerbation. On trelegy at home  - CTX/azithromycin and solumedrol IV 40 mg daily started 11/28, plan for 5 day course  - c/w therapeutic inhaler equivalents while inpatient  - hypertonic saline, froy ATC

## 2023-11-29 NOTE — PROVIDER CONTACT NOTE (MEDICATION) - ASSESSMENT
Pt denies any chest pain this morning, no sob, or any discomfort. VSS. BP: 140/62; SPO2: 95%; HR: 62; Temp: 98 (oral).
Pt expresses anger that I have woke patient up from his sleep. Pt denies cp, sob, or any discomfort. Pt refuses vitals.

## 2023-11-29 NOTE — PROGRESS NOTE ADULT - PROBLEM SELECTOR PLAN 4
Reportedly in setting of excessive coughing  - continue zofran 4mg q8hr PRN for nausea  - obtain CT chest, abdomen and pelvis showed no obstruction New posterior pericardial effusion seen while getting LHC (11/16). Unknown etiology as of yet. Pleural fluid LDH significantly elevated, concerning for exudative fluid  - Hemodynamically stable  - restarted plavix after drain, on aspirin  - s/p pericardial drain placement on 11/24, approximately 800 c of serious fluid was aspirated  - 11/25: [id number] cc of straw-colored fluid from the pericardial drain  - 11/26: about 75 cc in the past 24 hours  - s/p removal of IR drain 11/28, TTE repeated without effusion  - cardiology onboard

## 2023-11-29 NOTE — PROGRESS NOTE ADULT - ASSESSMENT
87 year old man with HTN, hypothyroidism, and ESRD on HD via AVF.  Also, hx. of CAD s/p PCI to LAD 8/2023 and with OM lesion not amenable to PCI.    Presented with angina like symptoms and was found to have large pericardial effusion. Coronary angiography showed no new obstructive CAD.  S/P pericardial drain placement by IR on 11/24/23, straw colored clear fluid, 800cc initially removed  Drain removed by IR 11/28- complicated by afib w/ RVR, resolved    Recommendations:  - c/w ASA, Plavix, atorvastatin 20 mg qd  - drain removed by IR, repeat TTE without pericardial effusion  -- f/u fluid studies, cytology  - no need for AC for atrial fibrillation as it was likely provoked by removal of pericardial drain and has resolved.  -- would restart home metoprolol once able to tolerate PO. If unable to tolerate PO, would recommend prn IV metoprolol 2.5mg    no further evaluation required from cardiac perspective  please arrange for close follow up with patient's cardiologist, will require outpatient TTE  cardiology to sign off, please reach out as needed    Darin Haney MD  PGY-4, Cardiology Fellow    For Cardiology consults and questions, all Cardiology service information can be found 24/7 on [email or web address] - use password: [email or web address] to log in.  87 year old man with HTN, hypothyroidism, and ESRD on HD via AVF.  Also, hx. of CAD s/p PCI to LAD 8/2023 and with OM lesion not amenable to PCI.    Presented with angina like symptoms and was found to have large pericardial effusion. Coronary angiography showed no new obstructive CAD.  S/P pericardial drain placement by IR on 11/24/23, straw colored clear fluid, 800cc initially removed  Drain removed by IR 11/28- complicated by afib w/ RVR, resolved    Recommendations:  - c/w ASA, Plavix, atorvastatin 20 mg qd  - drain removed by IR, repeat TTE without pericardial effusion  - f/u fluid studies, cytology  - no need for AC for atrial fibrillation, as it was likely provoked by removal of pericardial drain; episode has resolved.  - would restart home metoprolol once able to tolerate PO. If unable to tolerate PO, would recommend prn IV metoprolol 2.5mg  - no further evaluation required from cardiac perspective    Please arrange for close follow up with patient's cardiologist, will require outpatient TTE  Cardiology to sign off, please reach out as needed      Darin Haney MD  PGY-4, Cardiology Fellow    Plan discussed with cardiology fellow.  Patient seen and examined.  Hx., exam and labs as above.  I agree with the assessment and recommendations, which I have reviewed and edited where appropriate.  Tyrell Cuevas M.D.  Cardiology Attending, Consult Service    For Cardiology consults and questions, all Cardiology service information can be found 24/7 on [email or web address] - use password: [email or web address] to log in.

## 2023-11-29 NOTE — PROGRESS NOTE ADULT - ASSESSMENT
87 year old male with PMHx of HTN, HLD, CAD s/p diagnostic [facility] 06/2023 showing severe mLAD disease with severe OM not amendable to stenting, prior LAD + proximal OM stent, PCI/SKYLAR of mLAD x1 (Dr. Wesley) - 08/2023, PVD, ESRD - on HD MWF who initially presented for ambulatory pericardial drain. Procedure was cancelled due to hyperkalemia to 6.4, and the patient was admitted for further care.  He is s/p nonurgent dialysis on 11/22, now potassium is normalized. Pt is now s/p IR guided pericardial drain placement, on 11/24. 87 year old male with PMHx of HTN, HLD, CAD s/p diagnostic [facility] 06/2023 showing severe mLAD disease with severe OM not amendable to stenting, prior LAD + proximal OM stent, PCI/SKYLAR of mLAD x1 (Dr. Wesley) - 08/2023, PVD, ESRD - on HD MWF who initially presented for ambulatory pericardial drain. Procedure was cancelled due to hyperkalemia to 6.4, and the patient was admitted for further care.  He is s/p nonurgent dialysis on 11/22, now potassium is normalized. Pt is now s/p IR guided pericardial drain placement, on 11/24, drain removed 11/28.

## 2023-11-29 NOTE — PROGRESS NOTE ADULT - SUBJECTIVE AND OBJECTIVE BOX
Cardiology Progress Note  ------------------------------------------------------------------------------------------  SUBJECTIVE:   - Tele: NSR  - No events overnight. Still with frequent nausea, vomiting. endorses chest and throat pain since yesterday, improving.    VS:  T(F): 98 (11-29), Max: 98 (11-28)  HR: 65 (11-29) (62 - 158)  BP: 170/73 (11-29) (97/62 - 170/73)  RR: 18 (11-29)  SpO2: 95% (11-29)  I&O's Summary    PHYSICAL EXAM:  GENERAL: NAD  HEAD: Atraumatic, Normocephalic.  ENT: Moist mucous membranes.  NECK: No JVD.  CHEST/LUNG: Clear to auscultation, Unlabored respirations.  HEART: Regular rate and rhythm; No murmur  ABDOMEN: Soft, Nontender  EXTREMITIES:  warm., UE AVF  PSYCH: Normal affect.  SKIN: No rashes or lesions.  -------------------------------------------------------------------------------------------  LABS:                          11.2   6.44  )-----------( 283      ( 28 Nov 2023 23:06 )             34.8           PT/INR - ( 28 Nov 2023 17:54 )   PT: 20.2 sec;   INR: 1.96 ratio         PTT - ( 28 Nov 2023 17:54 )  PTT:29.2 sec  CARDIAC MARKERS ( 28 Nov 2023 23:06 )  108 ng/L / x     / x     / 88 U/L / x     / 3.3 ng/mL  CARDIAC MARKERS ( 28 Nov 2023 17:56 )  91 ng/L / x     / x     / 72 U/L / x     / 2.8 ng/mL            -------------------------------------------------------------------------------------------  Meds:  albuterol    90 MICROgram(s) HFA Inhaler 2 Puff(s) Inhalation every 6 hours PRN  albuterol/ipratropium for Nebulization 3 milliLiter(s) Nebulizer every 6 hours  aspirin enteric coated 81 milliGRAM(s) Oral daily  atorvastatin 20 milliGRAM(s) Oral at bedtime  azithromycin  IVPB 250 milliGRAM(s) IV Intermittent every 24 hours  azithromycin  IVPB      bisacodyl 5 milliGRAM(s) Oral at bedtime  bisacodyl 5 milliGRAM(s) Oral every 12 hours PRN  budesonide  80 MICROgram(s)/formoterol 4.5 MICROgram(s) Inhaler 2 Puff(s) Inhalation two times a day  calcium acetate 1334 milliGRAM(s) Oral three times a day with meals  cefTRIAXone   IVPB      cefTRIAXone   IVPB 1000 milliGRAM(s) IV Intermittent every 24 hours  chlorhexidine 2% Cloths 1 Application(s) Topical <User Schedule>  clopidogrel Tablet 75 milliGRAM(s) Oral daily  guaiFENesin Oral Liquid (Sugar-Free) 200 milliGRAM(s) Oral every 6 hours  levothyroxine Injectable 105 MICROGram(s) IV Push at bedtime  melatonin 3 milliGRAM(s) Oral at bedtime PRN  methylPREDNISolone sodium succinate Injectable 40 milliGRAM(s) IV Push every 24 hours  metoprolol tartrate Injectable 5 milliGRAM(s) IV Push once  metoprolol tartrate Injectable 2.5 milliGRAM(s) IV Push every 6 hours  midodrine. 10 milliGRAM(s) Oral <User Schedule> PRN  Nephro-lenore 1 Tablet(s) Oral daily  ondansetron Injectable 4 milliGRAM(s) IV Push every 8 hours PRN  oxyCODONE    IR 10 milliGRAM(s) Oral every 6 hours PRN  pantoprazole  Injectable 40 milliGRAM(s) IV Push two times a day  sodium chloride 3%  Inhalation 4 milliLiter(s) Inhalation every 12 hours  tamsulosin 0.4 milliGRAM(s) Oral at bedtime  tiotropium 2.5 MICROgram(s) Inhaler 2 Puff(s) Inhalation daily     Cardiology Progress Note  ------------------------------------------------------------------------------------------  SUBJECTIVE:   - No events overnight. Still with frequent nausea, vomiting. endorses chest and throat pain since yesterday, improving.    - Tele: NSR      -------------------------------------------------------------------------------------------  Meds:  albuterol    90 MICROgram(s) HFA Inhaler 2 Puff(s) Inhalation every 6 hours PRN  albuterol/ipratropium for Nebulization 3 milliLiter(s) Nebulizer every 6 hours  aspirin enteric coated 81 milliGRAM(s) Oral daily  atorvastatin 20 milliGRAM(s) Oral at bedtime  azithromycin  IVPB 250 milliGRAM(s) IV Intermittent every 24 hours  azithromycin  IVPB      bisacodyl 5 milliGRAM(s) Oral at bedtime  bisacodyl 5 milliGRAM(s) Oral every 12 hours PRN  budesonide  80 MICROgram(s)/formoterol 4.5 MICROgram(s) Inhaler 2 Puff(s) Inhalation two times a day  calcium acetate 1334 milliGRAM(s) Oral three times a day with meals  cefTRIAXone   IVPB      cefTRIAXone   IVPB 1000 milliGRAM(s) IV Intermittent every 24 hours  chlorhexidine 2% Cloths 1 Application(s) Topical <User Schedule>  clopidogrel Tablet 75 milliGRAM(s) Oral daily  guaiFENesin Oral Liquid (Sugar-Free) 200 milliGRAM(s) Oral every 6 hours  levothyroxine Injectable 105 MICROGram(s) IV Push at bedtime  melatonin 3 milliGRAM(s) Oral at bedtime PRN  methylPREDNISolone sodium succinate Injectable 40 milliGRAM(s) IV Push every 24 hours  metoprolol tartrate Injectable 5 milliGRAM(s) IV Push once  metoprolol tartrate Injectable 2.5 milliGRAM(s) IV Push every 6 hours  midodrine. 10 milliGRAM(s) Oral <User Schedule> PRN  Nephro-lenore 1 Tablet(s) Oral daily  ondansetron Injectable 4 milliGRAM(s) IV Push every 8 hours PRN  oxyCODONE    IR 10 milliGRAM(s) Oral every 6 hours PRN  pantoprazole  Injectable 40 milliGRAM(s) IV Push two times a day  sodium chloride 3%  Inhalation 4 milliLiter(s) Inhalation every 12 hours  tamsulosin 0.4 milliGRAM(s) Oral at bedtime  tiotropium 2.5 MICROgram(s) Inhaler 2 Puff(s) Inhalation daily        VS:  T(F): 98 (11-29), Max: 98 (11-28)  HR: 65 (11-29) (62 - 158)  BP: 170/73 (11-29) (97/62 - 170/73)  RR: 18 (11-29)  SpO2: 95% (11-29)      PHYSICAL EXAM:  GENERAL: NAD  HEAD: Atraumatic, Normocephalic.  ENT: Moist mucous membranes.  NECK: No JVD.  CHEST/LUNG: Clear to auscultation, Unlabored respirations.  HEART: Regular rate and rhythm; No murmur  ABDOMEN: Soft, Nontender  EXTREMITIES:  warm., UE AVF  PSYCH: Normal affect.  SKIN: No rashes or lesions.    -------------------------------------------------------------------------------------------  LABS:                     11.0   8.19  )-----------( 334      ( 29 Nov 2023 12:34 )             32.6     11-29  139  |  91<L>  |  64<H>  ----------------------------<  104<H>  5.1   |  31  |  8.78<H>    Ca    8.4      29 Nov 2023 12:34  Phos  8.7     11-29  Mg     2.4     11-29    PT/INR - ( 29 Nov 2023 12:34 )   PT: 12.6 sec;   INR: 1.15 ratio       PTT - ( 28 Nov 2023 17:54 )  PTT:29.2 sec    CARDIAC MARKERS ( 28 Nov 2023 23:06 )  108 ng/L / x     / x     / 88 U/L / x     / 3.3 ng/mL  CARDIAC MARKERS ( 28 Nov 2023 17:56 )  91 ng/L / x     / x     / 72 U/L / x     / 2.8 ng/mL

## 2023-11-29 NOTE — PROVIDER CONTACT NOTE (MEDICATION) - ACTION/TREATMENT ORDERED:
Provider made aware.
Provider made aware. Provider informed to hold all PO medication for last night (22:00) and this morning (6:00). Also hold lopressor 2.5 mg, as per provider- because pt will go to HD today.

## 2023-11-29 NOTE — PROGRESS NOTE ADULT - PROBLEM SELECTOR PLAN 7
Not in exacerbation at present  - c/w froy singhn Chest pain free. s/p diagnostic cath on 11/16. Has  85 % stenosis in distal Lcx. 40 % stenosis in LAD, unchanged from prior  - c/w aspirin, plavix, statin. Palvix resumed 11/26  - cardiology is onboard, appreciate recs

## 2023-11-29 NOTE — PROVIDER CONTACT NOTE (MEDICATION) - SITUATION
Pt currently NPO for vomiting and throat pain-should oral medication be given? Pt refused morning, duoneb, symbicort, sodium chloride inhalation.
Pt refuses all 10 pm medications. Pt educated on the importance of taking medication but pt still refuses.

## 2023-11-29 NOTE — PROGRESS NOTE ADULT - PROBLEM SELECTOR PLAN 5
On HD M,W,F. AVF fistula on the LUE  Nephrologist: Dr. Sandra Monte  - Nephrology consulted  - c/w midodrine 10mg MWF for dialysis as needed. Reportedly in setting of excessive coughing  - continue zofran 4mg q8hr PRN for nausea  - obtain CT chest, abdomen and pelvis showed no obstruction

## 2023-11-29 NOTE — CHART NOTE - NSCHARTNOTEFT_GEN_A_CORE
Pt noted to have an increase in troponins from 91 to 108. Pt c/o CP only with movement which he was also complaining of earlier today (it is suspected to be d/t constant retching); they are currently sleeping comfortably. Pt and vital signs hemodynamically stable. Repeat EKG was done showing NSR w/o any acute findings. Repeat ECHO done earlier in the day showed no pericardial effusion. CXR was also done showing a small L sided pleural effusion, which was seen previously on prior imaging, and no widened mediastinum (per radiologist overnight, awaiting final read). Pt currently not on A/C d/t drop in H/H earlier in the day; though repeat this evening shows improvement at 11.2/34.8. D/t pt w/o current CP and no acute findings on the aforementioned tests, repeat trops ordered for 5 AM. Discussed the above w/ the cardiology fellow who is in agreement with the above plan. Will f/u AM trops and continue to monitor and reassess. C/w BB and telemonitoring. Will endorse to day team in AM, attending to follow.     ICU Vital Signs Last 24 Hrs  T(C): 36.7 (29 Nov 2023 00:02), Max: 36.7 (28 Nov 2023 21:27)  T(F): 98 (29 Nov 2023 00:02), Max: 98 (28 Nov 2023 21:27)  HR: 67 (29 Nov 2023 00:02) (64 - 158)  BP: 144/72 (29 Nov 2023 00:02) (97/62 - 168/75)  BP(mean): --  ABP: --  ABP(mean): --  RR: 18 (29 Nov 2023 00:02) (18 - 19)  SpO2: 95% (29 Nov 2023 00:02) (93% - 96%)    O2 Parameters below as of 29 Nov 2023 00:02  Patient On (Oxygen Delivery Method): nasal cannula, 2L NC  O2 Flow (L/min): 2      Mark Silverman PA-C  Department of Medicine

## 2023-11-29 NOTE — PROGRESS NOTE ADULT - PROBLEM SELECTOR PLAN 2
Placed on NC 11/25, hx of COPD not on home O2  COPD exacerbation  Maintain SaO2 88-92% with supplemental O2  Treat COPD exacerbation as above

## 2023-11-30 ENCOUNTER — TRANSCRIPTION ENCOUNTER (OUTPATIENT)
Age: 88
End: 2023-11-30

## 2023-11-30 LAB
ANION GAP SERPL CALC-SCNC: 20 MMOL/L — HIGH (ref 5–17)
ANION GAP SERPL CALC-SCNC: 20 MMOL/L — HIGH (ref 5–17)
BUN SERPL-MCNC: 49 MG/DL — HIGH (ref 7–23)
BUN SERPL-MCNC: 49 MG/DL — HIGH (ref 7–23)
CALCIUM SERPL-MCNC: 8.7 MG/DL — SIGNIFICANT CHANGE UP (ref 8.4–10.5)
CALCIUM SERPL-MCNC: 8.7 MG/DL — SIGNIFICANT CHANGE UP (ref 8.4–10.5)
CHLORIDE SERPL-SCNC: 91 MMOL/L — LOW (ref 96–108)
CHLORIDE SERPL-SCNC: 91 MMOL/L — LOW (ref 96–108)
CHROM ANALY OVERALL INTERP SPEC-IMP: SIGNIFICANT CHANGE UP
CHROM ANALY OVERALL INTERP SPEC-IMP: SIGNIFICANT CHANGE UP
CO2 SERPL-SCNC: 26 MMOL/L — SIGNIFICANT CHANGE UP (ref 22–31)
CO2 SERPL-SCNC: 26 MMOL/L — SIGNIFICANT CHANGE UP (ref 22–31)
CREAT SERPL-MCNC: 7.26 MG/DL — HIGH (ref 0.5–1.3)
CREAT SERPL-MCNC: 7.26 MG/DL — HIGH (ref 0.5–1.3)
CULTURE RESULTS: SIGNIFICANT CHANGE UP
CULTURE RESULTS: SIGNIFICANT CHANGE UP
EGFR: 7 ML/MIN/1.73M2 — LOW
EGFR: 7 ML/MIN/1.73M2 — LOW
GLUCOSE SERPL-MCNC: 131 MG/DL — HIGH (ref 70–99)
GLUCOSE SERPL-MCNC: 131 MG/DL — HIGH (ref 70–99)
HCT VFR BLD CALC: 35.8 % — LOW (ref 39–50)
HCT VFR BLD CALC: 35.8 % — LOW (ref 39–50)
HGB BLD-MCNC: 11.9 G/DL — LOW (ref 13–17)
HGB BLD-MCNC: 11.9 G/DL — LOW (ref 13–17)
MAGNESIUM SERPL-MCNC: 2.4 MG/DL — SIGNIFICANT CHANGE UP (ref 1.6–2.6)
MAGNESIUM SERPL-MCNC: 2.4 MG/DL — SIGNIFICANT CHANGE UP (ref 1.6–2.6)
MCHC RBC-ENTMCNC: 31.9 PG — SIGNIFICANT CHANGE UP (ref 27–34)
MCHC RBC-ENTMCNC: 31.9 PG — SIGNIFICANT CHANGE UP (ref 27–34)
MCHC RBC-ENTMCNC: 33.2 GM/DL — SIGNIFICANT CHANGE UP (ref 32–36)
MCHC RBC-ENTMCNC: 33.2 GM/DL — SIGNIFICANT CHANGE UP (ref 32–36)
MCV RBC AUTO: 96 FL — SIGNIFICANT CHANGE UP (ref 80–100)
MCV RBC AUTO: 96 FL — SIGNIFICANT CHANGE UP (ref 80–100)
NRBC # BLD: 0 /100 WBCS — SIGNIFICANT CHANGE UP (ref 0–0)
NRBC # BLD: 0 /100 WBCS — SIGNIFICANT CHANGE UP (ref 0–0)
PHOSPHATE SERPL-MCNC: 7 MG/DL — HIGH (ref 2.5–4.5)
PHOSPHATE SERPL-MCNC: 7 MG/DL — HIGH (ref 2.5–4.5)
PLATELET # BLD AUTO: 351 K/UL — SIGNIFICANT CHANGE UP (ref 150–400)
PLATELET # BLD AUTO: 351 K/UL — SIGNIFICANT CHANGE UP (ref 150–400)
POTASSIUM SERPL-MCNC: 4.4 MMOL/L — SIGNIFICANT CHANGE UP (ref 3.5–5.3)
POTASSIUM SERPL-MCNC: 4.4 MMOL/L — SIGNIFICANT CHANGE UP (ref 3.5–5.3)
POTASSIUM SERPL-SCNC: 4.4 MMOL/L — SIGNIFICANT CHANGE UP (ref 3.5–5.3)
POTASSIUM SERPL-SCNC: 4.4 MMOL/L — SIGNIFICANT CHANGE UP (ref 3.5–5.3)
RBC # BLD: 3.73 M/UL — LOW (ref 4.2–5.8)
RBC # BLD: 3.73 M/UL — LOW (ref 4.2–5.8)
RBC # FLD: 14 % — SIGNIFICANT CHANGE UP (ref 10.3–14.5)
RBC # FLD: 14 % — SIGNIFICANT CHANGE UP (ref 10.3–14.5)
SODIUM SERPL-SCNC: 137 MMOL/L — SIGNIFICANT CHANGE UP (ref 135–145)
SODIUM SERPL-SCNC: 137 MMOL/L — SIGNIFICANT CHANGE UP (ref 135–145)
SPECIMEN SOURCE: SIGNIFICANT CHANGE UP
SPECIMEN SOURCE: SIGNIFICANT CHANGE UP
WBC # BLD: 7.68 K/UL — SIGNIFICANT CHANGE UP (ref 3.8–10.5)
WBC # BLD: 7.68 K/UL — SIGNIFICANT CHANGE UP (ref 3.8–10.5)
WBC # FLD AUTO: 7.68 K/UL — SIGNIFICANT CHANGE UP (ref 3.8–10.5)
WBC # FLD AUTO: 7.68 K/UL — SIGNIFICANT CHANGE UP (ref 3.8–10.5)

## 2023-11-30 PROCEDURE — 99232 SBSQ HOSP IP/OBS MODERATE 35: CPT

## 2023-11-30 RX ORDER — DICLOFENAC SODIUM 75 MG/1
1 TABLET, DELAYED RELEASE ORAL
Refills: 0 | DISCHARGE

## 2023-11-30 RX ORDER — METOPROLOL TARTRATE 50 MG
25 TABLET ORAL DAILY
Refills: 0 | Status: DISCONTINUED | OUTPATIENT
Start: 2023-11-30 | End: 2023-12-01

## 2023-11-30 RX ORDER — SODIUM ZIRCONIUM CYCLOSILICATE 10 G/10G
10 POWDER, FOR SUSPENSION ORAL
Refills: 0 | DISCHARGE

## 2023-11-30 RX ADMIN — CHLORHEXIDINE GLUCONATE 1 APPLICATION(S): 213 SOLUTION TOPICAL at 05:30

## 2023-11-30 RX ADMIN — AZITHROMYCIN 250 MILLIGRAM(S): 500 TABLET, FILM COATED ORAL at 19:01

## 2023-11-30 RX ADMIN — Medication 5 MILLILITER(S): at 21:55

## 2023-11-30 RX ADMIN — Medication 105 MICROGRAM(S): at 21:54

## 2023-11-30 RX ADMIN — BUDESONIDE AND FORMOTEROL FUMARATE DIHYDRATE 2 PUFF(S): 160; 4.5 AEROSOL RESPIRATORY (INHALATION) at 19:05

## 2023-11-30 RX ADMIN — PANTOPRAZOLE SODIUM 40 MILLIGRAM(S): 20 TABLET, DELAYED RELEASE ORAL at 18:51

## 2023-11-30 RX ADMIN — Medication 25 MILLIGRAM(S): at 19:03

## 2023-11-30 RX ADMIN — Medication 81 MILLIGRAM(S): at 13:04

## 2023-11-30 RX ADMIN — Medication 2.5 MILLIGRAM(S): at 13:07

## 2023-11-30 RX ADMIN — CLOPIDOGREL BISULFATE 75 MILLIGRAM(S): 75 TABLET, FILM COATED ORAL at 13:04

## 2023-11-30 RX ADMIN — Medication 40 MILLIGRAM(S): at 18:58

## 2023-11-30 RX ADMIN — TIOTROPIUM BROMIDE 2 PUFF(S): 18 CAPSULE ORAL; RESPIRATORY (INHALATION) at 13:10

## 2023-11-30 RX ADMIN — CEFTRIAXONE 100 MILLIGRAM(S): 500 INJECTION, POWDER, FOR SOLUTION INTRAMUSCULAR; INTRAVENOUS at 20:06

## 2023-11-30 RX ADMIN — ONDANSETRON 4 MILLIGRAM(S): 8 TABLET, FILM COATED ORAL at 10:31

## 2023-11-30 NOTE — PROGRESS NOTE ADULT - PROBLEM SELECTOR PLAN 5
Pt's toe is bleeding through the bandage.  Please call her at [phone].   Reportedly in setting of excessive coughing  - continue zofran 4mg q8hr PRN for nausea  - obtain CT chest, abdomen and pelvis showed no obstruction

## 2023-11-30 NOTE — PROGRESS NOTE ADULT - ASSESSMENT
87 year old male with PMHx of HTN, HLD, CAD s/p diagnostic [facility] 06/2023 showing severe mLAD disease with severe OM not amendable to stenting, prior LAD + proximal OM stent, PCI/SKYLAR of mLAD x1 (Dr. Wesley) - 08/2023, PVD, ESRD - on HD MWF who initially presented for ambulatory pericardial drain. Procedure was cancelled due to hyperkalemia to 6.4, and the patient was admitted for further care.  He is s/p nonurgent dialysis on 11/22, now potassium is normalized. Pt is now s/p IR guided pericardial drain placement, on 11/24, drain removed 11/28.

## 2023-11-30 NOTE — PROGRESS NOTE ADULT - SUBJECTIVE AND OBJECTIVE BOX
[facility] Division of Hospital Medicine  Demarcus Monahan MD  Available via MS Teams    SUBJECTIVE / OVERNIGHT EVENTS: No acute events overnight. Pt seen and examined at bedside. States his chest discomfort has improved since yesterday. His coughing has improved and has not had any emesis this morning. Feeling better overall.     Tele: sinus rhythm, afib/SVT 22:31 for about 20 seconds    ADDITIONAL REVIEW OF SYSTEMS:    MEDICATIONS  (STANDING):  albuterol/ipratropium for Nebulization 3 milliLiter(s) Nebulizer every 6 hours  aspirin enteric coated 81 milliGRAM(s) Oral daily  atorvastatin 20 milliGRAM(s) Oral at bedtime  azithromycin  IVPB 250 milliGRAM(s) IV Intermittent every 24 hours  azithromycin  IVPB      Biotene Dry Mouth Oral Rinse 5 milliLiter(s) Swish and Spit three times a day  bisacodyl 5 milliGRAM(s) Oral at bedtime  budesonide  80 MICROgram(s)/formoterol 4.5 MICROgram(s) Inhaler 2 Puff(s) Inhalation two times a day  calcium acetate 1334 milliGRAM(s) Oral three times a day with meals  cefTRIAXone   IVPB      cefTRIAXone   IVPB 1000 milliGRAM(s) IV Intermittent every 24 hours  chlorhexidine 2% Cloths 1 Application(s) Topical <User Schedule>  clopidogrel Tablet 75 milliGRAM(s) Oral daily  guaiFENesin Oral Liquid (Sugar-Free) 200 milliGRAM(s) Oral every 6 hours  levothyroxine Injectable 105 MICROGram(s) IV Push at bedtime  methylPREDNISolone sodium succinate Injectable 40 milliGRAM(s) IV Push every 24 hours  metoprolol tartrate Injectable 2.5 milliGRAM(s) IV Push every 6 hours  metoprolol tartrate Injectable 5 milliGRAM(s) IV Push once  Nephro-lenore 1 Tablet(s) Oral daily  pantoprazole  Injectable 40 milliGRAM(s) IV Push two times a day  sodium chloride 3%  Inhalation 4 milliLiter(s) Inhalation every 12 hours  tamsulosin 0.4 milliGRAM(s) Oral at bedtime  tiotropium 2.5 MICROgram(s) Inhaler 2 Puff(s) Inhalation daily    MEDICATIONS  (PRN):  albuterol    90 MICROgram(s) HFA Inhaler 2 Puff(s) Inhalation every 6 hours PRN Shortness of Breath and/or Wheezing  bisacodyl 5 milliGRAM(s) Oral every 12 hours PRN Constipation  melatonin 3 milliGRAM(s) Oral at bedtime PRN Insomnia  midodrine. 10 milliGRAM(s) Oral <User Schedule> PRN Low BP prior to HD  ondansetron Injectable 4 milliGRAM(s) IV Push every 8 hours PRN Nausea and/or Vomiting  oxyCODONE    IR 10 milliGRAM(s) Oral every 6 hours PRN Severe Pain (7 - 10)      I&O's Summary    29 Nov 2023 07:01  -  30 Nov 2023 07:00  --------------------------------------------------------  IN: 1000 mL / OUT: 1800 mL / NET: -800 mL      T(C): 36.9 (11-30-23 @ 11:08), Max: 36.9 (11-30-23 @ 11:08)  HR: 71 (11-30-23 @ 13:00) (62 - 98)  BP: 153/74 (11-30-23 @ 13:00) (110/75 - 158/75)  RR: 18 (11-30-23 @ 11:08) (18 - 18)  SpO2: 94% (11-30-23 @ 11:08) (92% - 95%)    CONSTITUTIONAL: sitting in chair, comfortable appearing elderly gentleman   RESPIRATORY: CTABL; no wheezing  CARDIOVASCULAR: RRR, s1, s2  ABDOMEN: Nontender to palpation, normoactive bowel sounds  MUSCULOSKELETAL: no clubbing or cyanosis of digits; no joint swelling or tenderness to palpation  PSYCH: A+O to person, place, and time; affect appropriate  NEUROLOGY: CN 2-12 are intact and symmetric; no gross sensory deficits   SKIN: No rashes; no palpable lesions    LABS:                        11.9   7.68  )-----------( 351      ( 30 Nov 2023 11:33 )             35.8     11-30    137  |  91<L>  |  49<H>  ----------------------------<  131<H>  4.4   |  26  |  7.26<H>    Ca    8.7      30 Nov 2023 11:33  Phos  7.0     11-30  Mg     2.4     11-30      PT/INR - ( 29 Nov 2023 12:34 )   PT: 12.6 sec;   INR: 1.15 ratio         PTT - ( 28 Nov 2023 17:54 )  PTT:29.2 sec  CARDIAC MARKERS ( 28 Nov 2023 23:06 )  x     / x     / 88 U/L / x     / 3.3 ng/mL  CARDIAC MARKERS ( 28 Nov 2023 17:56 )  x     / x     / 72 U/L / x     / 2.8 ng/mL      Urinalysis Basic - ( 30 Nov 2023 11:33 )    Color: x / Appearance: x / SG: x / pH: x  Gluc: 131 mg/dL / Ketone: x  / Bili: x / Urobili: x   Blood: x / Protein: x / Nitrite: x   Leuk Esterase: x / RBC: x / WBC x   Sq Epi: x / Non Sq Epi: x / Bacteria: x        Culture - Sputum (collected 29 Nov 2023 13:06)  Source: .Sputum Sputum  Gram Stain (29 Nov 2023 23:28):    Few polymorphonuclear leukocytes per low power field    Moderate Squamous epithelial cells per low power field    Few Gram positive cocci in pairs, chains and clusters per oil power field    Few Gram Positive Rods per oil power field    Few Gram Negative Rods per oil power field    Results consistent with oropharyngeal contamination      SARS-CoV-2: NotDetec (26 Nov 2023 12:30)          RADIOLOGY & ADDITIONAL TESTS:  New Results Reviewed Today:   New Imaging Personally Reviewed Today:  New Electrocardiogram Personally Reviewed Today:  Prior or Outpatient Records Reviewed Today:    COMMUNICATION:  Care Discussed with Consultants/Other Providers and Details of Discussion: Discussed with ACP  Discussions with Patient/Family:  PCP Communication:

## 2023-11-30 NOTE — DISCHARGE NOTE PROVIDER - CARE PROVIDER_API CALL
Nella Barboza  Interventional Cardiology  [address] [place] [id number]  Phone: [phone]  Fax: [phone]  Established Patient  Follow Up Time: 1 week    Scott Wiseman  Critical Care Medicine  [id number] [place] [id number]  Phone: [phone]  Fax: [phone]  Established Patient  Follow Up Time: 1 week

## 2023-11-30 NOTE — PROVIDER CONTACT NOTE (OTHER) - ASSESSMENT
A&Ox4. denies chest pain, palpitation, in bathroom at time episode reported. VSS
Pt denied chest pain. VSS.
Pt A&Ox4. Pt educated on need for medications.
Pt complains of chest pain and throat burning sensation. Pt feels nausea with no vomiting. No sob or any other discomfort. Vital signs: BP: 148/65; HR: 62; SPO2: 95%; temp: 97.9 (oral).
Patient in bed complaining of throat and chest pain
Patient is A&Ox4. Patient denies palpitations, SOB, lightheadedness, increase chest pain.

## 2023-11-30 NOTE — DISCHARGE NOTE PROVIDER - HOSPITAL COURSE
HPI:  86yo Male with PMHx of HTN, HLD, CAD s/p diagnostic LHC 06/2023 showing severe mLAD disease with severe OM not amendable to stenting, prior LAD + proximal OM stent, PCI/SKYLAR of mLAD x1 (Dr. Wesley) - 08/2023, PVD, LVEF 65-70%, ESRD - on HD diagnosed 6-years ago MWF schedule via AVF left elbow w/ last session 11/20/2023 at [facility] and follows with Dr. Sandra Monte, and anemia presenting to the hospital for pericardial drain. Procedure cancelled 2/2 hyperkalemia. States tolerated full 3 hr session of HD yesterday. Of note pt was recently admitted for evaluation of worsening CAD w/ CP, SOB, exertional dyspnea. He underwent a LHC and was found to non obstructive CAD. Additionally he was found to have a predominantly posterior pericardial effusion, confirmed on TTE. After discussion between cardiology and IR, the patient is planned for a pericardial window outpatient Tuesday, 11/21 after holding Plavix for 5-days via interventional radiology    (21 Nov 2023 13:19)    Hospital Course:      Important Medication Changes and Reason:    Active or Pending Issues Requiring Follow-up:    Advanced Directives:   [ ] Full code  [ ] DNR  [ ] Hospice    Discharge Diagnoses:  pericardial effusion      HPI:  86yo Male with PMHx of HTN, HLD, CAD s/p diagnostic LHC 06/2023 showing severe mLAD disease with severe OM not amendable to stenting, prior LAD + proximal OM stent, PCI/SKYLAR of mLAD x1 (Dr. Wesley) - 08/2023, PVD, LVEF 65-70%, ESRD - on HD diagnosed 6-years ago MWF schedule via AVF left elbow w/ last session 11/20/2023 at [facility] and follows with Dr. Sandra Monte, and anemia presenting to the hospital for pericardial drain. Procedure cancelled 2/2 hyperkalemia. States tolerated full 3 hr session of HD yesterday. Of note pt was recently admitted for evaluation of worsening CAD w/ CP, SOB, exertional dyspnea. He underwent a LHC and was found to non obstructive CAD. Additionally he was found to have a predominantly posterior pericardial effusion, confirmed on TTE. After discussion between cardiology and IR, the patient is planned for a pericardial window outpatient Tuesday, 11/21 after holding Plavix for 5-days via interventional radiology    (21 Nov 2023 13:19)    Hospital Course: Pt's course complicated by hyperkalemia, despite not having any missed dialysis, resolved after dialysis. Pt eventually went for drainage of pericardial effusion on 11/24, with 800cc of serous fluid aspirated. Fluid analysis did not clearly determine a cause of effusion (flow cyto only showed neutrophils, macrophages, and small lymphocytes). Drain was removed 11/28, complicated by afib with RVR, which spontaneously resolved. Cardiology did not recommend AC given that paroxysmal afib likely triggered by drain removal. Course complicated by worsening productive coughing, found to be in COPD exacerbation, with post tussive emesis. Pt was treated for COPD exacerbation with improvement in symptoms.     Important Medication Changes and Reason:     Active or Pending Issues Requiring Follow-up: cardiology for monitoring of effusion, pulm for COPD management    Advanced Directives:   [X] Full code  [ ] DNR  [ ] Hospice    Discharge Diagnoses:  pericardial effusion  COPD exacerbation      HPI:  86yo Male with PMHx of HTN, HLD, CAD s/p diagnostic LHC 06/2023 showing severe mLAD disease with severe OM not amendable to stenting, prior LAD + proximal OM stent, PCI/SKYLAR of mLAD x1 (Dr. Wesley) - 08/2023, PVD, LVEF 65-70%, ESRD - on HD diagnosed 6-years ago MWF schedule via AVF left elbow w/ last session 11/20/2023 at [facility] and follows with Dr. Sandra Monte, and anemia presenting to the hospital for pericardial drain. Procedure cancelled 2/2 hyperkalemia. States tolerated full 3 hr session of HD yesterday. Of note pt was recently admitted for evaluation of worsening CAD w/ CP, SOB, exertional dyspnea. He underwent a LHC and was found to non obstructive CAD. Additionally he was found to have a predominantly posterior pericardial effusion, confirmed on TTE. After discussion between cardiology and IR, the patient is planned for a pericardial window outpatient Tuesday, 11/21 after holding Plavix for 5-days via interventional radiology    (21 Nov 2023 13:19)    Hospital Course: Pt's course complicated by hyperkalemia, despite not having any missed dialysis, resolved after dialysis. Pt eventually went for drainage of pericardial effusion on 11/24, with 800cc of serous fluid aspirated. Fluid analysis did not clearly determine a cause of effusion (flow cyto only showed neutrophils, macrophages, and small lymphocytes). Drain was removed 11/28, complicated by afib with RVR, which spontaneously resolved. Cardiology did not recommend AC given that paroxysmal afib likely triggered by drain removal. Course complicated by worsening productive coughing, found to be in COPD exacerbation, with post tussive emesis. Pt was treated for COPD exacerbation with improvement in symptoms.     Important Medication Changes and Reason: levaquin and prednisone to finish 5 day course     Active or Pending Issues Requiring Follow-up: cardiology for monitoring of effusion, pulm for COPD management    Advanced Directives:   [X] Full code  [ ] DNR  [ ] Hospice    Discharge Diagnoses:  pericardial effusion  COPD exacerbation

## 2023-11-30 NOTE — PROVIDER CONTACT NOTE (OTHER) - DATE AND TIME:
29-Nov-2023 09:02
29-Nov-2023 23:15
21-Nov-2023 18:57
28-Nov-2023 18:38
27-Nov-2023 13:52
30-Nov-2023 13:51

## 2023-11-30 NOTE — DISCHARGE NOTE PROVIDER - CARE PROVIDERS DIRECT ADDRESSES
[email or web address]

## 2023-11-30 NOTE — PROVIDER CONTACT NOTE (OTHER) - RECOMMENDATIONS
Monitor
NPO, IV Fluids, Lab draws, EKG, Pain medication
Notify provider.
Notify provider.
BP medications?

## 2023-11-30 NOTE — PROGRESS NOTE ADULT - PROBLEM SELECTOR PLAN 6
On HD M,W,F. AVF fistula on the LUE  Nephrologist: Dr. Sandra Monte  - Nephrology consulted  - c/w midodrine 10mg MWF for dialysis as needed. On HD M,W,F. AVF fistula on the LUE  Nephrologist: Dr. Sandra Monte  - Nephrology consulted  - c/w midodrine 10mg MWF for dialysis as needed.  - phos above goal, f/u nephro recs

## 2023-11-30 NOTE — PROGRESS NOTE ADULT - PROBLEM SELECTOR PLAN 4
New posterior pericardial effusion seen while getting LHC (11/16). Unknown etiology as of yet. Pleural fluid LDH significantly elevated, concerning for exudative fluid  - Hemodynamically stable  - restarted plavix after drain, on aspirin  - s/p pericardial drain placement on 11/24, approximately 800 c of serious fluid was aspirated  - 11/25: [id number] cc of straw-colored fluid from the pericardial drain  - 11/26: about 75 cc in the past 24 hours  - s/p removal of IR drain 11/28, TTE repeated without effusion  - cardiology onboard

## 2023-11-30 NOTE — PROVIDER CONTACT NOTE (OTHER) - SITUATION
/73
Pt refusing PO medications
Telemetry reported episode of PAT for 5.30sec heart rate 139. Refused medications for 1200.
PAT up to 160s for 18 seconds on tele.
Patient with Troponin 91

## 2023-11-30 NOTE — PROGRESS NOTE ADULT - PROBLEM SELECTOR PLAN 9
DVT: HSQ  Diet: renal  Dispo: pending PT - pt refused PT evaluation DVT: IMRPOVE 1  Diet: renal  Dispo: pending PT - pt refused PT evaluation

## 2023-11-30 NOTE — PROGRESS NOTE ADULT - PROBLEM SELECTOR PLAN 3
Episode of paroxysmal afib, now in sinus rhythm 2/2 irritation from drain removal  - per cards, no need for AC as provoked episode    # Trop elevation  - had elevation in trop 2/2 demand from afib, elevated from ESRD. also cath 11/16 with no new obstructive CAD  - no need to further trend

## 2023-11-30 NOTE — DISCHARGE NOTE PROVIDER - NSDCCPCAREPLAN_GEN_ALL_CORE_FT
PRINCIPAL DISCHARGE DIAGNOSIS  Diagnosis: Pericardial effusion  Assessment and Plan of Treatment:       SECONDARY DISCHARGE DIAGNOSES  Diagnosis: ESRD on dialysis  Assessment and Plan of Treatment:     Diagnosis: COPD exacerbation  Assessment and Plan of Treatment:     Diagnosis: Nausea and vomiting  Assessment and Plan of Treatment:      PRINCIPAL DISCHARGE DIAGNOSIS  Diagnosis: Pericardial effusion  Assessment and Plan of Treatment: You had a pericardial effusion, which is fluid around the heart. The fluid was drained. The exact cause of the effusion is unknown. Please follow-up with your cardiologist. If you experience chest pain, shortness of breath, low blood pressure, please return to the ED immediately.      SECONDARY DISCHARGE DIAGNOSES  Diagnosis: ESRD on dialysis  Assessment and Plan of Treatment: Continue dialysis and take your medications as prescribed.    Diagnosis: COPD exacerbation  Assessment and Plan of Treatment: You had a COPD exacerbation with increased sputum production. Please continue your inhalers as prescribed. Please take levaquin and prednisone on 12/2 to complete your course of antibiotics. Follow-up with your pulmonologist for further management of your COPD.    Diagnosis: Nausea and vomiting  Assessment and Plan of Treatment: You have persistent vomiting after episodes of coughing. Follow-up with your pulm doctor for further management of your coughing. You may want to follow-up with a GI doctor to exclude any other cause of persistent vomiting.

## 2023-11-30 NOTE — PROVIDER CONTACT NOTE (OTHER) - ACTION/TREATMENT ORDERED:
Provider made aware. Lopressor 2.5 mg IV push administered to pt. Pt refuses prn oxy for pain or zofran.
Patient made NPO, Labs drawn, EKG performed, Pain medication administered, IV Fluid Bolus given
No intervention at this time.
Provider notified and made aware. Was able to administer the Aspirin and Plavix.
SR on tele monitor. ACP made aware.
Will continue to monitor on telemetry.

## 2023-11-30 NOTE — DISCHARGE NOTE PROVIDER - PROVIDER TOKENS
PROVIDER:[TOKEN:[[id number]],FOLLOWUP:[1 week],ESTABLISHEDPATIENT:[T]],PROVIDER:[TOKEN:[[id number]],FOLLOWUP:[1 week],ESTABLISHEDPATIENT:[T]]

## 2023-11-30 NOTE — PROVIDER CONTACT NOTE (OTHER) - REASON
/73
Pt refusing PO medications
Episode of PAT
1834 tele monitor showed 9 beats of wide complex and 1843 12 beats of wide complex
Troponin 91
PAT 160s for 18 seconds on tele

## 2023-11-30 NOTE — PROVIDER CONTACT NOTE (OTHER) - BACKGROUND
Admitting Dx: ST elevation myocardial infarction (STEMI)
Patient Admitted for STEMI
Pt is asymptomatic
87y M with Pericardial Effusion s/p Drain removal
Admitted for Pericardial drain, hyperkalemia
Pt admitted for STEMI. PMH of htn, hld, cad. Pericardial drain was inserted on 11/24 and removed on 11/28.

## 2023-11-30 NOTE — DISCHARGE NOTE PROVIDER - NSDCMRMEDTOKEN_GEN_ALL_CORE_FT
albuterol 90 mcg/inh inhalation aerosol: 2 puff(s) inhaled every 6 hours As needed Shortness of Breath and/or Wheezing  aspirin 81 mg oral delayed release tablet: 1 tablet with sensor orally once a day MDD: 1  diclofenac 18 mg oral capsule: 1 cap(s) orally once a day  DuoNeb 0.5 mg-2.5 mg/3 mL inhalation solution: 1 dose(s) by nebulizer every 4 hours as needed for  shortness of breath and/or wheezing  Lipitor 20 mg oral tablet: 1 tab(s) orally once a day (at bedtime)  Lokelma 10 g oral powder for reconstitution: 10 gram(s) orally Saturday and Sunday  midodrine 10 mg oral tablet: 2 tab(s) orally Monday, Wednesday, and Friday as needed for prior to  dialysis  PhosLo 667 mg oral tablet: 3 tab(s) orally 3 times a day  Plavix 75 mg oral tablet: 1 tab(s) orally once a day  Herlinda-Buddy oral tablet: 1 tab(s) orally  senna (sennosides) 8.6 mg oral tablet: 1 tab(s) orally once a day (at bedtime) as needed for  constipation  Synthroid 137 mcg (0.137 mg) oral tablet: 1 tab(s) orally once a day  tamsulosin 0.4 mg oral capsule: 1 cap(s) orally once a day (at bedtime)  Toprol-XL 25 mg oral tablet, extended release: 1 tab(s) orally once a day  Trelegy Ellipta 100 mcg-62.5 mcg-25 mcg/inh inhalation powder: 1 puff(s) inhaled once a day   albuterol 90 mcg/inh inhalation aerosol: 2 puff(s) inhaled every 6 hours As needed Shortness of Breath and/or Wheezing  aspirin 81 mg oral delayed release tablet: 1 tablet with sensor orally once a day MDD: 1  DuoNeb 0.5 mg-2.5 mg/3 mL inhalation solution: 1 dose(s) by nebulizer every 4 hours as needed for  shortness of breath and/or wheezing  Lipitor 20 mg oral tablet: 1 tab(s) orally once a day (at bedtime)  midodrine 10 mg oral tablet: 2 tab(s) orally Monday, Wednesday, and Friday as needed for prior to  dialysis  PhosLo 667 mg oral tablet: 3 tab(s) orally 3 times a day  Plavix 75 mg oral tablet: 1 tab(s) orally once a day  Herlinda-Buddy oral tablet: 1 tab(s) orally  senna (sennosides) 8.6 mg oral tablet: 1 tab(s) orally once a day (at bedtime) as needed for  constipation  Synthroid 137 mcg (0.137 mg) oral tablet: 1 tab(s) orally once a day  tamsulosin 0.4 mg oral capsule: 1 cap(s) orally once a day (at bedtime)  Toprol-XL 25 mg oral tablet, extended release: 1 tab(s) orally once a day  Trelegy Ellipta 100 mcg-62.5 mcg-25 mcg/inh inhalation powder: 1 puff(s) inhaled once a day   albuterol 90 mcg/inh inhalation aerosol: 2 puff(s) inhaled every 6 hours As needed Shortness of Breath and/or Wheezing  aspirin 81 mg oral delayed release tablet: 1 tablet with sensor orally once a day MDD: 1  DuoNeb 0.5 mg-2.5 mg/3 mL inhalation solution: 1 dose(s) by nebulizer every 4 hours as needed for  shortness of breath and/or wheezing  levoFLOXacin 500 mg oral tablet: 1 tab(s) orally once  Lipitor 20 mg oral tablet: 1 tab(s) orally once a day (at bedtime)  midodrine 10 mg oral tablet: 2 tab(s) orally Monday, Wednesday, and Friday as needed for prior to  dialysis  PhosLo 667 mg oral tablet: 3 tab(s) orally 3 times a day  Plavix 75 mg oral tablet: 1 tab(s) orally once a day  predniSONE 20 mg oral tablet: 2 tab(s) orally once a day  Herlinda-Buddy oral tablet: 1 tab(s) orally  senna (sennosides) 8.6 mg oral tablet: 1 tab(s) orally once a day (at bedtime) as needed for  constipation  Synthroid 137 mcg (0.137 mg) oral tablet: 1 tab(s) orally once a day  tamsulosin 0.4 mg oral capsule: 1 cap(s) orally once a day (at bedtime)  Toprol-XL 25 mg oral tablet, extended release: 1 tab(s) orally once a day  Trelegy Ellipta 100 mcg-62.5 mcg-25 mcg/inh inhalation powder: 1 puff(s) inhaled once a day   albuterol 90 mcg/inh inhalation aerosol: 2 puff(s) inhaled every 6 hours As needed Shortness of Breath and/or Wheezing  aspirin 81 mg oral delayed release tablet: 1 tablet with sensor orally once a day MDD: 1  DuoNeb 0.5 mg-2.5 mg/3 mL inhalation solution: 1 dose(s) by nebulizer every 4 hours as needed for  shortness of breath and/or wheezing  levoFLOXacin 500 mg oral tablet: 1 tab(s) orally once  Lipitor 20 mg oral tablet: 1 tab(s) orally once a day (at bedtime)  midodrine 10 mg oral tablet: 2 tab(s) orally Monday, Wednesday, and Friday as needed for prior to  dialysis  PhosLo 667 mg oral tablet: 3 tab(s) orally 3 times a day  Plavix 75 mg oral tablet: 1 tab(s) orally once a day  predniSONE 20 mg oral tablet: 2 tab(s) orally once a day  Herlinda-Buddy oral tablet: 1 tab(s) orally  senna (sennosides) 8.6 mg oral tablet: 1 tab(s) orally once a day (at bedtime) as needed for  constipation  sodium zirconium cyclosilicate 10 g oral powder for reconstitution: 10 gram(s) orally once a day on Saturday and Sunday  Synthroid 137 mcg (0.137 mg) oral tablet: 1 tab(s) orally once a day  tamsulosin 0.4 mg oral capsule: 1 cap(s) orally once a day (at bedtime)  Toprol-XL 25 mg oral tablet, extended release: 1 tab(s) orally once a day  Trelegy Ellipta 100 mcg-62.5 mcg-25 mcg/inh inhalation powder: 1 puff(s) inhaled once a day

## 2023-12-01 ENCOUNTER — NON-APPOINTMENT (OUTPATIENT)
Age: 88
End: 2023-12-01

## 2023-12-01 ENCOUNTER — TRANSCRIPTION ENCOUNTER (OUTPATIENT)
Age: 88
End: 2023-12-01

## 2023-12-01 VITALS
SYSTOLIC BLOOD PRESSURE: 139 MMHG | DIASTOLIC BLOOD PRESSURE: 67 MMHG | OXYGEN SATURATION: 100 % | HEART RATE: 78 BPM | WEIGHT: 167.99 LBS | RESPIRATION RATE: 18 BRPM | TEMPERATURE: 97 F

## 2023-12-01 LAB
ANION GAP SERPL CALC-SCNC: 18 MMOL/L — HIGH (ref 5–17)
ANION GAP SERPL CALC-SCNC: 18 MMOL/L — HIGH (ref 5–17)
BUN SERPL-MCNC: 73 MG/DL — HIGH (ref 7–23)
BUN SERPL-MCNC: 73 MG/DL — HIGH (ref 7–23)
CALCIUM SERPL-MCNC: 8 MG/DL — LOW (ref 8.4–10.5)
CALCIUM SERPL-MCNC: 8 MG/DL — LOW (ref 8.4–10.5)
CHLORIDE SERPL-SCNC: 94 MMOL/L — LOW (ref 96–108)
CHLORIDE SERPL-SCNC: 94 MMOL/L — LOW (ref 96–108)
CO2 SERPL-SCNC: 26 MMOL/L — SIGNIFICANT CHANGE UP (ref 22–31)
CO2 SERPL-SCNC: 26 MMOL/L — SIGNIFICANT CHANGE UP (ref 22–31)
CREAT SERPL-MCNC: 9.32 MG/DL — HIGH (ref 0.5–1.3)
CREAT SERPL-MCNC: 9.32 MG/DL — HIGH (ref 0.5–1.3)
CULTURE RESULTS: ABNORMAL
CULTURE RESULTS: ABNORMAL
EGFR: 5 ML/MIN/1.73M2 — LOW
EGFR: 5 ML/MIN/1.73M2 — LOW
GLUCOSE SERPL-MCNC: 140 MG/DL — HIGH (ref 70–99)
GLUCOSE SERPL-MCNC: 140 MG/DL — HIGH (ref 70–99)
HCT VFR BLD CALC: 34.5 % — LOW (ref 39–50)
HCT VFR BLD CALC: 34.5 % — LOW (ref 39–50)
HGB BLD-MCNC: 11.4 G/DL — LOW (ref 13–17)
HGB BLD-MCNC: 11.4 G/DL — LOW (ref 13–17)
MAGNESIUM SERPL-MCNC: 2.7 MG/DL — HIGH (ref 1.6–2.6)
MAGNESIUM SERPL-MCNC: 2.7 MG/DL — HIGH (ref 1.6–2.6)
MCHC RBC-ENTMCNC: 31.2 PG — SIGNIFICANT CHANGE UP (ref 27–34)
MCHC RBC-ENTMCNC: 31.2 PG — SIGNIFICANT CHANGE UP (ref 27–34)
MCHC RBC-ENTMCNC: 33 GM/DL — SIGNIFICANT CHANGE UP (ref 32–36)
MCHC RBC-ENTMCNC: 33 GM/DL — SIGNIFICANT CHANGE UP (ref 32–36)
MCV RBC AUTO: 94.5 FL — SIGNIFICANT CHANGE UP (ref 80–100)
MCV RBC AUTO: 94.5 FL — SIGNIFICANT CHANGE UP (ref 80–100)
NRBC # BLD: 0 /100 WBCS — SIGNIFICANT CHANGE UP (ref 0–0)
NRBC # BLD: 0 /100 WBCS — SIGNIFICANT CHANGE UP (ref 0–0)
PHOSPHATE SERPL-MCNC: 7.3 MG/DL — HIGH (ref 2.5–4.5)
PHOSPHATE SERPL-MCNC: 7.3 MG/DL — HIGH (ref 2.5–4.5)
PLATELET # BLD AUTO: 383 K/UL — SIGNIFICANT CHANGE UP (ref 150–400)
PLATELET # BLD AUTO: 383 K/UL — SIGNIFICANT CHANGE UP (ref 150–400)
POTASSIUM SERPL-MCNC: 4.2 MMOL/L — SIGNIFICANT CHANGE UP (ref 3.5–5.3)
POTASSIUM SERPL-MCNC: 4.2 MMOL/L — SIGNIFICANT CHANGE UP (ref 3.5–5.3)
POTASSIUM SERPL-SCNC: 4.2 MMOL/L — SIGNIFICANT CHANGE UP (ref 3.5–5.3)
POTASSIUM SERPL-SCNC: 4.2 MMOL/L — SIGNIFICANT CHANGE UP (ref 3.5–5.3)
RBC # BLD: 3.65 M/UL — LOW (ref 4.2–5.8)
RBC # BLD: 3.65 M/UL — LOW (ref 4.2–5.8)
RBC # FLD: 14 % — SIGNIFICANT CHANGE UP (ref 10.3–14.5)
RBC # FLD: 14 % — SIGNIFICANT CHANGE UP (ref 10.3–14.5)
SODIUM SERPL-SCNC: 138 MMOL/L — SIGNIFICANT CHANGE UP (ref 135–145)
SODIUM SERPL-SCNC: 138 MMOL/L — SIGNIFICANT CHANGE UP (ref 135–145)
SPECIMEN SOURCE: SIGNIFICANT CHANGE UP
SPECIMEN SOURCE: SIGNIFICANT CHANGE UP
WBC # BLD: 9.25 K/UL — SIGNIFICANT CHANGE UP (ref 3.8–10.5)
WBC # BLD: 9.25 K/UL — SIGNIFICANT CHANGE UP (ref 3.8–10.5)
WBC # FLD AUTO: 9.25 K/UL — SIGNIFICANT CHANGE UP (ref 3.8–10.5)
WBC # FLD AUTO: 9.25 K/UL — SIGNIFICANT CHANGE UP (ref 3.8–10.5)

## 2023-12-01 PROCEDURE — 90935 HEMODIALYSIS ONE EVALUATION: CPT | Mod: GC

## 2023-12-01 PROCEDURE — 99239 HOSP IP/OBS DSCHRG MGMT >30: CPT

## 2023-12-01 RX ORDER — SODIUM ZIRCONIUM CYCLOSILICATE 10 G/10G
10 POWDER, FOR SUSPENSION ORAL
Qty: 0 | Refills: 0 | DISCHARGE
Start: 2023-12-01

## 2023-12-01 RX ADMIN — Medication 40 MILLIGRAM(S): at 18:20

## 2023-12-01 RX ADMIN — CEFTRIAXONE 100 MILLIGRAM(S): 500 INJECTION, POWDER, FOR SOLUTION INTRAMUSCULAR; INTRAVENOUS at 18:23

## 2023-12-01 RX ADMIN — Medication 5 MILLILITER(S): at 06:32

## 2023-12-01 RX ADMIN — CLOPIDOGREL BISULFATE 75 MILLIGRAM(S): 75 TABLET, FILM COATED ORAL at 12:15

## 2023-12-01 RX ADMIN — Medication 1334 MILLIGRAM(S): at 17:14

## 2023-12-01 RX ADMIN — TIOTROPIUM BROMIDE 2 PUFF(S): 18 CAPSULE ORAL; RESPIRATORY (INHALATION) at 12:15

## 2023-12-01 RX ADMIN — Medication 81 MILLIGRAM(S): at 12:16

## 2023-12-01 RX ADMIN — AZITHROMYCIN 250 MILLIGRAM(S): 500 TABLET, FILM COATED ORAL at 17:11

## 2023-12-01 RX ADMIN — PANTOPRAZOLE SODIUM 40 MILLIGRAM(S): 20 TABLET, DELAYED RELEASE ORAL at 06:32

## 2023-12-01 RX ADMIN — BUDESONIDE AND FORMOTEROL FUMARATE DIHYDRATE 2 PUFF(S): 160; 4.5 AEROSOL RESPIRATORY (INHALATION) at 17:13

## 2023-12-01 RX ADMIN — PANTOPRAZOLE SODIUM 40 MILLIGRAM(S): 20 TABLET, DELAYED RELEASE ORAL at 18:22

## 2023-12-01 NOTE — PROGRESS NOTE ADULT - PROBLEM SELECTOR PLAN 1
ESRD on HD MWF  - Last HD 11/29     HD today. With 1L UF goal.    Midodrine prior to HD. Pericardial effusion s/p IR drain, hemodynamically stable. Management per cardiology.  - Anemia of renal disease: Hgb stable 9-10, at goal. Can check iron studies to make sure not deficient.  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 On Phoslo 1334mg TID. Can check PTH and vitamin D level.

## 2023-12-01 NOTE — PROGRESS NOTE ADULT - SUBJECTIVE AND OBJECTIVE BOX
[facility] Division of Kidney Diseases & Hypertension  FOLLOW UP NOTE  [phone]--------------------------------------------------------------------------------  Chief Complaint:ST elevation myocardial infarction (STEMI)        24 hour events/subjective:  Pt was seen and examined at the bedside. No acute overnight events. No fresh complaints.   Pt denies SOB/ Constipation/ Diarrhea/ Nausea/ Vomiting/ abdominal pain/ chest pain/ tingling/ numbness.         PAST HISTORY  --------------------------------------------------------------------------------  No significant changes to PMH, PSH, FHx, SHx, unless otherwise noted    ALLERGIES & MEDICATIONS  --------------------------------------------------------------------------------  Allergies    No Known Allergies    Intolerances    acetaminophen (Other (Mild))    Standing Inpatient Medications  albuterol/ipratropium for Nebulization 3 milliLiter(s) Nebulizer every 6 hours  aspirin enteric coated 81 milliGRAM(s) Oral daily  atorvastatin 20 milliGRAM(s) Oral at bedtime  azithromycin  IVPB 250 milliGRAM(s) IV Intermittent every 24 hours  azithromycin  IVPB      Biotene Dry Mouth Oral Rinse 5 milliLiter(s) Swish and Spit three times a day  bisacodyl 5 milliGRAM(s) Oral at bedtime  budesonide  80 MICROgram(s)/formoterol 4.5 MICROgram(s) Inhaler 2 Puff(s) Inhalation two times a day  calcium acetate 1334 milliGRAM(s) Oral three times a day with meals  cefTRIAXone   IVPB      cefTRIAXone   IVPB 1000 milliGRAM(s) IV Intermittent every 24 hours  chlorhexidine 2% Cloths 1 Application(s) Topical <User Schedule>  clopidogrel Tablet 75 milliGRAM(s) Oral daily  levothyroxine Injectable 105 MICROGram(s) IV Push at bedtime  methylPREDNISolone sodium succinate Injectable 40 milliGRAM(s) IV Push every 24 hours  metoprolol succinate ER 25 milliGRAM(s) Oral daily  Nephro-lenore 1 Tablet(s) Oral daily  pantoprazole  Injectable 40 milliGRAM(s) IV Push two times a day  tamsulosin 0.4 milliGRAM(s) Oral at bedtime  tiotropium 2.5 MICROgram(s) Inhaler 2 Puff(s) Inhalation daily    PRN Inpatient Medications  albuterol    90 MICROgram(s) HFA Inhaler 2 Puff(s) Inhalation every 6 hours PRN  bisacodyl 5 milliGRAM(s) Oral every 12 hours PRN  melatonin 3 milliGRAM(s) Oral at bedtime PRN  midodrine. 10 milliGRAM(s) Oral <User Schedule> PRN  ondansetron Injectable 4 milliGRAM(s) IV Push every 8 hours PRN  oxyCODONE    IR 10 milliGRAM(s) Oral every 6 hours PRN      REVIEW OF SYSTEMS  --------------------------------------------------------------------------------  Per above.       VITALS/PHYSICAL EXAM  --------------------------------------------------------------------------------  T(C): 36.6 (12-01-23 @ 11:03), Max: 36.8 (11-30-23 @ 20:45)  HR: 68 (12-01-23 @ 11:03) (60 - 71)  BP: 139/65 (12-01-23 @ 11:03) (124/67 - 153/74)  RR: 18 (12-01-23 @ 11:03) (18 - 18)  SpO2: 94% (12-01-23 @ 11:03) (93% - 94%)  Wt(kg): --        11-30-23 @ 07:01  -  12-01-23 @ 07:00  --------------------------------------------------------  IN: 240 mL / OUT: 0 mL / NET: 240 mL      Physical Exam:  General: no acute distress  Neuro: no focal deficits  HEENT: NC/AT, anicteric  Pulmonary: lungs CTA B/L, Rt chest tube +  Cardiovascular/Chest: +S1S2, RRR  GI/Abdomen: soft, NT/ND, +bowel sounds  Extremities: No edema  Skin: Warm and dry  Vascular access: LUE AVF with palpable thrill    LABS/STUDIES  --------------------------------------------------------------------------------              11.9   7.68  >-----------<  351      [11-30-23 @ 11:33]              35.8     137  |  91  |  49  ----------------------------<  131      [11-30-23 @ 11:33]  4.4   |  26  |  7.26        Ca     8.7     [11-30-23 @ 11:33]      Mg     2.4     [11-30-23 @ 11:33]      Phos  7.0     [11-30-23 @ 11:33]      PT/INR: PT 12.6 , INR 1.15       [11-29-23 @ 12:34]      Creatinine Trend:  SCr 7.26 [11-30 @ 11:33]  SCr 8.78 [11-29 @ 12:34]  SCr 9.89 [11-27 @ 05:37]  SCr 7.56 [11-26 @ 06:44]  SCr 10.64 [11-25 @ 06:14]    Urinalysis - [11-30-23 @ 11:33]      Color  / Appearance  / SG  / pH       Gluc 131 / Ketone   / Bili  / Urobili        Blood  / Protein  / Leuk Est  / Nitrite       RBC  / WBC  / Hyaline  / Gran  / Sq Epi  / Non Sq Epi  / Bacteria       Iron 78, TIBC 189, %sat 41      [11-29-23 @ 12:34]  Ferritin 1451      [11-29-23 @ 12:34]  PTH -- (Ca 8.1)      [11-29-23 @ 12:34]   133  Vitamin D (25OH) 29.2      [11-29-23 @ 12:34]

## 2023-12-01 NOTE — PROGRESS NOTE ADULT - ATTENDING COMMENTS
# ESRD on HD - MWF. Initially planned for dialysis on 11/24/2023 - but he was too tired after IR procedure.  We will dialyze him today.     # Hyperkalemia - resolved.       The rest of the recommendations as per fellow's note.
86 y/o man with HTN, ESRD on HD, CAD s/p PCI to LAD 8/2023 with residual OM disease admitted with large pericardial effusion  --S/p pericardial drain placement 11/24/23--still some straw-colored discharge noted.  --Patient appears relatively comfortable.    --Monitor drain output for timing of removal; follow-up cytology.  --HD per Renal.  --Otherwise continue current cardiac regimen.  --Monitor hemodynamics and recheck limited TTE.
Developed nausea  Hasn't eaten really since Saturday    Spoke w daughter  Dehydrated - HD will not remove fluid today
Kidney failure on HD MWF  - Last HD 11/29     HD today.   1L UF goal.    Midodrine prior to HD  Nausea improving
Pt with ESRD on HD TIW MWF. Pt still has some residual renal function.  Agree w fellow's note as amended
# ESRD on HD - MWF. HD today as planned.     # Hyperkalemia - resolved.       The rest of the recommendations as per fellow's note.    Freida Dutta MD  Attending Nephrologist  [phone] or via [employer]

## 2023-12-01 NOTE — DISCHARGE NOTE NURSING/CASE MANAGEMENT/SOCIAL WORK - NSDCPEFALRISK_GEN_ALL_CORE
For information on Fall & Injury Prevention, visit: [email or web address] OR  [email or web address] OR  https://www.cdc.gov/steadi/patient.html

## 2023-12-01 NOTE — PROGRESS NOTE ADULT - CONVERSATION DETAILS
I explained role of health care proxy as surrogate decision maker in case he is unable to verbalize or make his own decisions. Pt wants to designate his daughters as his HCP. Discussed with Kylecolleen as well. Pt appoints Mira as his HCP with his other daughter Cathy as the alternate. HCP form filled out and placed in chart.

## 2023-12-01 NOTE — PROGRESS NOTE ADULT - PROBLEM SELECTOR PLAN 4
New posterior pericardial effusion seen while getting LHC (11/16). Unknown etiology as of yet. Pleural fluid LDH significantly elevated, concerning for exudative fluid  - Hemodynamically stable  - restarted plavix after drain, on aspirin  - s/p pericardial drain placement on 11/24, approximately 800 c of serious fluid was aspirated  - 11/25: [id number] cc of straw-colored fluid from the pericardial drain  - 11/26: about 75 cc in the past 24 hours  - s/p removal of IR drain 11/28, TTE repeated without effusion  - cardiology onboard

## 2023-12-01 NOTE — PROGRESS NOTE ADULT - SUBJECTIVE AND OBJECTIVE BOX
[facility] Division of Hospital Medicine  Demarcus Monahan MD  Available via MS Teams    SUBJECTIVE / OVERNIGHT EVENTS: No acute events overnight. Pt seen and examined at bedside. Sinus rhythm/sinus shantel overnight. Denies any chest pain or sob. Had some nausea this morning and episode of emesis, but tolerated PO yesterday.     ADDITIONAL REVIEW OF SYSTEMS:    MEDICATIONS  (STANDING):  albuterol/ipratropium for Nebulization 3 milliLiter(s) Nebulizer every 6 hours  aspirin enteric coated 81 milliGRAM(s) Oral daily  atorvastatin 20 milliGRAM(s) Oral at bedtime  azithromycin  IVPB      azithromycin  IVPB 250 milliGRAM(s) IV Intermittent every 24 hours  Biotene Dry Mouth Oral Rinse 5 milliLiter(s) Swish and Spit three times a day  bisacodyl 5 milliGRAM(s) Oral at bedtime  budesonide  80 MICROgram(s)/formoterol 4.5 MICROgram(s) Inhaler 2 Puff(s) Inhalation two times a day  calcium acetate 1334 milliGRAM(s) Oral three times a day with meals  cefTRIAXone   IVPB      cefTRIAXone   IVPB 1000 milliGRAM(s) IV Intermittent every 24 hours  chlorhexidine 2% Cloths 1 Application(s) Topical <User Schedule>  clopidogrel Tablet 75 milliGRAM(s) Oral daily  levothyroxine Injectable 105 MICROGram(s) IV Push at bedtime  methylPREDNISolone sodium succinate Injectable 40 milliGRAM(s) IV Push every 24 hours  metoprolol succinate ER 25 milliGRAM(s) Oral daily  Nephro-lenore 1 Tablet(s) Oral daily  pantoprazole  Injectable 40 milliGRAM(s) IV Push two times a day  tamsulosin 0.4 milliGRAM(s) Oral at bedtime  tiotropium 2.5 MICROgram(s) Inhaler 2 Puff(s) Inhalation daily    MEDICATIONS  (PRN):  albuterol    90 MICROgram(s) HFA Inhaler 2 Puff(s) Inhalation every 6 hours PRN Shortness of Breath and/or Wheezing  bisacodyl 5 milliGRAM(s) Oral every 12 hours PRN Constipation  melatonin 3 milliGRAM(s) Oral at bedtime PRN Insomnia  midodrine. 10 milliGRAM(s) Oral <User Schedule> PRN Low BP prior to HD  ondansetron Injectable 4 milliGRAM(s) IV Push every 8 hours PRN Nausea and/or Vomiting  oxyCODONE    IR 10 milliGRAM(s) Oral every 6 hours PRN Severe Pain (7 - 10)      I&O's Summary    30 Nov 2023 07:01  -  01 Dec 2023 07:00  --------------------------------------------------------  IN: 240 mL / OUT: 0 mL / NET: 240 mL        T(C): 36.6 (12-01-23 @ 11:03), Max: 36.8 (11-30-23 @ 20:45)  HR: 68 (12-01-23 @ 11:03) (60 - 68)  BP: 139/65 (12-01-23 @ 11:03) (124/67 - 146/69)  RR: 18 (12-01-23 @ 11:03) (18 - 18)  SpO2: 94% (12-01-23 @ 11:03) (93% - 94%)    CONSTITUTIONAL: sitting in chair, comfortable appearing elderly gentleman   RESPIRATORY: CTABL; no wheezing  CARDIOVASCULAR: RRR, s1, s2  ABDOMEN: Nontender to palpation, normoactive bowel sounds  MUSCULOSKELETAL: no clubbing or cyanosis of digits; no joint swelling or tenderness to palpation  PSYCH: A+O to person, place, and time; affect appropriate  NEUROLOGY: CN 2-12 are intact and symmetric; no gross sensory deficits   SKIN: No rashes; no palpable lesions    LABS:                        11.9   7.68  )-----------( 351      ( 30 Nov 2023 11:33 )             35.8     11-30    137  |  91<L>  |  49<H>  ----------------------------<  131<H>  4.4   |  26  |  7.26<H>    Ca    8.7      30 Nov 2023 11:33  Phos  7.0     11-30  Mg     2.4     11-30            Urinalysis Basic - ( 30 Nov 2023 11:33 )    Color: x / Appearance: x / SG: x / pH: x  Gluc: 131 mg/dL / Ketone: x  / Bili: x / Urobili: x   Blood: x / Protein: x / Nitrite: x   Leuk Esterase: x / RBC: x / WBC x   Sq Epi: x / Non Sq Epi: x / Bacteria: x        Culture - Sputum (collected 29 Nov 2023 13:06)  Source: .Sputum Sputum  Gram Stain (29 Nov 2023 23:28):    Few polymorphonuclear leukocytes per low power field    Moderate Squamous epithelial cells per low power field    Few Gram positive cocci in pairs, chains and clusters per oil power field    Few Gram Positive Rods per oil power field    Few Gram Negative Rods per oil power field    Results consistent with oropharyngeal contamination  Preliminary Report (30 Nov 2023 14:28):    Normal Respiratory Yu present      SARS-CoV-2: NotDetec (26 Nov 2023 12:30)          RADIOLOGY & ADDITIONAL TESTS:  New Results Reviewed Today:   New Imaging Personally Reviewed Today:  New Electrocardiogram Personally Reviewed Today:  Prior or Outpatient Records Reviewed Today:    COMMUNICATION:  Care Discussed with Consultants/Other Providers and Details of Discussion: Discussed with ACP  Discussions with Patient/Family:  PCP Communication:

## 2023-12-01 NOTE — DISCHARGE NOTE NURSING/CASE MANAGEMENT/SOCIAL WORK - PATIENT PORTAL LINK FT
You can access the FollowMyHealth Patient Portal offered by [facility] by registering at the following website: [email or web address]. By joining [employer]’s FollowMyHealth portal, you will also be able to view your health information using other applications (apps) compatible with our system.

## 2023-12-01 NOTE — PROGRESS NOTE ADULT - PROBLEM SELECTOR PLAN 9
DVT: IMRPOVE 1  Diet: renal  Dispo: pending PT - pt refused PT evaluation    # GOC   Health care proxy - Mira Kimberlee [phone], alternate Cathy Power [phone]

## 2023-12-01 NOTE — PHARMACOTHERAPY INTERVENTION NOTE - COMMENTS
Counseled patient on discharge medication doses, indications, and possible side effects. Provided and reviewed medication cards. Answered all of the patient's questions to the best of my ability. Patient exhibited understanding of discharge medication regimen.    Counseled Medications:  aspirin enteric coated 81 milliGRAM(s) Oral daily  atorvastatin 20 milliGRAM(s) Oral at bedtime  calcium acetate 1334 milliGRAM(s) Oral three times a day with meals  clopidogrel Tablet 75 milliGRAM(s) Oral daily  metoprolol succinate ER 25 milliGRAM(s) Oral daily  tamsulosin 0.4 milliGRAM(s) Oral at bedtime    Deonte Quintana, PharmD  PGY1 Pharmacy Resident   Available on [facility]  SpectraLink: [phone]     Counseled patient and patient's daughter (Popi) on discharge medication doses, indications, and possible side effects. Provided and reviewed medication cards. Answered all of the patient's questions to the best of my ability. Patient exhibited understanding of discharge medication regimen.    Counseled Medications:  aspirin enteric coated 81 milliGRAM(s) Oral daily  atorvastatin 20 milliGRAM(s) Oral at bedtime  calcium acetate 1334 milliGRAM(s) Oral three times a day with meals  clopidogrel Tablet 75 milliGRAM(s) Oral daily  metoprolol succinate ER 25 milliGRAM(s) Oral daily  tamsulosin 0.4 milliGRAM(s) Oral at bedtime    Deonte Quintana, PharmD  PGY1 Pharmacy Resident   Available on [facility]  SpectraLink: [phone]

## 2023-12-01 NOTE — PROGRESS NOTE ADULT - PROBLEM SELECTOR PLAN 6
On HD M,W,F. AVF fistula on the LUE  Nephrologist: Dr. Sandra Monte  - Nephrology consulted  - c/w midodrine 10mg MWF for dialysis as needed.  - phos above goal, f/u nephro recs

## 2023-12-01 NOTE — PROGRESS NOTE ADULT - ASSESSMENT
87 year old male with PMHx of HTN, HLD, CAD s/p diagnostic [facility] 06/2023 showing severe mLAD disease with severe OM not amendable to stenting, prior LAD + proximal OM stent, PCI/SKYLAR of mLAD x1 (Dr. Wesley) - 08/2023, PVD, ESRD - on HD MWF who initially presented for ambulatory pericardial drain. Procedure was cancelled due to hyperkalemia to 6.4, and the patient was admitted for further care.  He is s/p nonurgent dialysis on 11/22, now potassium is normalized. Pt is now s/p IR guided pericardial drain placement, on 11/24, drain removed 11/28.

## 2023-12-01 NOTE — PROGRESS NOTE ADULT - PROBLEM SELECTOR PROBLEM 2
ESRD on dialysis
Acute hypoxemic respiratory failure
Hyperkalemia
ESRD on dialysis
Acute hypoxemic respiratory failure
Nausea and vomiting
Acute hypoxemic respiratory failure
Hyperkalemia
Nausea and vomiting
Acute hypoxemic respiratory failure

## 2023-12-01 NOTE — PROGRESS NOTE ADULT - PROVIDER SPECIALTY LIST ADULT
Cardiology
Hospitalist
Intervent Radiology
Intervent Radiology
Nephrology-Cardiorenal
Hospitalist
Internal Medicine
Nephrology-Cardiorenal
Intervent Radiology
Internal Medicine
Nephrology-Cardiorenal
Hospitalist
Hospitalist
Internal Medicine
Hospitalist
Internal Medicine
Internal Medicine

## 2023-12-01 NOTE — PROGRESS NOTE ADULT - REASON FOR ADMISSION
Pericardial drain

## 2023-12-02 RX ORDER — LEVOFLOXACIN 5 MG/ML
1 INJECTION, SOLUTION INTRAVENOUS
Qty: 1 | Refills: 0
Start: 2023-12-02 | End: 2023-12-02

## 2023-12-04 ENCOUNTER — TRANSCRIPTION ENCOUNTER (OUTPATIENT)
Age: 88
End: 2023-12-04

## 2023-12-06 ENCOUNTER — NON-APPOINTMENT (OUTPATIENT)
Age: 88
End: 2023-12-06

## 2023-12-06 ENCOUNTER — APPOINTMENT (OUTPATIENT)
Dept: CARDIOLOGY | Facility: CLINIC | Age: 88
End: 2023-12-06
Payer: MEDICARE

## 2023-12-06 VITALS
BODY MASS INDEX: 27.64 KG/M2 | DIASTOLIC BLOOD PRESSURE: 66 MMHG | OXYGEN SATURATION: 96 % | HEIGHT: 66 IN | SYSTOLIC BLOOD PRESSURE: 195 MMHG | WEIGHT: 172 LBS | RESPIRATION RATE: 12 BRPM | HEART RATE: 76 BPM

## 2023-12-06 PROCEDURE — 99214 OFFICE O/P EST MOD 30 MIN: CPT

## 2023-12-06 PROCEDURE — 93000 ELECTROCARDIOGRAM COMPLETE: CPT

## 2023-12-06 RX ORDER — FLUDROCORTISONE ACETATE 0.1 MG/1
0.1 TABLET ORAL
Qty: 30 | Refills: 1 | Status: ACTIVE | COMMUNITY
Start: 2021-09-13 | End: 1900-01-01

## 2023-12-07 ENCOUNTER — APPOINTMENT (OUTPATIENT)
Dept: CARE COORDINATION | Facility: HOME HEALTH | Age: 88
End: 2023-12-07
Payer: MEDICARE

## 2023-12-07 VITALS
HEART RATE: 74 BPM | HEIGHT: 66 IN | SYSTOLIC BLOOD PRESSURE: 146 MMHG | TEMPERATURE: 98.1 F | OXYGEN SATURATION: 99 % | DIASTOLIC BLOOD PRESSURE: 62 MMHG | WEIGHT: 171.96 LBS | BODY MASS INDEX: 27.64 KG/M2 | RESPIRATION RATE: 16 BRPM

## 2023-12-07 DIAGNOSIS — M79.661 PAIN IN RIGHT LOWER LEG: ICD-10-CM

## 2023-12-07 PROCEDURE — 99349 HOME/RES VST EST MOD MDM 40: CPT

## 2023-12-08 ENCOUNTER — TRANSCRIPTION ENCOUNTER (OUTPATIENT)
Age: 88
End: 2023-12-08

## 2023-12-10 PROBLEM — M79.661 RIGHT CALF PAIN: Status: ACTIVE | Noted: 2023-12-10

## 2023-12-10 RX ORDER — SODIUM ZIRCONIUM CYCLOSILICATE 10 G/10G
10 POWDER, FOR SUSPENSION ORAL DAILY
Refills: 0 | Status: ACTIVE | COMMUNITY
Start: 2023-12-10

## 2023-12-10 RX ORDER — IPRATROPIUM BROMIDE AND ALBUTEROL SULFATE 2.5; .5 MG/3ML; MG/3ML
0.5-2.5 (3) SOLUTION RESPIRATORY (INHALATION)
Qty: 60 | Refills: 3 | Status: DISCONTINUED | COMMUNITY
Start: 2018-04-16 | End: 2023-12-10

## 2023-12-10 RX ORDER — SODIUM ZIRCONIUM CYCLOSILICATE 10 G/10G
10 POWDER, FOR SUSPENSION ORAL
Qty: 30 | Refills: 0 | Status: DISCONTINUED | COMMUNITY
Start: 2021-09-17 | End: 2023-12-10

## 2023-12-10 RX ORDER — MECLIZINE HYDROCHLORIDE 25 MG/1
25 TABLET ORAL 3 TIMES DAILY
Qty: 90 | Refills: 0 | Status: DISCONTINUED | COMMUNITY
Start: 2017-03-07 | End: 2023-12-10

## 2023-12-10 RX ORDER — CALCIUM ACETATE 667 MG/1
667 TABLET ORAL 3 TIMES DAILY
Refills: 0 | Status: ACTIVE | COMMUNITY
Start: 2023-12-10

## 2023-12-10 RX ORDER — ONDANSETRON 4 MG/1
4 TABLET ORAL DAILY
Qty: 30 | Refills: 0 | Status: DISCONTINUED | COMMUNITY
Start: 2021-03-10 | End: 2023-12-10

## 2023-12-10 RX ORDER — TRIAMCINOLONE ACETONIDE 1 MG/G
0.1 CREAM TOPICAL
Qty: 454 | Refills: 0 | Status: DISCONTINUED | COMMUNITY
Start: 2021-12-01 | End: 2023-12-10

## 2023-12-10 RX ORDER — RISEDRONATE SODIUM 35 MG/1
35 TABLET, FILM COATED ORAL
Qty: 12 | Refills: 3 | Status: DISCONTINUED | COMMUNITY
Start: 2017-01-03 | End: 2023-12-10

## 2023-12-13 ENCOUNTER — TRANSCRIPTION ENCOUNTER (OUTPATIENT)
Age: 88
End: 2023-12-13

## 2023-12-13 PROCEDURE — 89051 BODY FLUID CELL COUNT: CPT

## 2023-12-13 PROCEDURE — 87205 SMEAR GRAM STAIN: CPT

## 2023-12-13 PROCEDURE — 82947 ASSAY GLUCOSE BLOOD QUANT: CPT

## 2023-12-13 PROCEDURE — 93306 TTE W/DOPPLER COMPLETE: CPT

## 2023-12-13 PROCEDURE — 84157 ASSAY OF PROTEIN OTHER: CPT

## 2023-12-13 PROCEDURE — 86901 BLOOD TYPING SEROLOGIC RH(D): CPT

## 2023-12-13 PROCEDURE — 82435 ASSAY OF BLOOD CHLORIDE: CPT

## 2023-12-13 PROCEDURE — 87641 MR-STAPH DNA AMP PROBE: CPT

## 2023-12-13 PROCEDURE — 84100 ASSAY OF PHOSPHORUS: CPT

## 2023-12-13 PROCEDURE — 88184 FLOWCYTOMETRY/ TC 1 MARKER: CPT

## 2023-12-13 PROCEDURE — 88185 FLOWCYTOMETRY/TC ADD-ON: CPT

## 2023-12-13 PROCEDURE — 83550 IRON BINDING TEST: CPT

## 2023-12-13 PROCEDURE — 83540 ASSAY OF IRON: CPT

## 2023-12-13 PROCEDURE — 87206 SMEAR FLUORESCENT/ACID STAI: CPT

## 2023-12-13 PROCEDURE — 83970 ASSAY OF PARATHORMONE: CPT

## 2023-12-13 PROCEDURE — 82042 OTHER SOURCE ALBUMIN QUAN EA: CPT

## 2023-12-13 PROCEDURE — 87640 STAPH A DNA AMP PROBE: CPT

## 2023-12-13 PROCEDURE — 83615 LACTATE (LD) (LDH) ENZYME: CPT

## 2023-12-13 PROCEDURE — 36415 COLL VENOUS BLD VENIPUNCTURE: CPT

## 2023-12-13 PROCEDURE — 87340 HEPATITIS B SURFACE AG IA: CPT

## 2023-12-13 PROCEDURE — 82306 VITAMIN D 25 HYDROXY: CPT

## 2023-12-13 PROCEDURE — 86850 RBC ANTIBODY SCREEN: CPT

## 2023-12-13 PROCEDURE — 33019 PERQ PRCRD DRG INSJ CATH CT: CPT

## 2023-12-13 PROCEDURE — 82962 GLUCOSE BLOOD TEST: CPT

## 2023-12-13 PROCEDURE — 93321 DOPPLER ECHO F-UP/LMTD STD: CPT

## 2023-12-13 PROCEDURE — 0225U NFCT DS DNA&RNA 21 SARSCOV2: CPT

## 2023-12-13 PROCEDURE — 82728 ASSAY OF FERRITIN: CPT

## 2023-12-13 PROCEDURE — 84295 ASSAY OF SERUM SODIUM: CPT

## 2023-12-13 PROCEDURE — 84484 ASSAY OF TROPONIN QUANT: CPT

## 2023-12-13 PROCEDURE — 82310 ASSAY OF CALCIUM: CPT

## 2023-12-13 PROCEDURE — 87015 SPECIMEN INFECT AGNT CONCNTJ: CPT

## 2023-12-13 PROCEDURE — 84132 ASSAY OF SERUM POTASSIUM: CPT

## 2023-12-13 PROCEDURE — 82330 ASSAY OF CALCIUM: CPT

## 2023-12-13 PROCEDURE — 85018 HEMOGLOBIN: CPT

## 2023-12-13 PROCEDURE — 87070 CULTURE OTHR SPECIMN AEROBIC: CPT

## 2023-12-13 PROCEDURE — 85014 HEMATOCRIT: CPT

## 2023-12-13 PROCEDURE — 71045 X-RAY EXAM CHEST 1 VIEW: CPT

## 2023-12-13 PROCEDURE — 82550 ASSAY OF CK (CPK): CPT

## 2023-12-13 PROCEDURE — 80048 BASIC METABOLIC PNL TOTAL CA: CPT

## 2023-12-13 PROCEDURE — 82945 GLUCOSE OTHER FLUID: CPT

## 2023-12-13 PROCEDURE — 88237 TISSUE CULTURE BONE MARROW: CPT

## 2023-12-13 PROCEDURE — 83605 ASSAY OF LACTIC ACID: CPT

## 2023-12-13 PROCEDURE — 71250 CT THORAX DX C-: CPT

## 2023-12-13 PROCEDURE — C1729: CPT

## 2023-12-13 PROCEDURE — 93308 TTE F-UP OR LMTD: CPT

## 2023-12-13 PROCEDURE — 87116 MYCOBACTERIA CULTURE: CPT

## 2023-12-13 PROCEDURE — 87075 CULTR BACTERIA EXCEPT BLOOD: CPT

## 2023-12-13 PROCEDURE — 87102 FUNGUS ISOLATION CULTURE: CPT

## 2023-12-13 PROCEDURE — 99261: CPT

## 2023-12-13 PROCEDURE — 82553 CREATINE MB FRACTION: CPT

## 2023-12-13 PROCEDURE — 85610 PROTHROMBIN TIME: CPT

## 2023-12-13 PROCEDURE — 93005 ELECTROCARDIOGRAM TRACING: CPT

## 2023-12-13 PROCEDURE — 97161 PT EVAL LOW COMPLEX 20 MIN: CPT

## 2023-12-13 PROCEDURE — 85027 COMPLETE CBC AUTOMATED: CPT

## 2023-12-13 PROCEDURE — C1894: CPT

## 2023-12-13 PROCEDURE — 94640 AIRWAY INHALATION TREATMENT: CPT

## 2023-12-13 PROCEDURE — 85730 THROMBOPLASTIN TIME PARTIAL: CPT

## 2023-12-13 PROCEDURE — 74176 CT ABD & PELVIS W/O CONTRAST: CPT

## 2023-12-13 PROCEDURE — 83735 ASSAY OF MAGNESIUM: CPT

## 2023-12-13 PROCEDURE — 86900 BLOOD TYPING SEROLOGIC ABO: CPT

## 2023-12-13 PROCEDURE — 82803 BLOOD GASES ANY COMBINATION: CPT

## 2023-12-13 PROCEDURE — C1769: CPT

## 2023-12-14 ENCOUNTER — APPOINTMENT (OUTPATIENT)
Dept: CARDIOLOGY | Facility: CLINIC | Age: 88
End: 2023-12-14

## 2023-12-27 LAB
CULTURE RESULTS: SIGNIFICANT CHANGE UP
CULTURE RESULTS: SIGNIFICANT CHANGE UP
SPECIMEN SOURCE: SIGNIFICANT CHANGE UP
SPECIMEN SOURCE: SIGNIFICANT CHANGE UP

## 2024-01-28 NOTE — PHYSICAL THERAPY INITIAL EVALUATION ADULT - PATIENT/FAMILY/SIGNIFICANT OTHER GOALS STATEMENT, PT EVAL
go home given hx and pe cc for intracranial bleed pt up to date w/ tdap and will staple head w/ return precautions given hx and pe cc for intracranial bleed pt up to date w/ tdap and will staple head w/ return precautions    see attending attestation authored by me, Chemo Ward MD, for further MDM details.

## 2024-03-05 NOTE — PHYSICAL THERAPY INITIAL EVALUATION ADULT - GAIT TRAINING, PT EVAL
4. GOAL: In 3 weeks, pt will be able to ambulate 150 ft w/ least restrictive AD independently. CONSTITUTIONAL: NAD  SKIN: Warm, dry  HEAD: NCAT  EYES: Clear conjunctiva   ENT: MMM  NECK: Supple  CARD: RRR, S1, S2; no M/R/G  RESP: increased WOB only when speaking, satting 98% on 3L NC, desats to 88% while speaking, crackles in bilateral bases  ABD: Soft, nontender. No rebound, rigidity, or guarding  EXT: pitting edema bilateral lower extremities   NEURO: Grossly intact. Awake, alert, moving all extremities, no facial asymmetry.

## 2024-04-04 ENCOUNTER — NON-APPOINTMENT (OUTPATIENT)
Age: 89
End: 2024-04-04

## 2024-04-04 ENCOUNTER — APPOINTMENT (OUTPATIENT)
Dept: CARDIOLOGY | Facility: CLINIC | Age: 89
End: 2024-04-04
Payer: MEDICARE

## 2024-04-04 VITALS
OXYGEN SATURATION: 99 % | SYSTOLIC BLOOD PRESSURE: 130 MMHG | WEIGHT: 171 LBS | HEART RATE: 75 BPM | DIASTOLIC BLOOD PRESSURE: 80 MMHG | HEIGHT: 66 IN | RESPIRATION RATE: 12 BRPM | BODY MASS INDEX: 27.48 KG/M2

## 2024-04-04 DIAGNOSIS — I31.39 OTHER PERICARDIAL EFFUSION (NONINFLAMMATORY): ICD-10-CM

## 2024-04-04 DIAGNOSIS — I48.91 UNSPECIFIED ATRIAL FIBRILLATION: ICD-10-CM

## 2024-04-04 PROCEDURE — G2211 COMPLEX E/M VISIT ADD ON: CPT

## 2024-04-04 PROCEDURE — 99214 OFFICE O/P EST MOD 30 MIN: CPT

## 2024-04-04 PROCEDURE — 93000 ELECTROCARDIOGRAM COMPLETE: CPT

## 2024-04-04 RX ORDER — DEXAMETHASONE 6 MG/1
6 TABLET ORAL DAILY
Qty: 10 | Refills: 0 | Status: DISCONTINUED | COMMUNITY
Start: 2022-01-03 | End: 2024-04-04

## 2024-04-04 RX ORDER — FLUTICASONE PROPIONATE AND SALMETEROL 250; 50 UG/1; UG/1
250-50 POWDER RESPIRATORY (INHALATION)
Qty: 1 | Refills: 3 | Status: DISCONTINUED | COMMUNITY
Start: 2022-01-03 | End: 2024-04-04

## 2024-04-04 RX ORDER — ASPIRIN 81 MG/1
81 TABLET, COATED ORAL DAILY
Qty: 90 | Refills: 5 | Status: DISCONTINUED | COMMUNITY
Start: 2021-09-10 | End: 2024-04-04

## 2024-04-04 NOTE — DISCUSSION/SUMMARY
[FreeTextEntry1] : The patient is an 88-year-old gentleman ex-smoker, HTN, HLD, hypothyroidism, PVD, CAD, HFrEF, anemia, pericardial effusion, ESRDs/p LAD stent s/p CT drainage of pericardial effusion #1 CAD- prior LAD and Cx stents patent, s/p mLAD stent, c/w DAPT #2 Htn/hypotension- remain off amlodipine, c/w midodrine 20mg and restart fluorinef 0.1mg on dialysis days  #3 PVC- c/w toprol 25mg for PVC #4 Pericardial effusion- s/p CT drainage for expanding large effusion #5 HLD- c/w atorvastatin 20mg #6 Hypothyroid- therapeutic #7 ESRD- on dialysis Mon, Wed, Fri, f/u Dr. Beltran from dialysis center [EKG obtained to assist in diagnosis and management of assessed problem(s)] : EKG obtained to assist in diagnosis and management of assessed problem(s)

## 2024-04-04 NOTE — HISTORY OF PRESENT ILLNESS
[FreeTextEntry1] : Kory is more SOB on exertion over the last two weeks. Hard for him to sit for three hours for dialysis. Frustrated. Here with wife and dtr on phone.

## 2024-04-09 ENCOUNTER — APPOINTMENT (OUTPATIENT)
Dept: PULMONOLOGY | Facility: CLINIC | Age: 89
End: 2024-04-09
Payer: MEDICARE

## 2024-04-09 VITALS
SYSTOLIC BLOOD PRESSURE: 195 MMHG | DIASTOLIC BLOOD PRESSURE: 81 MMHG | TEMPERATURE: 98.1 F | RESPIRATION RATE: 14 BRPM | OXYGEN SATURATION: 94 % | HEART RATE: 72 BPM

## 2024-04-09 DIAGNOSIS — R06.09 OTHER FORMS OF DYSPNEA: ICD-10-CM

## 2024-04-09 DIAGNOSIS — N18.6 END STAGE RENAL DISEASE: ICD-10-CM

## 2024-04-09 DIAGNOSIS — I25.10 ATHEROSCLEROTIC HEART DISEASE OF NATIVE CORONARY ARTERY W/OUT ANGINA PECTORIS: ICD-10-CM

## 2024-04-09 PROCEDURE — 99214 OFFICE O/P EST MOD 30 MIN: CPT

## 2024-04-09 RX ORDER — PREDNISONE 10 MG/1
10 TABLET ORAL
Qty: 7 | Refills: 0 | Status: ACTIVE | COMMUNITY
Start: 2024-04-09 | End: 1900-01-01

## 2024-04-12 ENCOUNTER — RX RENEWAL (OUTPATIENT)
Age: 89
End: 2024-04-12

## 2024-04-12 RX ORDER — FLUTICASONE FUROATE, UMECLIDINIUM BROMIDE AND VILANTEROL TRIFENATATE 100; 62.5; 25 UG/1; UG/1; UG/1
100-62.5-25 POWDER RESPIRATORY (INHALATION) DAILY
Qty: 1 | Refills: 3 | Status: ACTIVE | COMMUNITY
Start: 2022-11-10 | End: 1900-01-01

## 2024-04-23 ENCOUNTER — APPOINTMENT (OUTPATIENT)
Dept: CARDIOLOGY | Facility: CLINIC | Age: 89
End: 2024-04-23
Payer: MEDICARE

## 2024-04-23 PROCEDURE — 93308 TTE F-UP OR LMTD: CPT

## 2024-05-14 ENCOUNTER — APPOINTMENT (OUTPATIENT)
Dept: PULMONOLOGY | Facility: CLINIC | Age: 89
End: 2024-05-14
Payer: MEDICARE

## 2024-05-14 VITALS
HEART RATE: 71 BPM | SYSTOLIC BLOOD PRESSURE: 184 MMHG | DIASTOLIC BLOOD PRESSURE: 82 MMHG | RESPIRATION RATE: 14 BRPM | OXYGEN SATURATION: 94 % | TEMPERATURE: 98 F

## 2024-05-14 DIAGNOSIS — J44.0 CHRONIC OBSTRUCTIVE PULMONARY DISEASE W/ (ACUTE) LWR RESPIRATORY INFECTION: ICD-10-CM

## 2024-05-14 DIAGNOSIS — I10 ESSENTIAL (PRIMARY) HYPERTENSION: ICD-10-CM

## 2024-05-14 DIAGNOSIS — Z86.39 PERSONAL HISTORY OF OTHER ENDOCRINE, NUTRITIONAL AND METABOLIC DISEASE: ICD-10-CM

## 2024-05-14 DIAGNOSIS — Z87.891 PERSONAL HISTORY OF NICOTINE DEPENDENCE: ICD-10-CM

## 2024-05-14 DIAGNOSIS — I50.9 HEART FAILURE, UNSPECIFIED: ICD-10-CM

## 2024-05-14 DIAGNOSIS — E03.9 HYPOTHYROIDISM, UNSPECIFIED: ICD-10-CM

## 2024-05-14 DIAGNOSIS — Z86.79 PERSONAL HISTORY OF OTHER DISEASES OF THE CIRCULATORY SYSTEM: ICD-10-CM

## 2024-05-14 DIAGNOSIS — J20.9 CHRONIC OBSTRUCTIVE PULMONARY DISEASE W/ (ACUTE) LWR RESPIRATORY INFECTION: ICD-10-CM

## 2024-05-14 PROCEDURE — 99214 OFFICE O/P EST MOD 30 MIN: CPT

## 2024-05-14 RX ORDER — DICLOFENAC SODIUM 1% 10 MG/G
1 GEL TOPICAL
Qty: 100 | Refills: 3 | Status: ACTIVE | COMMUNITY
Start: 2024-05-14 | End: 1900-01-01

## 2024-05-14 RX ORDER — AMOXICILLIN AND CLAVULANATE POTASSIUM 500; 125 MG/1; MG/1
500-125 TABLET, FILM COATED ORAL
Qty: 10 | Refills: 0 | Status: ACTIVE | COMMUNITY
Start: 2024-05-14 | End: 1900-01-01

## 2024-08-05 ENCOUNTER — RX RENEWAL (OUTPATIENT)
Age: 89
End: 2024-08-05

## 2024-09-23 NOTE — ASU PATIENT PROFILE, ADULT - URINARY CATHETER
Addended by: AMOS DISLA on: 9/23/2024 02:49 PM     Modules accepted: Orders    
Addended by: FERMIN VILLANUEVA on: 9/23/2024 02:54 PM     Modules accepted: Orders    
no

## 2024-09-27 NOTE — HISTORY OF PRESENT ILLNESS
Patient called [facility] triage line and left a voicemail. Patient reports lingering symptoms. Completed 14 day course of Augmentin. See correspondence 9/4/24 for background.     RN attempted to reach patient. Left voicemail with name and callback number.    EMILY Renee   [FreeTextEntry1] : Kory continues to complain of fatigue. Does ok for 2 h ours of dialysis then BP drops. No lightheadedness or dizziness. Cancelled his pericardiocentesis. Spoke to nephrologist.

## 2025-01-28 ENCOUNTER — APPOINTMENT (OUTPATIENT)
Age: 89
End: 2025-01-28

## 2025-01-28 VITALS
SYSTOLIC BLOOD PRESSURE: 143 MMHG | OXYGEN SATURATION: 99 % | HEART RATE: 73 BPM | TEMPERATURE: 96.3 F | HEIGHT: 66 IN | WEIGHT: 162 LBS | RESPIRATION RATE: 15 BRPM | DIASTOLIC BLOOD PRESSURE: 65 MMHG | BODY MASS INDEX: 26.03 KG/M2

## 2025-01-28 PROCEDURE — G2211 COMPLEX E/M VISIT ADD ON: CPT

## 2025-01-28 PROCEDURE — 99204 OFFICE O/P NEW MOD 45 MIN: CPT

## 2025-01-30 ENCOUNTER — APPOINTMENT (OUTPATIENT)
Age: 89
End: 2025-01-30

## 2025-01-30 VITALS
TEMPERATURE: 98 F | SYSTOLIC BLOOD PRESSURE: 126 MMHG | RESPIRATION RATE: 14 BRPM | DIASTOLIC BLOOD PRESSURE: 58 MMHG | OXYGEN SATURATION: 98 % | HEART RATE: 77 BPM

## 2025-01-30 PROCEDURE — 99213 OFFICE O/P EST LOW 20 MIN: CPT

## 2025-01-30 RX ORDER — AZITHROMYCIN 250 MG/1
250 TABLET, FILM COATED ORAL
Qty: 1 | Refills: 0 | Status: ACTIVE | COMMUNITY
Start: 2025-01-30 | End: 1900-01-01

## 2025-02-18 RX ORDER — POLYETHYLENE GLYCOL 3350 17 G/17G
17 POWDER, FOR SOLUTION ORAL DAILY
Qty: 90 | Refills: 1 | Status: ACTIVE | COMMUNITY
Start: 2025-02-18 | End: 1900-01-01

## 2025-02-18 RX ORDER — VIT B COMP NO.3/FOLIC/C/BIOTIN 1 MG-60 MG
1 TABLET ORAL DAILY
Qty: 90 | Refills: 1 | Status: ACTIVE | COMMUNITY
Start: 2025-02-18 | End: 1900-01-01

## 2025-02-18 RX ORDER — ERGOCALCIFEROL 1.25 MG/1
1.25 MG CAPSULE, LIQUID FILLED ORAL
Qty: 12 | Refills: 1 | Status: ACTIVE | COMMUNITY
Start: 2025-02-18 | End: 1900-01-01

## 2025-02-18 RX ORDER — PANTOPRAZOLE 40 MG/1
40 TABLET, DELAYED RELEASE ORAL
Qty: 90 | Refills: 1 | Status: ACTIVE | COMMUNITY
Start: 2025-02-18 | End: 1900-01-01

## 2025-02-18 RX ORDER — GLUCOSA SU 2KCL/CHONDROITIN SU 500-400 MG
500 CAPSULE ORAL DAILY
Qty: 90 | Refills: 1 | Status: ACTIVE | COMMUNITY
Start: 2025-02-18 | End: 1900-01-01

## 2025-02-18 RX ORDER — APIXABAN 2.5 MG/1
2.5 TABLET, FILM COATED ORAL
Qty: 180 | Refills: 1 | Status: ACTIVE | COMMUNITY
Start: 2025-02-18 | End: 1900-01-01

## 2025-02-23 PROBLEM — R34 ANURIA: Status: ACTIVE | Noted: 2025-02-23

## 2025-03-27 ENCOUNTER — APPOINTMENT (OUTPATIENT)
Dept: CARDIOLOGY | Facility: CLINIC | Age: 89
End: 2025-03-27

## 2025-05-20 ENCOUNTER — APPOINTMENT (OUTPATIENT)
Age: 89
End: 2025-05-20
Payer: MEDICARE

## 2025-05-20 VITALS
TEMPERATURE: 97.9 F | HEART RATE: 60 BPM | RESPIRATION RATE: 14 BRPM | SYSTOLIC BLOOD PRESSURE: 134 MMHG | OXYGEN SATURATION: 99 % | DIASTOLIC BLOOD PRESSURE: 62 MMHG

## 2025-05-20 DIAGNOSIS — J20.9 ACUTE BRONCHITIS, UNSPECIFIED: ICD-10-CM

## 2025-05-20 DIAGNOSIS — R06.02 SHORTNESS OF BREATH: ICD-10-CM

## 2025-05-20 DIAGNOSIS — J44.0 CHRONIC OBSTRUCTIVE PULMONARY DISEASE W/ (ACUTE) LWR RESPIRATORY INFECTION: ICD-10-CM

## 2025-05-20 DIAGNOSIS — J20.9 CHRONIC OBSTRUCTIVE PULMONARY DISEASE W/ (ACUTE) LWR RESPIRATORY INFECTION: ICD-10-CM

## 2025-05-20 PROCEDURE — 99213 OFFICE O/P EST LOW 20 MIN: CPT

## 2025-05-20 RX ORDER — AZITHROMYCIN 250 MG/1
250 TABLET, FILM COATED ORAL
Qty: 1 | Refills: 0 | Status: ACTIVE | COMMUNITY
Start: 2025-05-20 | End: 1900-01-01

## 2025-06-12 ENCOUNTER — APPOINTMENT (OUTPATIENT)
Age: 89
End: 2025-06-12
Payer: MEDICARE

## 2025-06-12 VITALS
RESPIRATION RATE: 14 BRPM | HEIGHT: 65 IN | HEART RATE: 73 BPM | OXYGEN SATURATION: 94 % | TEMPERATURE: 97.3 F | SYSTOLIC BLOOD PRESSURE: 121 MMHG | WEIGHT: 169.75 LBS | BODY MASS INDEX: 28.28 KG/M2 | DIASTOLIC BLOOD PRESSURE: 64 MMHG

## 2025-06-12 PROCEDURE — 99213 OFFICE O/P EST LOW 20 MIN: CPT

## 2025-06-16 PROBLEM — M79.641 PAIN IN BOTH HANDS: Status: ACTIVE | Noted: 2025-06-16

## 2025-07-03 ENCOUNTER — NON-APPOINTMENT (OUTPATIENT)
Age: 89
End: 2025-07-03

## 2025-07-03 ENCOUNTER — APPOINTMENT (OUTPATIENT)
Dept: CARDIOLOGY | Facility: CLINIC | Age: 89
End: 2025-07-03
Payer: MEDICARE

## 2025-07-03 VITALS
SYSTOLIC BLOOD PRESSURE: 161 MMHG | HEART RATE: 80 BPM | RESPIRATION RATE: 18 BRPM | DIASTOLIC BLOOD PRESSURE: 70 MMHG | BODY MASS INDEX: 28.99 KG/M2 | OXYGEN SATURATION: 96 % | WEIGHT: 174 LBS | HEIGHT: 65 IN

## 2025-07-03 PROCEDURE — 99214 OFFICE O/P EST MOD 30 MIN: CPT

## 2025-07-03 PROCEDURE — G2211 COMPLEX E/M VISIT ADD ON: CPT

## 2025-07-03 PROCEDURE — 93000 ELECTROCARDIOGRAM COMPLETE: CPT

## 2025-07-15 ENCOUNTER — APPOINTMENT (OUTPATIENT)
Age: 89
End: 2025-07-15

## 2025-08-26 ENCOUNTER — RX RENEWAL (OUTPATIENT)
Age: 89
End: 2025-08-26

## 2025-08-26 RX ORDER — DOCUSATE SODIUM 50 MG AND SENNOSIDES 8.6 MG 8.6; 5 MG/1; MG/1
8.6-5 TABLET, FILM COATED ORAL
Qty: 180 | Refills: 4 | Status: ACTIVE | COMMUNITY
Start: 2025-08-26 | End: 1900-01-01

## 2025-08-26 RX ORDER — POLYETHYLENE GLYCOL 3350 17 G/17G
17 POWDER, FOR SOLUTION ORAL
Qty: 90 | Refills: 2 | Status: ACTIVE | COMMUNITY
Start: 2025-08-26 | End: 1900-01-01

## 2025-09-09 ENCOUNTER — APPOINTMENT (OUTPATIENT)
Age: 89
End: 2025-09-09